# Patient Record
Sex: FEMALE | HISPANIC OR LATINO | Employment: OTHER | URBAN - METROPOLITAN AREA
[De-identification: names, ages, dates, MRNs, and addresses within clinical notes are randomized per-mention and may not be internally consistent; named-entity substitution may affect disease eponyms.]

---

## 2017-01-05 ENCOUNTER — HOSPITAL ENCOUNTER (OUTPATIENT)
Dept: LAB | Facility: MEDICAL CENTER | Age: 72
DRG: 025 | End: 2017-01-05
Attending: INTERNAL MEDICINE
Payer: MEDICARE

## 2017-01-05 LAB
BASOPHILS # BLD AUTO: 0.03 K/UL (ref 0–0.12)
BASOPHILS NFR BLD AUTO: 0.5 % (ref 0–1.8)
EOSINOPHIL # BLD: 0.06 K/UL (ref 0–0.51)
EOSINOPHIL NFR BLD AUTO: 0.9 % (ref 0–6.9)
ERYTHROCYTE [DISTWIDTH] IN BLOOD BY AUTOMATED COUNT: 50.1 FL (ref 35.9–50)
HCT VFR BLD AUTO: 41.3 % (ref 37–47)
HGB BLD-MCNC: 13.2 G/DL (ref 12–16)
IMM GRANULOCYTES # BLD AUTO: 0.03 K/UL (ref 0–0.11)
IMM GRANULOCYTES NFR BLD AUTO: 0.5 % (ref 0–0.9)
LYMPHOCYTES # BLD: 1.37 K/UL (ref 1–4.8)
LYMPHOCYTES NFR BLD AUTO: 21 % (ref 22–41)
MCH RBC QN AUTO: 30.3 PG (ref 27–33)
MCHC RBC AUTO-ENTMCNC: 32 G/DL (ref 33.6–35)
MCV RBC AUTO: 94.9 FL (ref 81.4–97.8)
MONOCYTES # BLD: 0.39 K/UL (ref 0–0.85)
MONOCYTES NFR BLD AUTO: 6 % (ref 0–13.4)
NEUTROPHILS # BLD: 4.63 K/UL (ref 2–7.15)
NEUTROPHILS NFR BLD AUTO: 71.1 % (ref 44–72)
NRBC # BLD AUTO: 0 K/UL
NRBC BLD-RTO: 0 /100 WBC
PLATELET # BLD AUTO: 235 K/UL (ref 164–446)
PMV BLD AUTO: 10.7 FL (ref 9–12.9)
RBC # BLD AUTO: 4.35 M/UL (ref 4.2–5.4)
WBC # BLD AUTO: 6.5 K/UL (ref 4.8–10.8)

## 2017-01-06 ENCOUNTER — APPOINTMENT (OUTPATIENT)
Dept: RADIOLOGY | Facility: MEDICAL CENTER | Age: 72
End: 2017-01-06
Attending: EMERGENCY MEDICINE
Payer: MEDICARE

## 2017-01-06 ENCOUNTER — HOSPITAL ENCOUNTER (EMERGENCY)
Facility: MEDICAL CENTER | Age: 72
End: 2017-01-07
Attending: EMERGENCY MEDICINE
Payer: MEDICARE

## 2017-01-06 LAB
ALBUMIN SERPL BCP-MCNC: 3.6 G/DL (ref 3.2–4.9)
ALBUMIN/GLOB SERPL: 1.6 G/DL
ALP SERPL-CCNC: 56 U/L (ref 30–99)
ALT SERPL-CCNC: 13 U/L (ref 2–50)
ANION GAP SERPL CALC-SCNC: 14 MMOL/L (ref 0–11.9)
APTT PPP: 26.6 SEC (ref 24.7–36)
AST SERPL-CCNC: 10 U/L (ref 12–45)
BASOPHILS # BLD AUTO: 0.2 % (ref 0–1.8)
BASOPHILS # BLD: 0.02 K/UL (ref 0–0.12)
BILIRUB SERPL-MCNC: 1 MG/DL (ref 0.1–1.5)
BNP SERPL-MCNC: 446 PG/ML (ref 0–100)
BUN SERPL-MCNC: 42 MG/DL (ref 8–22)
CALCIUM SERPL-MCNC: 8.8 MG/DL (ref 8.4–10.2)
CHLORIDE SERPL-SCNC: 113 MMOL/L (ref 96–112)
CO2 SERPL-SCNC: 15 MMOL/L (ref 20–33)
CREAT SERPL-MCNC: 4.16 MG/DL (ref 0.5–1.4)
EOSINOPHIL # BLD AUTO: 0 K/UL (ref 0–0.51)
EOSINOPHIL NFR BLD: 0 % (ref 0–6.9)
ERYTHROCYTE [DISTWIDTH] IN BLOOD BY AUTOMATED COUNT: 50.3 FL (ref 35.9–50)
GFR SERPL CREATININE-BSD FRML MDRD: 10 ML/MIN/1.73 M 2
GLOBULIN SER CALC-MCNC: 2.2 G/DL (ref 1.9–3.5)
GLUCOSE SERPL-MCNC: 177 MG/DL (ref 65–99)
HCT VFR BLD AUTO: 38.5 % (ref 37–47)
HGB BLD-MCNC: 12.7 G/DL (ref 12–16)
IMM GRANULOCYTES # BLD AUTO: 0.04 K/UL (ref 0–0.11)
IMM GRANULOCYTES NFR BLD AUTO: 0.4 % (ref 0–0.9)
INR PPP: 1.15 (ref 0.87–1.13)
LIPASE SERPL-CCNC: 23 U/L (ref 7–58)
LYMPHOCYTES # BLD AUTO: 0.96 K/UL (ref 1–4.8)
LYMPHOCYTES NFR BLD: 10.4 % (ref 22–41)
MCH RBC QN AUTO: 31.3 PG (ref 27–33)
MCHC RBC AUTO-ENTMCNC: 33 G/DL (ref 33.6–35)
MCV RBC AUTO: 94.8 FL (ref 81.4–97.8)
MONOCYTES # BLD AUTO: 0.49 K/UL (ref 0–0.85)
MONOCYTES NFR BLD AUTO: 5.3 % (ref 0–13.4)
NEUTROPHILS # BLD AUTO: 7.74 K/UL (ref 2–7.15)
NEUTROPHILS NFR BLD: 83.7 % (ref 44–72)
NRBC # BLD AUTO: 0 K/UL
NRBC BLD AUTO-RTO: 0 /100 WBC
PLATELET # BLD AUTO: 214 K/UL (ref 164–446)
PMV BLD AUTO: 10.4 FL (ref 9–12.9)
POTASSIUM SERPL-SCNC: 4.1 MMOL/L (ref 3.6–5.5)
PROT SERPL-MCNC: 5.8 G/DL (ref 6–8.2)
PROTHROMBIN TIME: 14.5 SEC (ref 12–14.6)
RBC # BLD AUTO: 4.06 M/UL (ref 4.2–5.4)
SODIUM SERPL-SCNC: 142 MMOL/L (ref 135–145)
TROPONIN I SERPL-MCNC: 0.05 NG/ML (ref 0–0.04)
WBC # BLD AUTO: 9.3 K/UL (ref 4.8–10.8)

## 2017-01-06 PROCEDURE — 93005 ELECTROCARDIOGRAM TRACING: CPT | Performed by: EMERGENCY MEDICINE

## 2017-01-06 PROCEDURE — 36415 COLL VENOUS BLD VENIPUNCTURE: CPT

## 2017-01-06 PROCEDURE — 83880 ASSAY OF NATRIURETIC PEPTIDE: CPT

## 2017-01-06 PROCEDURE — 71010 DX-CHEST-PORTABLE (1 VIEW): CPT

## 2017-01-06 PROCEDURE — 80053 COMPREHEN METABOLIC PANEL: CPT

## 2017-01-06 PROCEDURE — 96374 THER/PROPH/DIAG INJ IV PUSH: CPT

## 2017-01-06 PROCEDURE — 96375 TX/PRO/DX INJ NEW DRUG ADDON: CPT

## 2017-01-06 PROCEDURE — 85610 PROTHROMBIN TIME: CPT

## 2017-01-06 PROCEDURE — 700111 HCHG RX REV CODE 636 W/ 250 OVERRIDE (IP): Performed by: EMERGENCY MEDICINE

## 2017-01-06 PROCEDURE — 85025 COMPLETE CBC W/AUTO DIFF WBC: CPT

## 2017-01-06 PROCEDURE — 99285 EMERGENCY DEPT VISIT HI MDM: CPT

## 2017-01-06 PROCEDURE — 84484 ASSAY OF TROPONIN QUANT: CPT

## 2017-01-06 PROCEDURE — 70450 CT HEAD/BRAIN W/O DYE: CPT

## 2017-01-06 PROCEDURE — 85730 THROMBOPLASTIN TIME PARTIAL: CPT

## 2017-01-06 PROCEDURE — 83690 ASSAY OF LIPASE: CPT

## 2017-01-06 RX ORDER — ONDANSETRON 2 MG/ML
4 INJECTION INTRAMUSCULAR; INTRAVENOUS ONCE
Status: COMPLETED | OUTPATIENT
Start: 2017-01-06 | End: 2017-01-06

## 2017-01-06 RX ORDER — DEXAMETHASONE SODIUM PHOSPHATE 4 MG/ML
4 INJECTION, SOLUTION INTRA-ARTICULAR; INTRALESIONAL; INTRAMUSCULAR; INTRAVENOUS; SOFT TISSUE ONCE
Status: COMPLETED | OUTPATIENT
Start: 2017-01-06 | End: 2017-01-06

## 2017-01-06 RX ADMIN — ONDANSETRON HYDROCHLORIDE 4 MG: 2 INJECTION, SOLUTION INTRAMUSCULAR; INTRAVENOUS at 22:07

## 2017-01-06 RX ADMIN — DEXAMETHASONE SODIUM PHOSPHATE 4 MG: 4 INJECTION, SOLUTION INTRAMUSCULAR; INTRAVENOUS at 23:21

## 2017-01-07 ENCOUNTER — APPOINTMENT (OUTPATIENT)
Dept: RADIOLOGY | Facility: MEDICAL CENTER | Age: 72
DRG: 025 | End: 2017-01-07
Attending: NEUROLOGICAL SURGERY
Payer: MEDICARE

## 2017-01-07 ENCOUNTER — RESOLUTE PROFESSIONAL BILLING HOSPITAL PROF FEE (OUTPATIENT)
Dept: HOSPITALIST | Facility: MEDICAL CENTER | Age: 72
End: 2017-01-07
Payer: MEDICARE

## 2017-01-07 ENCOUNTER — HOSPITAL ENCOUNTER (INPATIENT)
Facility: MEDICAL CENTER | Age: 72
LOS: 20 days | DRG: 025 | End: 2017-01-27
Attending: EMERGENCY MEDICINE | Admitting: HOSPITALIST
Payer: MEDICARE

## 2017-01-07 VITALS
BODY MASS INDEX: 29.44 KG/M2 | HEIGHT: 62 IN | WEIGHT: 160 LBS | OXYGEN SATURATION: 94 % | DIASTOLIC BLOOD PRESSURE: 80 MMHG | HEART RATE: 62 BPM | TEMPERATURE: 98.1 F | RESPIRATION RATE: 18 BRPM | SYSTOLIC BLOOD PRESSURE: 198 MMHG

## 2017-01-07 DIAGNOSIS — M81.0 OSTEOPOROSIS, UNSPECIFIED: ICD-10-CM

## 2017-01-07 DIAGNOSIS — G93.89 BRAIN MASS: ICD-10-CM

## 2017-01-07 PROBLEM — R90.0 INTRACRANIAL MASS: Status: ACTIVE | Noted: 2017-01-07

## 2017-01-07 LAB
ANION GAP SERPL CALC-SCNC: 12 MMOL/L (ref 0–11.9)
BASOPHILS # BLD AUTO: 0.1 % (ref 0–1.8)
BASOPHILS # BLD: 0.01 K/UL (ref 0–0.12)
BUN SERPL-MCNC: 49 MG/DL (ref 8–22)
CALCIUM SERPL-MCNC: 8.9 MG/DL (ref 8.5–10.5)
CHLORIDE SERPL-SCNC: 108 MMOL/L (ref 96–112)
CO2 SERPL-SCNC: 18 MMOL/L (ref 20–33)
CREAT SERPL-MCNC: 4.19 MG/DL (ref 0.5–1.4)
EKG IMPRESSION: NORMAL
EOSINOPHIL # BLD AUTO: 0 K/UL (ref 0–0.51)
EOSINOPHIL NFR BLD: 0 % (ref 0–6.9)
ERYTHROCYTE [DISTWIDTH] IN BLOOD BY AUTOMATED COUNT: 50.2 FL (ref 35.9–50)
GFR SERPL CREATININE-BSD FRML MDRD: 10 ML/MIN/1.73 M 2
GLUCOSE BLD-MCNC: 178 MG/DL (ref 65–99)
GLUCOSE BLD-MCNC: 190 MG/DL (ref 65–99)
GLUCOSE BLD-MCNC: 191 MG/DL (ref 65–99)
GLUCOSE BLD-MCNC: 196 MG/DL (ref 65–99)
GLUCOSE SERPL-MCNC: 225 MG/DL (ref 65–99)
HCT VFR BLD AUTO: 38.4 % (ref 37–47)
HGB BLD-MCNC: 12.4 G/DL (ref 12–16)
IMM GRANULOCYTES # BLD AUTO: 0.09 K/UL (ref 0–0.11)
IMM GRANULOCYTES NFR BLD AUTO: 1.2 % (ref 0–0.9)
LYMPHOCYTES # BLD AUTO: 0.63 K/UL (ref 1–4.8)
LYMPHOCYTES NFR BLD: 8.1 % (ref 22–41)
MCH RBC QN AUTO: 30.5 PG (ref 27–33)
MCHC RBC AUTO-ENTMCNC: 32.3 G/DL (ref 33.6–35)
MCV RBC AUTO: 94.3 FL (ref 81.4–97.8)
MONOCYTES # BLD AUTO: 0.12 K/UL (ref 0–0.85)
MONOCYTES NFR BLD AUTO: 1.5 % (ref 0–13.4)
NEUTROPHILS # BLD AUTO: 6.95 K/UL (ref 2–7.15)
NEUTROPHILS NFR BLD: 89.1 % (ref 44–72)
NRBC # BLD AUTO: 0 K/UL
NRBC BLD AUTO-RTO: 0 /100 WBC
PLATELET # BLD AUTO: 198 K/UL (ref 164–446)
PMV BLD AUTO: 10 FL (ref 9–12.9)
POTASSIUM SERPL-SCNC: 4.2 MMOL/L (ref 3.6–5.5)
RBC # BLD AUTO: 4.07 M/UL (ref 4.2–5.4)
SODIUM SERPL-SCNC: 138 MMOL/L (ref 135–145)
TROPONIN I SERPL-MCNC: 0.07 NG/ML (ref 0–0.04)
WBC # BLD AUTO: 7.8 K/UL (ref 4.8–10.8)

## 2017-01-07 PROCEDURE — 700102 HCHG RX REV CODE 250 W/ 637 OVERRIDE(OP): Performed by: NEUROLOGICAL SURGERY

## 2017-01-07 PROCEDURE — A9270 NON-COVERED ITEM OR SERVICE: HCPCS | Performed by: NEUROLOGICAL SURGERY

## 2017-01-07 PROCEDURE — 82962 GLUCOSE BLOOD TEST: CPT | Mod: 91

## 2017-01-07 PROCEDURE — 700111 HCHG RX REV CODE 636 W/ 250 OVERRIDE (IP): Performed by: INTERNAL MEDICINE

## 2017-01-07 PROCEDURE — A9270 NON-COVERED ITEM OR SERVICE: HCPCS | Performed by: HOSPITALIST

## 2017-01-07 PROCEDURE — 700105 HCHG RX REV CODE 258: Performed by: HOSPITALIST

## 2017-01-07 PROCEDURE — A9270 NON-COVERED ITEM OR SERVICE: HCPCS

## 2017-01-07 PROCEDURE — 700102 HCHG RX REV CODE 250 W/ 637 OVERRIDE(OP)

## 2017-01-07 PROCEDURE — 80048 BASIC METABOLIC PNL TOTAL CA: CPT

## 2017-01-07 PROCEDURE — 99223 1ST HOSP IP/OBS HIGH 75: CPT | Performed by: HOSPITALIST

## 2017-01-07 PROCEDURE — 700112 HCHG RX REV CODE 229: Performed by: HOSPITALIST

## 2017-01-07 PROCEDURE — 84484 ASSAY OF TROPONIN QUANT: CPT

## 2017-01-07 PROCEDURE — 99285 EMERGENCY DEPT VISIT HI MDM: CPT

## 2017-01-07 PROCEDURE — 700102 HCHG RX REV CODE 250 W/ 637 OVERRIDE(OP): Performed by: HOSPITALIST

## 2017-01-07 PROCEDURE — 770001 HCHG ROOM/CARE - MED/SURG/GYN PRIV*

## 2017-01-07 PROCEDURE — 85025 COMPLETE CBC W/AUTO DIFF WBC: CPT

## 2017-01-07 PROCEDURE — 700111 HCHG RX REV CODE 636 W/ 250 OVERRIDE (IP): Performed by: HOSPITALIST

## 2017-01-07 PROCEDURE — 36415 COLL VENOUS BLD VENIPUNCTURE: CPT

## 2017-01-07 PROCEDURE — 70551 MRI BRAIN STEM W/O DYE: CPT

## 2017-01-07 PROCEDURE — 82962 GLUCOSE BLOOD TEST: CPT

## 2017-01-07 RX ORDER — MORPHINE SULFATE 4 MG/ML
2 INJECTION, SOLUTION INTRAMUSCULAR; INTRAVENOUS EVERY 4 HOURS PRN
Status: DISCONTINUED | OUTPATIENT
Start: 2017-01-07 | End: 2017-01-07

## 2017-01-07 RX ORDER — SODIUM CHLORIDE 9 MG/ML
2000 INJECTION, SOLUTION INTRAVENOUS ONCE
Status: COMPLETED | OUTPATIENT
Start: 2017-01-07 | End: 2017-01-07

## 2017-01-07 RX ORDER — DOCUSATE SODIUM 100 MG/1
100 CAPSULE, LIQUID FILLED ORAL EVERY MORNING
Status: DISCONTINUED | OUTPATIENT
Start: 2017-01-07 | End: 2017-01-27 | Stop reason: HOSPADM

## 2017-01-07 RX ORDER — ENEMA 19; 7 G/133ML; G/133ML
1 ENEMA RECTAL
Status: DISCONTINUED | OUTPATIENT
Start: 2017-01-07 | End: 2017-01-07

## 2017-01-07 RX ORDER — LACTULOSE 20 G/30ML
30 SOLUTION ORAL
Status: DISCONTINUED | OUTPATIENT
Start: 2017-01-07 | End: 2017-01-27 | Stop reason: HOSPADM

## 2017-01-07 RX ORDER — AMOXICILLIN 250 MG
1 CAPSULE ORAL
Status: DISCONTINUED | OUTPATIENT
Start: 2017-01-07 | End: 2017-01-27 | Stop reason: HOSPADM

## 2017-01-07 RX ORDER — LEVETIRACETAM 500 MG/1
500 TABLET ORAL 2 TIMES DAILY
Status: DISCONTINUED | OUTPATIENT
Start: 2017-01-07 | End: 2017-01-15

## 2017-01-07 RX ORDER — DILTIAZEM HYDROCHLORIDE 240 MG/1
240 CAPSULE, COATED, EXTENDED RELEASE ORAL DAILY
Status: DISCONTINUED | OUTPATIENT
Start: 2017-01-07 | End: 2017-01-14

## 2017-01-07 RX ORDER — SIMVASTATIN 20 MG
10 TABLET ORAL EVERY EVENING
Status: DISCONTINUED | OUTPATIENT
Start: 2017-01-07 | End: 2017-01-27 | Stop reason: HOSPADM

## 2017-01-07 RX ORDER — ACETAMINOPHEN 325 MG/1
650 TABLET ORAL EVERY 6 HOURS PRN
Status: DISCONTINUED | OUTPATIENT
Start: 2017-01-07 | End: 2017-01-20

## 2017-01-07 RX ORDER — ONDANSETRON 4 MG/1
4 TABLET, ORALLY DISINTEGRATING ORAL EVERY 4 HOURS PRN
Status: DISCONTINUED | OUTPATIENT
Start: 2017-01-07 | End: 2017-01-27 | Stop reason: HOSPADM

## 2017-01-07 RX ORDER — LABETALOL HYDROCHLORIDE 5 MG/ML
10 INJECTION, SOLUTION INTRAVENOUS EVERY 4 HOURS PRN
Status: DISCONTINUED | OUTPATIENT
Start: 2017-01-07 | End: 2017-01-15

## 2017-01-07 RX ORDER — LORAZEPAM 2 MG/ML
0.5 INJECTION INTRAMUSCULAR EVERY 6 HOURS PRN
Status: DISCONTINUED | OUTPATIENT
Start: 2017-01-07 | End: 2017-01-12

## 2017-01-07 RX ORDER — ONDANSETRON 2 MG/ML
4 INJECTION INTRAMUSCULAR; INTRAVENOUS EVERY 4 HOURS PRN
Status: DISCONTINUED | OUTPATIENT
Start: 2017-01-07 | End: 2017-01-27 | Stop reason: HOSPADM

## 2017-01-07 RX ORDER — ASPIRIN 81 MG/1
81 TABLET, CHEWABLE ORAL DAILY
Status: ON HOLD | COMMUNITY
End: 2017-01-26

## 2017-01-07 RX ORDER — PROMETHAZINE HYDROCHLORIDE 25 MG/1
25 TABLET ORAL EVERY 6 HOURS PRN
Status: DISCONTINUED | OUTPATIENT
Start: 2017-01-07 | End: 2017-01-27 | Stop reason: HOSPADM

## 2017-01-07 RX ORDER — AMOXICILLIN 250 MG
1 CAPSULE ORAL NIGHTLY
Status: DISCONTINUED | OUTPATIENT
Start: 2017-01-07 | End: 2017-01-27 | Stop reason: HOSPADM

## 2017-01-07 RX ORDER — METOPROLOL TARTRATE 50 MG/1
50 TABLET, FILM COATED ORAL 2 TIMES DAILY
Status: ON HOLD | COMMUNITY
End: 2017-01-26

## 2017-01-07 RX ORDER — HYDRALAZINE HYDROCHLORIDE 20 MG/ML
20 INJECTION INTRAMUSCULAR; INTRAVENOUS EVERY 6 HOURS PRN
Status: DISCONTINUED | OUTPATIENT
Start: 2017-01-07 | End: 2017-01-15

## 2017-01-07 RX ORDER — METOPROLOL SUCCINATE 50 MG/1
50 TABLET, EXTENDED RELEASE ORAL 3 TIMES DAILY
Status: DISCONTINUED | OUTPATIENT
Start: 2017-01-07 | End: 2017-01-09

## 2017-01-07 RX ORDER — BISACODYL 10 MG
10 SUPPOSITORY, RECTAL RECTAL
Status: DISCONTINUED | OUTPATIENT
Start: 2017-01-07 | End: 2017-01-27 | Stop reason: HOSPADM

## 2017-01-07 RX ORDER — HYDROMORPHONE HYDROCHLORIDE 2 MG/1
2 TABLET ORAL
Status: DISCONTINUED | OUTPATIENT
Start: 2017-01-07 | End: 2017-01-07

## 2017-01-07 RX ORDER — SIMVASTATIN 20 MG
20 TABLET ORAL EVERY EVENING
Status: DISCONTINUED | OUTPATIENT
Start: 2017-01-07 | End: 2017-01-07

## 2017-01-07 RX ORDER — MORPHINE SULFATE 4 MG/ML
2-4 INJECTION, SOLUTION INTRAMUSCULAR; INTRAVENOUS EVERY 4 HOURS PRN
Status: DISCONTINUED | OUTPATIENT
Start: 2017-01-07 | End: 2017-01-27 | Stop reason: HOSPADM

## 2017-01-07 RX ORDER — LABETALOL HYDROCHLORIDE 5 MG/ML
10 INJECTION, SOLUTION INTRAVENOUS EVERY 4 HOURS PRN
Status: DISCONTINUED | OUTPATIENT
Start: 2017-01-07 | End: 2017-01-07

## 2017-01-07 RX ORDER — PIOGLITAZONEHYDROCHLORIDE 30 MG/1
30 TABLET ORAL DAILY
Status: DISCONTINUED | OUTPATIENT
Start: 2017-01-07 | End: 2017-01-09

## 2017-01-07 RX ORDER — LORAZEPAM 1 MG/1
0.5 TABLET ORAL EVERY 6 HOURS PRN
Status: DISCONTINUED | OUTPATIENT
Start: 2017-01-07 | End: 2017-01-12

## 2017-01-07 RX ORDER — DEXTROSE MONOHYDRATE 25 G/50ML
25 INJECTION, SOLUTION INTRAVENOUS
Status: DISCONTINUED | OUTPATIENT
Start: 2017-01-07 | End: 2017-01-27 | Stop reason: HOSPADM

## 2017-01-07 RX ORDER — GLYBURIDE 5 MG/1
10 TABLET ORAL 2 TIMES DAILY WITH MEALS
Status: DISCONTINUED | OUTPATIENT
Start: 2017-01-07 | End: 2017-01-07

## 2017-01-07 RX ORDER — DEXAMETHASONE SODIUM PHOSPHATE 4 MG/ML
4 INJECTION, SOLUTION INTRA-ARTICULAR; INTRALESIONAL; INTRAMUSCULAR; INTRAVENOUS; SOFT TISSUE EVERY 6 HOURS
Status: DISCONTINUED | OUTPATIENT
Start: 2017-01-07 | End: 2017-01-20

## 2017-01-07 RX ADMIN — LEVETIRACETAM 500 MG: 500 TABLET, FILM COATED ORAL at 21:14

## 2017-01-07 RX ADMIN — HYDRALAZINE HYDROCHLORIDE 20 MG: 20 INJECTION INTRAMUSCULAR; INTRAVENOUS at 02:41

## 2017-01-07 RX ADMIN — ONDANSETRON 4 MG: 2 INJECTION, SOLUTION INTRAMUSCULAR; INTRAVENOUS at 02:41

## 2017-01-07 RX ADMIN — SODIUM CHLORIDE 2000 ML: 9 INJECTION, SOLUTION INTRAVENOUS at 02:40

## 2017-01-07 RX ADMIN — ACETAMINOPHEN 650 MG: 325 TABLET, FILM COATED ORAL at 21:13

## 2017-01-07 RX ADMIN — INSULIN LISPRO 2 UNITS: 100 INJECTION, SOLUTION INTRAVENOUS; SUBCUTANEOUS at 06:06

## 2017-01-07 RX ADMIN — DEXAMETHASONE SODIUM PHOSPHATE 4 MG: 4 INJECTION, SOLUTION INTRAMUSCULAR; INTRAVENOUS at 23:57

## 2017-01-07 RX ADMIN — LEVETIRACETAM 500 MG: 500 TABLET, FILM COATED ORAL at 13:02

## 2017-01-07 RX ADMIN — ONDANSETRON 4 MG: 2 INJECTION, SOLUTION INTRAMUSCULAR; INTRAVENOUS at 15:24

## 2017-01-07 RX ADMIN — Medication 1 TABLET: at 21:13

## 2017-01-07 RX ADMIN — PIOGLITAZONE 30 MG: 30 TABLET ORAL at 09:20

## 2017-01-07 RX ADMIN — DEXAMETHASONE SODIUM PHOSPHATE 4 MG: 4 INJECTION, SOLUTION INTRAMUSCULAR; INTRAVENOUS at 13:02

## 2017-01-07 RX ADMIN — ACETAMINOPHEN 650 MG: 325 TABLET, FILM COATED ORAL at 02:40

## 2017-01-07 RX ADMIN — MORPHINE SULFATE 2 MG: 4 INJECTION INTRAVENOUS at 15:42

## 2017-01-07 RX ADMIN — DEXAMETHASONE SODIUM PHOSPHATE 4 MG: 4 INJECTION, SOLUTION INTRAMUSCULAR; INTRAVENOUS at 17:43

## 2017-01-07 RX ADMIN — ACETAMINOPHEN 650 MG: 325 TABLET, FILM COATED ORAL at 15:12

## 2017-01-07 RX ADMIN — INSULIN LISPRO 2 UNITS: 100 INJECTION, SOLUTION INTRAVENOUS; SUBCUTANEOUS at 17:57

## 2017-01-07 RX ADMIN — METOPROLOL SUCCINATE 50 MG: 50 TABLET, EXTENDED RELEASE ORAL at 21:14

## 2017-01-07 RX ADMIN — METOPROLOL SUCCINATE 50 MG: 50 TABLET, EXTENDED RELEASE ORAL at 09:20

## 2017-01-07 RX ADMIN — ASPIRIN 81 MG: 81 TABLET ORAL at 09:20

## 2017-01-07 RX ADMIN — DEXAMETHASONE SODIUM PHOSPHATE 4 MG: 4 INJECTION, SOLUTION INTRAMUSCULAR; INTRAVENOUS at 06:04

## 2017-01-07 RX ADMIN — DEXAMETHASONE SODIUM PHOSPHATE 4 MG: 4 INJECTION, SOLUTION INTRAMUSCULAR; INTRAVENOUS at 02:41

## 2017-01-07 RX ADMIN — INSULIN LISPRO 2 UNITS: 100 INJECTION, SOLUTION INTRAVENOUS; SUBCUTANEOUS at 21:21

## 2017-01-07 RX ADMIN — SIMVASTATIN 10 MG: 20 TABLET, FILM COATED ORAL at 21:14

## 2017-01-07 RX ADMIN — DOCUSATE SODIUM 100 MG: 100 CAPSULE ORAL at 09:20

## 2017-01-07 RX ADMIN — METOPROLOL SUCCINATE 50 MG: 50 TABLET, EXTENDED RELEASE ORAL at 17:43

## 2017-01-07 RX ADMIN — DILTIAZEM HYDROCHLORIDE 240 MG: 240 CAPSULE, COATED, EXTENDED RELEASE ORAL at 09:20

## 2017-01-07 ASSESSMENT — PAIN SCALES - GENERAL
PAINLEVEL_OUTOF10: 0
PAINLEVEL_OUTOF10: 10
PAINLEVEL_OUTOF10: 2

## 2017-01-07 ASSESSMENT — LIFESTYLE VARIABLES
DO YOU DRINK ALCOHOL: NO
ALCOHOL_USE: NO
EVER_SMOKED: NEVER
EVER_SMOKED: NEVER

## 2017-01-07 ASSESSMENT — PATIENT HEALTH QUESTIONNAIRE - PHQ9
2. FEELING DOWN, DEPRESSED, IRRITABLE, OR HOPELESS: NOT AT ALL
SUM OF ALL RESPONSES TO PHQ9 QUESTIONS 1 AND 2: 0
1. LITTLE INTEREST OR PLEASURE IN DOING THINGS: NOT AT ALL
SUM OF ALL RESPONSES TO PHQ QUESTIONS 1-9: 0

## 2017-01-07 ASSESSMENT — COPD QUESTIONNAIRES
DO YOU EVER COUGH UP ANY MUCUS OR PHLEGM?: YES, A FEW DAYS A WEEK OR MONTH
COPD SCREENING SCORE: 3
HAVE YOU SMOKED AT LEAST 100 CIGARETTES IN YOUR ENTIRE LIFE: NO/DON'T KNOW
COPD SCREENING SCORE: 3
DO YOU EVER COUGH UP ANY MUCUS OR PHLEGM?: YES, A FEW DAYS A WEEK OR MONTH
HAVE YOU SMOKED AT LEAST 100 CIGARETTES IN YOUR ENTIRE LIFE: NO/DON'T KNOW
DURING THE PAST 4 WEEKS HOW MUCH DID YOU FEEL SHORT OF BREATH: NONE/LITTLE OF THE TIME
DURING THE PAST 4 WEEKS HOW MUCH DID YOU FEEL SHORT OF BREATH: NONE/LITTLE OF THE TIME

## 2017-01-07 NOTE — IP AVS SNAPSHOT
1/27/2017          Yina Stephens  [address] [facility] [place] [address]    Dear Yina:    [facility] wants to ensure your discharge home is safe and you or your loved ones have had all your questions answered regarding your care after you leave the hospital.    You may receive a telephone call within two days of your discharge.  This call is to make certain you understand your discharge instructions as well as ensure we provided you with the best care possible during your stay with us.     The call will only last approximately 3-5 minutes and will be done by a nurse.    Once again, we want to ensure your discharge home is safe and that you have a clear understanding of any next steps in your care.  If you have any questions or concerns, please do not hesitate to contact us, we are here for you.  Thank you for choosing [facility] for your healthcare needs.    Sincerely,    Sanju Mix    [facility]

## 2017-01-07 NOTE — PROGRESS NOTES
Pt arrived to unit, pivoted from [facility] to [facility] bed. A&Ox4. PERRLA. Pt passed dysphasia screening, no complaints of pain, mild h/a, slightly nauseas. RN gave tylenol and Zofran. Respirations are even and unlabored with clear/dimished breath sounds on 2 L. /115, RN gave Hydralazine. Abdomen is soft, nontender with hypoactive bowel sounds. Skin is warm and dry with no breakdown and no edema noted. . SCDs on, bed alarm on, call light within reach, will continue to monitor.

## 2017-01-07 NOTE — ED NOTES
Iv placed , labs drawn and taken to lab.Pt medicated as directed by JUAN hernandez, poc update given to pt. Further orders and dispo pending at this time. No further questions from pt at this time

## 2017-01-07 NOTE — ED PROVIDER NOTES
ED Provider Note    CHIEF COMPLAINT  Chief Complaint   Patient presents with   • Weakness     Pt BIB [facility] with c/o general weakness for past week   • Nausea     With dry heaves, denies vomiting       HPI  Yina Stephens is a 71 y.o. female who presents for evaluation of generalized weakness slightly greater on the left upper and lower extremity and the right nausea and occasional episodes of confusion. The patient has extensive past medical history including hypertension dyslipidemia chronic renal insufficiency. She only has one kidney. She reports that about 2 months ago she has some subtle weakness on the left side thought it was a TIA but never sought medical care. She has no known history of cancer. Denies high fevers chills or chest pain. She is noted to be profoundly hypertensive on arrival denies headache    REVIEW OF SYSTEMS  See HPI for further details positive for generalized weakness and clumsiness in the left upper extremity. All other systems are negative.     PAST MEDICAL HISTORY  Past Medical History   Diagnosis Date   • Hypertension    • Dyslipidemia    • Hiatus hernia syndrome    • Pain    • Diabetes      oral meds   • Renal disorder      one kidney at 35%   • CATARACT      zuly iols       FAMILY HISTORY  No history of bleeding disorder     SOCIAL HISTORY  Social History     Social History   • Marital Status: Single     Spouse Name: N/A   • Number of Children: N/A   • Years of Education: N/A     Social History Main Topics   • Smoking status: Never Smoker    • Smokeless tobacco: None   • Alcohol Use: No   • Drug Use: No   • Sexual Activity: Not Asked      Comment: single, no kids, retired     Other Topics Concern   • None     Social History Narrative    nonsmoker no drugs or alcohol single    SURGICAL HISTORY  Past Surgical History   Procedure Laterality Date   • Nephrectomy laparoscopic  1980   • Pr appendectomy     • Simran by laparoscopy     • Hemorrhoidectomy  7/31/2013     Performed by Fransisco YU  "MAKI Ibarra at SURGERY [facility] ORS       CURRENT MEDICATIONS  Home Medications     Reviewed by Dee Gallo R.N. (Registered Nurse) on 01/06/17 at 2139  Med List Status: Complete    Medication Last Dose Status    alendronate (FOSAMAX) 35 MG tablet 12/25/2016 Active    BABY ASPIRIN PO 1/5/2017 Active    calcitRIOL (ROCALTROL) 0.25 MCG CAPS 1/5/2017 Active    diltiazem CD (CARDIZEM CD) 240 MG CP24 1/5/2017 Active    furosemide (LASIX) 20 MG TABS prn Active    glyBURIDE (DIABETA) 5 MG TABS 1/4/2017 Active    metoprolol SR (TOPROL XL) 50 MG TB24 1/4/2017 Active    pioglitazone (ACTOS) 30 MG TABS 1/4/2017 Active    simvastatin (ZOCOR) 20 MG TABS 1/4/2017 Active    terazosin (HYTRIN) 2 MG CAPS 1/4/2017 Active    vitamin D, Ergocalciferol, (DRISDOL) 11238 UNITS CAPS capsule once a month Active                ALLERGIES  Allergies   Allergen Reactions   • Codeine Vomiting   • Dilaudid [Hydromorphone Hcl] Vomiting   • Percocet [Oxycodone-Acetaminophen] Vomiting   • Percodan [Oxycodone-Aspirin] Vomiting   • Ultram [Tramadol] Vomiting   • Vicodin [Hydrocodone-Acetaminophen] Vomiting       PHYSICAL EXAM  VITAL SIGNS: /79 mmHg  Pulse 71  Temp(Src) 36.7 °C (98.1 °F)  Resp 16  Ht 1.575 m (5' 2.01\")  Wt 72.576 kg (160 lb)  BMI 29.26 kg/m2  SpO2 93% Room air O2: 93    Constitutional: Patient appears generally ill  HENT: Normocephalic, Atraumatic, Bilateral external ears normal, Oropharynx moist, No oral exudates, Nose normal.   Eyes: PERRLA, EOMI, Conjunctiva normal, No discharge.   Neck: Normal range of motion, No tenderness, Supple, No stridor.   Cardiovascular: Normal heart rate, Normal rhythm, No murmurs, No rubs, No gallops.   Thorax & Lungs: Normal breath sounds, No respiratory distress, No wheezing, No chest tenderness.   Abdomen: Bowel sounds normal, Soft, No tenderness, No masses, No pulsatile masses.   Skin: Warm, Dry, No erythema, No rash.   Back: No tenderness, No CVA tenderness.   Extremities: " Intact distal pulses, No edema, No tenderness, No cyanosis, No clubbing.   Musculoskeletal: Good range of motion in all major joints. No tenderness to palpation or major deformities noted.   Neurologic: Alert & oriented x 3, subtle clumsiness noted in the left upper extremity no pronator drift no facial droop   Psychiatric: Affect normal, Judgment normal, Mood normal.     EKG  Sinus rhythm interpretation by me no acute ST segment elevation or depression or pathological T-wave inversions no ectopy intervals normal    RADIOLOGY/PROCEDURES  DX-CHEST-PORTABLE (1 VIEW)   Final Result      No acute cardiopulmonary abnormality.      CT-HEAD W/O    (Results Pending)         COURSE & MEDICAL DECISION MAKING  Pertinent Labs & Imaging studies reviewed. (See chart for details)  Results for orders placed or performed during the hospital encounter of 01/06/17   CBC WITH DIFFERENTIAL   Result Value Ref Range    WBC 9.3 4.8 - 10.8 K/uL    RBC 4.06 (L) 4.20 - 5.40 M/uL    Hemoglobin 12.7 12.0 - 16.0 g/dL    Hematocrit 38.5 37.0 - 47.0 %    MCV 94.8 81.4 - 97.8 fL    MCH 31.3 27.0 - 33.0 pg    MCHC 33.0 (L) 33.6 - 35.0 g/dL    RDW 50.3 (H) 35.9 - 50.0 fL    Platelet Count 214 164 - 446 K/uL    MPV 10.4 9.0 - 12.9 fL    Neutrophils-Polys 83.70 (H) 44.00 - 72.00 %    Lymphocytes 10.40 (L) 22.00 - 41.00 %    Monocytes 5.30 0.00 - 13.40 %    Eosinophils 0.00 0.00 - 6.90 %    Basophils 0.20 0.00 - 1.80 %    Immature Granulocytes 0.40 0.00 - 0.90 %    Nucleated RBC 0.00 /100 WBC    Neutrophils (Absolute) 7.74 (H) 2.00 - 7.15 K/uL    Lymphs (Absolute) 0.96 (L) 1.00 - 4.80 K/uL    Monos (Absolute) 0.49 0.00 - 0.85 K/uL    Eos (Absolute) 0.00 0.00 - 0.51 K/uL    Baso (Absolute) 0.02 0.00 - 0.12 K/uL    Immature Granulocytes (abs) 0.04 0.00 - 0.11 K/uL    NRBC (Absolute) 0.00 K/uL   COMP METABOLIC PANEL   Result Value Ref Range    Sodium 142 135 - 145 mmol/L    Potassium 4.1 3.6 - 5.5 mmol/L    Chloride 113 (H) 96 - 112 mmol/L    Co2 15 (L) 20  - 33 mmol/L    Anion Gap 14.0 (H) 0.0 - 11.9    Glucose 177 (H) 65 - 99 mg/dL    Bun 42 (H) 8 - 22 mg/dL    Creatinine 4.16 (H) 0.50 - 1.40 mg/dL    Calcium 8.8 8.4 - 10.2 mg/dL    AST(SGOT) 10 (L) 12 - 45 U/L    ALT(SGPT) 13 2 - 50 U/L    Alkaline Phosphatase 56 30 - 99 U/L    Total Bilirubin 1.0 0.1 - 1.5 mg/dL    Albumin 3.6 3.2 - 4.9 g/dL    Total Protein 5.8 (L) 6.0 - 8.2 g/dL    Globulin 2.2 1.9 - 3.5 g/dL    A-G Ratio 1.6 g/dL   LIPASE   Result Value Ref Range    Lipase 23 7 - 58 U/L   TROPONIN   Result Value Ref Range    Troponin I 0.05 (H) 0.00 - 0.04 ng/mL   BTYPE NATRIURETIC PEPTIDE   Result Value Ref Range    B Natriuretic Peptide 446 (H) 0 - 100 pg/mL   PROTHROMBIN TIME   Result Value Ref Range    PT 14.5 12.0 - 14.6 sec    INR 1.15 (H) 0.87 - 1.13   APTT   Result Value Ref Range    APTT 26.6 24.7 - 36.0 sec   ESTIMATED GFR   Result Value Ref Range    GFR If  13 (A) >60 mL/min/1.73 m 2    GFR If Non African American 10 (A) >60 mL/min/1.73 m 2      The patient unfortunately here as evidence of likely a new diagnosis of brain mass either primary malignancy or metastases. This would likely account for all of her symptoms. Given the fact that she has some subtle midline shift and vasogenic edema she was given IV Decadron here 4 mg. Due to the fact we do not have neurosurgical and/or oncological capability we have transferred her to [facility] for definitive evaluation. She did not have evidence of eminent herniation or immediate neurological decline but with the relatively large brain mass and swelling with shift this could progress quite rapidly and therefore she was emergently transported     FINAL IMPRESSION  1. New diagnosis of right brain mass with vasogenic edema  2. Chronic renal insufficiency worsening  3. Indeterminate troponin    Transfer    CRITICAL CARE TIME:    The patient required approximately 30 minutes worth of critical care time. This excludes any  procedures. This includes time spent directly at caring for the patient, making critical medical decisions, involving consultants and speaking with the family.      Electronically signed by: Miguelangel Yarbrough, 1/6/2017 9:37 PM

## 2017-01-07 NOTE — CARE PLAN
Problem: Venous Thromboembolism (VTW)/Deep Vein Thrombosis (DVT) Prevention:  Goal: Patient will participate in Venous Thrombosis (VTE)/Deep Vein Thrombosis (DVT)Prevention Measures  Intervention: Ensure patient wears graduated elastic stockings (ADALGISA hose) and/or SCDs, if ordered, when in bed or chair (Remove at least once per shift for skin check)  No s/s of DVT, has sequentials on.      Problem: Knowledge Deficit  Goal: Knowledge of disease process/condition, treatment plan, diagnostic tests, and medications will improve  Intervention: Assess knowledge level of disease process/condition, treatment plan, diagnostic tests, and medications  Reviewing plan of care, activities, and medication with patient.  Encouraging patient to ask questions and participate in plan of care.  Providing answers to all questions.  Continuing with current plan of care.  Hourly rounding in practice.

## 2017-01-07 NOTE — IP AVS SNAPSHOT
[employer] Access Code: [email or web address]  Expires: 2/26/2017 11:05 AM    Your email address is not on file at [email or web address].  Email Addresses are required for you to sign up for [employer], please contact [phone] to verify your personal information and to provide your email address prior to attempting to register for [employer].    Yinanathen Stephens  [address] [place] [address]    [employer]  A secure, online tool to manage your health information     [email or web address]’s [employer]® is a secure, online tool that connects you to your personalized health information from the privacy of your home -- day or night - making it very easy for you to manage your healthcare. Once the activation process is completed, you can even access your medical information using the [employer] bello, which is available for free in the Apple Bello store or Google Play store.     To learn more about [employer], visit [email or web address]    There are two levels of access available (as shown below):   My Chart Features  [facility] Primary Care Doctor [facility]  Specialists [facility]  Urgent  Care Non-[facility] Primary Care Doctor   Email your healthcare team securely and privately 24/7 X X X    Manage appointments: schedule your next appointment; view details of past/upcoming appointments X      Request prescription refills. X      View recent personal medical records, including lab and immunizations X X X X   View health record, including health history, allergies, medications X X X X   Read reports about your outpatient visits, procedures, consult and ER notes X X X X   See your discharge summary, which is a recap of your hospital and/or ER visit that includes your diagnosis, lab results, and care plan X X  X     How to register for [employer]:  Once your e-mail address has been verified, follow the following steps to sign up for [employer].     1. Go to  [email or web address]  2. Click on the Sign Up Now box, which takes you to the New Member Sign Up page. You will  need to provide the following information:  a. Enter your [employer] Access Code exactly as it appears at the top of this page. (You will not need to use this code after you’ve completed the sign-up process. If you do not sign up before the expiration date, you must request a new code.)   b. Enter your date of birth.   c. Enter your home email address.   d. Click Submit, and follow the next screen’s instructions.  3. Create a [employer]OO[employer] ID. This will be your [employer] login ID and cannot be changed, so think of one that is secure and easy to remember.  4. Create a [employer] password. You can change your password at any time.  5. Enter your Password Reset Question and Answer. This can be used at a later time if you forget your password.   6. Enter your e-mail address. This allows you to receive e-mail notifications when new information is available in [employer].  7. Click Sign Up. You can now view your health information.    For assistance activating your [employer] account, call [phone]

## 2017-01-07 NOTE — H&P
CHIEF COMPLAINT:  Headache, nausea, and vomiting.    PRIMARY MEDICAL PHYSICIAN:  Omer Park.    HEMATOLOGIST:  Dr. Salomon.    HISTORY OF PRESENT ILLNESS:  The patient is a 71-year-old female with a   history of hypertension, hyperlipidemia, diabetes mellitus, and chronic kidney   disease who comes in with complaints of headache, nausea, and vomiting.  Her   symptoms were acute in onset and had been ongoing for the last 2-3 days.  She   initially thought that her symptoms were related to a cold.  Her headache is   described as constant, frontal, and somewhat alleviated with Tylenol.  She has   had nausea and multiple episodes of dry heaving.  Her gastrointestinal   symptoms come on suddenly and are intermittent and just suddenly go away.  She   reports a 10-pound weight loss over the last month, which was unintentional.    She also reports night sweats for the last 3 weeks.  She reports global aches   for the last 4 weeks.  She also reports rhinorrhea, which she attributed to   allergies and positive sick contacts.  Of note, the  who is at bedside   reports an episode of slurred speech which was transient that occurred around   Thanksgiving.  She denies any chest pains or shortness of breath.  The patient   was initially seen at the [facility] Room and scan of the   head noted a new intracranial mass.  She received a dose of IV Decadron and   was then transferred to the [facility] hospital for further evaluation and treatment   as well as specialist consultation.    REVIEW OF SYSTEMS:  A full review of systems was completed and all pertinent   positives and negatives are included in the HPI above.    PAST MEDICAL HISTORY:  1.  Hypertension.  2.  Hyperlipidemia.  3.  Diabetes mellitus.  4.  Hiatal hernia.  5.  Chronic kidney disease stage III.    PAST SURGICAL HISTORY:  1.  Nephrectomy secondary to nicked vessel during appendectomy.  2.  Appendectomy.  3.  Cholecystectomy.  4.   Hemorrhoidectomy.    SOCIAL HISTORY:  No tobacco, alcohol, or illicit drugs.  The patient is a   retired LPN.    FAMILY HISTORY:  Brother with multiple myeloma.  Father with hypertension.    ALLERGIES:  CODEINE, DILAUDID, PERCOCET, PERCODAN, ULTRAM, AND VICODIN.    HOME MEDICATIONS:  1.  Baby aspirin.  2.  Calcium.  3.  Vitamin D.  4.  Fosamax 35 mg p.o. q. 7 days.  5.  Zocor 20 mg p.o. daily.  6.  Diabeta 5 mg p.o. b.i.d.  7.  Actos 30 mg p.o. daily.  8.  Diltiazem 240 mg p.o. b.i.d.  9.  Metoprolol 50 mg p.o. t.i.d.  10.  Hytrin 4 mg p.o. daily.    PHYSICAL EXAMINATION:  VITAL SIGNS:  Temperature 97.4, heart rate 57, respirations 16, blood pressure   200/94.  The patient is saturating at 94% on room air.  GENERAL:  This is an older  female who is in no acute distress.  HEENT:  Mild proptosis, otherwise normocephalic, atraumatic, EOMI.  Moist   mucous membranes.  NECK:  Supple, there is no JVD.  There is no supraclavicular adenopathy.  CARDIOVASCULAR:  Positive S1, S2, regular rate and rhythm.  No murmurs, rubs,   or gallops appreciated.  PULMONARY:  Clear to auscultation bilaterally.  No wheezes, rubs, or rhonchi   heard.  GASTROINTESTINAL:  Soft, nontender, nondistended.  Positive bowel sounds.  No   hepatosplenomegaly.  EXTREMITIES:  Warm, well perfused.  No clubbing, cyanosis, or edema.    Capillary refill less than 3 seconds.  Distal pulses are intact.  NEUROLOGIC:  Cranial nerves II-XII grossly intact.    LABORATORY DATA:  WBC 9.3, hemoglobin 12.7, hematocrit 38.5, and platelet 214.    Sodium 142, potassium 4.1, chloride 113, CO2 15, anion gap 14.0, glucose   177, BUN 42, creatinine 4.16, GFR is 10, AST 10, ALT 13.  Troponin 0.05.  BNP   446.  INR 1.15.    IMAGING:  CT of the head without contrast:  Large right temporal lobe mass   with resultant vasogenic edema and mass effect.  Primary brain malignancy will   be likely versus a large solitary metastases.  MRI would be helpful for more   complete  characterization.    ASSESSMENT AND PLAN:  This is a 71-year-old female who presented to the [facility] with headache and nausea and vomiting which was acute   in onset.  A CT head was completed and showed a new intracranial mass.  1.  Intracranial mass:  The patient has vasogenic edema with mass effect.    Concerns of course at this point will be primary brain malignancy versus   metastatic disease.  I will order MRI of the brain for better evaluation.  She   would benefit from a CT of the chest, abdomen, and pelvis for staging;   however, her renal function precludes me from ordering contrast at this time.    Neurosurgery has been consulted by the ER physician and I look forward to   their recommendations.  In the meantime, I am admitting her to the   neurosurgery floor for q. 4 neuro checks.  I have started her on Decadron.    The patient has indicated that if oncology is to be consulted, she would   prefer to remain with Dr. Salomon who is currently following her for vitamin   deficiency.  2.  Hypertensive urgency with elevated troponins:  I will resume the patient's   metoprolol, diltiazem, adjusted for renal function.  She will continue on her   home aspirin.  I am also adding p.r.n. hydralazine and clonidine.  We will   monitor her closely and I will trend her troponin levels to ensure that they   do not continue to elevate.  She is asymptomatic from this standpoint and   denies any chest pain at this time.  3.  Acute on chronic renal failure:  The patient carries a history of chronic   kidney disease stage III at baseline.  She is followed by Dr. Nikolay Pierce.    Her renal function is currently worse than her previous baseline.  I am giving   her gentle IV fluids and holding nephrotoxic medications when possible.  We   will consult Dr. Pierce in the morning.  She will need a contrast study for   staging of possible malignancy.  4.  History of hyperlipidemia.  Continue on Zocor.  5.  History  of diabetes mellitus.  Continue home Actos and cover with insulin   sliding scale and monitor with Accu-Cheks.    DISPOSITION:  Neurosurgery.    PROPHYLAXIS:  Sequential compression devices for DVT prophylaxis, no PPI   indicated at this time, stool softeners ordered.    CODE:  Full.       ____________________________________     MD DARREN TABARES / NTS    DD:  01/07/2017 02:40:56  DT:  01/07/2017 03:12:57    D#:  [id number]  Job#:  [id number]

## 2017-01-07 NOTE — ED NOTES
.Yina Stephens  .  Chief Complaint   Patient presents with   • Headache     Patient CARI KC from [facility] with above complaint and further eval of brain mass with shift. Patient aao x 4. PIV placed pta. ERP to see.     Report given to TIMOTHY Ngo.

## 2017-01-07 NOTE — ED NOTES
All results back, chart up for MD for re evaluation. poc update given to pt. No further questions at this time. Further orders and dispo pending

## 2017-01-07 NOTE — ED NOTES
remsa present for transfer to [facility], all documents with staff.  No change in pt condition at time of trasfer

## 2017-01-07 NOTE — PROGRESS NOTES
Med rec complete per pt with family at bedside  Allergies reviewed  Pt denies ABX in last month  Pt has not taken home medications in past few days

## 2017-01-07 NOTE — CONSULTS
DATE OF SERVICE:  01/07/2017    REQUESTING PHYSICIAN:  Dr. Heredia    SERVICE:  Neurosurgery.    REASON FOR CONSULTATION:  Right temporal brain mass.    HISTORY OF PRESENT ILLNESS:  This is a 71-year-old female who has followed by   hematologist, Dr. Salomon.  She comes in with subacute complaints of headache,   nausea, vomiting.  This has been going on for the last several weeks; however,   it has gotten significantly worse in the last 2 to 3 days.  She has had some   weight loss over the last month, which she states was unintentional and _____   last 3 weeks.  At this time, her headaches are a bit improved after receiving   Decadron as well as her nausea and vomiting.    REVIEW OF SYSTEMS:  As noted above, complete and full review of system is   negative otherwise.    PAST MEDICAL HISTORY:  1.  Hypertension.  2.  Hyperlipidemia.  3.  Diabetes.  4.  Hiatal hernia.  5.  Stage 3 chronic kidney disease.    PAST SURGICAL HISTORY:  1.  Nephrectomy.  2.  Appendectomy.  3.  Cholecystectomy.  4.  Hemorrhoidectomy.    SOCIAL HISTORY:  Denies alcohol, tobacco, or illicit drug use.    FAMILY HISTORY:  Family history is significant for multiple myeloma and   hypertension.    ALLERGIES:  1.  CODEINE.  2.  DILAUDID.  3.  PERCOCET.  4.  PERCODAN.  5.  ULTRAM.  6.  VICODIN.    HOME MEDICATIONS:  1.  Aspirin 81 mg.  2.  Multivitamin.  3.  Fosamax.  4.  Zocor.  5.  Diabeta.  6.  Actos.  7.  Diltiazem.  8.  Metoprolol.  9.  Hytrin.    PHYSICAL EXAMINATION:  VITAL SIGNS:  Afebrile.  Vital signs are stable.  GENERAL:  No acute distress, lying in bed.  HEENT:  Normocephalic and atraumatic.  Pupils are equal and reactive to light   bilaterally.  Extraocular movements are intact.  Mucous membranes moist.  NECK:  Supple and soft.  No tenderness.  Good range of motion.  CARDIOVASCULAR:  Regular rate and rhythm.  PULMONARY:  Clear to auscultation bilaterally.  ABDOMEN:  Soft and nontender.  EXTREMITIES:  2+ peripheral pulses.  No clubbing,  cyanosis, or edema.  NEUROLOGICAL:  Cranial nerves are intact.  She has GCS of 15.  She has left   upper extremity drift, which is moderate.  She has mild left upper extremity   sensory deficit to light touch.  Motor strength is 5/5 throughout.  SKIN:  Skin is warm and dry.  PSYCHIATRIC:  Appropriate mood, affect, and judgement.    LABORATORY DATA:  Full review of labs is conducted in [facility].  It is notable for   a creatinine of 4.16, BUN of 42.  Coagulation profile is normal.    IMAGING:  Noncontrast CT of the head shows an intra axial mass in the right   posterior temporal lobe with surrounding vasogenic edema.  There is some mild   chronic herniation.    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  She has cerebral edema and   vasogenic edema.    PLAN:  1.  She will need an MRI of her brain as ordered by the hospitalist.    Unfortunately, she will not be able to get contrast due to her significant   kidney disease.  I will review the brain MRI and see how this looks before   formulating final plan.  2.  She does need a metastatic workup as noted by primary team.  We will wait   result of this before finalizing a neurosurgical plan.  3.  She takes aspirin 81 mg a day.  This needs to be held for 5 days prior to   surgery.  4.  We will continue to follow her and follow up on her studies.  She needs to   be continued on Decadron 4 mg IV or p.o. q. 6 hours.  This will give her   significant symptomatic relief.  I will start her on Keppra 500 mg p.o. b.i.d.   as temporal lobe is a significant epileptogenic center.       ____________________________________     Bry Ventura MD SA / ISA    DD:  01/07/2017 11:09:44  DT:  01/07/2017 14:22:44    D#:  [id number]  Job#:  [id number]

## 2017-01-07 NOTE — ED NOTES
Chief Complaint   Patient presents with   • Weakness     Pt BIB [facility] with c/o general weakness for past week   • Nausea     With dry heaves, denies vomiting

## 2017-01-07 NOTE — ED NOTES
Per Nimishasa pt has left side weakness, pt states she had a TIA right before Thanksgiving but did not seek medical for the issue. Per pt left side weakness is not any worse now, A&Ox4 at this time. ERP notified immediately.

## 2017-01-07 NOTE — PROGRESS NOTES
Patient seen and examined. 71 yr old female with CKD in with new onset severe headache. CT shows inctracranial mass. Neurosurgery following, MRI brain ordered. Added morphine for pain control. Zofran for nausea. Patient has Williams on CKD, unable to order contrast enhanced CT for work up, may have to consider CT PET whole body. Will get input from nephrology. D/w  Oncology.

## 2017-01-07 NOTE — ED NOTES
md at bedside , poc explained.Pt medicated as directed by ER md, poc update given to pt.  Transfer to [facility] pending

## 2017-01-07 NOTE — IP AVS SNAPSHOT
" <p align=\"LEFT\"><IMG SRC=\"//[email or web address]\" alt=\"Image\" WIDTH=\"50%\" HEIGHT=\"200\" BORDER=\"\"></p>                   Name:Yina Stephens  Medical Record Number:[id number]  CSN: [id number]    YOB: 1945   Age: 71 y.o.  Sex: female  HT:1.575 m (5' 2\") WT: 71.1 kg (156 lb 12 oz)          Admit Date: 1/7/2017     Discharge Date:   Today's Date: 1/27/2017  Attending Doctor:  Kathleen Young M.D.                  Allergies:  Codeine; Dilaudid; Percocet; Percodan; Ultram; and Vicodin          Your appointments     Jan 28, 2017  8:10 AM   Adult Draw/Collection with LAB SKILLED NURSING   LAB - SKILLED NURSING (--)    [address]  [place] [address] [phone]            Feb 06, 2017  1:00 PM   Radiation New Patient with Devin Martinez M.D.   [facility] Radiation Therapy (--)    [address] [address] [phone]              Follow-up Information     1. Follow up with Omer Park M.D.. Schedule an appointment as soon as possible for a visit in 2 weeks.    Specialty:  Family Medicine    Why:  Hospital follow-up appointment with PCP    Contact information    [address] [facility]  [place] [address] [phone]          2. Follow up with [facility] SKILL[facility]URSING .    Specialties:  Skilled Nursing Facility, Hospice, Rehabilitation    Contact information    [address] [facility]  [place] [address]           Medication List      Take these Medications        Instructions    acetaminophen 325 MG Tabs   Commonly known as:  TYLENOL    2 Tabs by Per NG Tube route every four hours as needed (Mild Pain; (Pain scale 1-3); Temp greater than 100.5 F).   Dose:  650 mg       alendronate 35 MG tablet   What changed:  how to take this   Commonly known as:  FOSAMAX    1 Tab by Per NG Tube route every 7 days.   Dose:  35 mg       alprazolam 0.25 MG Tabs   Commonly known as:  XANAX    1 Tab by Per NG Tube route at bedtime as needed for Anxiety.   Dose:  0.25 mg       ascorbic acid 500 MG tablet   Commonly " known as:  VITAMIN C    Take 1 Tab by mouth every day.   Dose:  500 mg       dexamethasone 1 MG Tabs   Commonly known as:  DECADRON    1 Tab by Nasogastric route 2 Times a Day.   Dose:  1 mg       doxazosin 1 MG Tabs   Commonly known as:  CARDURA    1 Tab by Per NG Tube route every bedtime.   Dose:  1 mg       ferrous sulfate 325 (65 FE) MG tablet    1 Tab by Per NG Tube route every morning with breakfast.   Dose:  325 mg       heparin 1000 units/mL 1000 units/mL Soln    3.7 mL by Intracatheter route as needed (CVC LOCK POST HD 1.8ML-RED PORT; 1.9ML-BLUE PORT).   Dose:  3700 Units       insulin glargine 100 UNIT/ML Soln   Commonly known as:  LANTUS    Inject 20 Units as instructed every evening.   Dose:  20 Units       insulin lispro 100 UNIT/ML Soln   Commonly known as:  HUMALOG    Inject 3-14 Units as instructed every 6 hours.   Dose:  3-14 Units       levetiracetam 100 MG/ML Soln   Commonly known as:  KEPPRA    5 mL by Nasogastric route every 12 hours.   Dose:  500 mg       metoprolol 25 MG Tabs   Commonly known as:  LOPRESSOR    1 Tab by Per NG Tube route 2 Times a Day.   Dose:  25 mg       miconazole 2%-zinc oxide 2 % Crea topical cream    Apply 1 Application to affected area(s) 2 Times a Day.   Dose:  1 Application       morphine 15 MG tablet   Commonly known as:  MS IR    1 Tab by Per NG Tube route every 6 hours as needed.   Dose:  15 mg       ondansetron 4 MG Tbdp   Commonly known as:  ZOFRAN ODT    1 Tab by Per NG Tube route every four hours as needed for Nausea/Vomiting (give PO if IV route is unavailable. May give per feeding tube.).   Dose:  4 mg       promethazine 25 MG Tabs   Commonly known as:  PHENERGAN    1 Tab by Per NG Tube route every 6 hours as needed for Nausea/Vomiting.   Dose:  25 mg       scopolamine 1.5 MG/3DAYS Pt72   Commonly known as:  TRANSDERM-SCOP    Apply 1 Patch to skin as directed every 72 hours.   Dose:  1 Patch       simvastatin 10 MG Tabs   What changed:    - medication  strength  - how much to take  - how to take this   Commonly known as:  ZOCOR    1 Tab by Per NG Tube route every evening.   Dose:  10 mg

## 2017-01-07 NOTE — ED PROVIDER NOTES
ED Provider Note    CHIEF COMPLAINT  Chief Complaint   Patient presents with   • Headache       HPI  Yina Stephens is a 71 y.o. female who presents to the emergency department with a headache. The patient has had this for some time. She states back around Thanksgiving she felt like she had a transient ischemic attack but did not seek medical help. She has some left-sided weakness at that time. She states she does have some generalized weakness at this time. She does not have any asymmetry in her weakness. She's also had nausea and vomiting with dry heaves. She's also had a profound headache. The patient went to [facility] emergency department for evaluation and she was found to have a large mass intracranially and therefore she was transferred here for neurosurgical evaluation.    REVIEW OF SYSTEMS  See HPI for further details. All other systems are negative.     PAST MEDICAL HISTORY  Past Medical History   Diagnosis Date   • Hypertension    • Dyslipidemia    • Hiatus hernia syndrome    • Pain    • Diabetes      oral meds   • Renal disorder      one kidney at 35%   • CATARACT      zuly iols       SOCIAL HISTORY  Social History     Social History   • Marital Status: Single     Spouse Name: N/A   • Number of Children: N/A   • Years of Education: N/A     Social History Main Topics   • Smoking status: Never Smoker    • Smokeless tobacco: None   • Alcohol Use: No   • Drug Use: No   • Sexual Activity: Not Asked      Comment: single, no kids, retired     Other Topics Concern   • None     Social History Narrative           PHYSICAL EXAM  VITAL SIGNS: Pulse 57  Temp(Src) 36.3 °C (97.4 °F)  Resp 16  Wt 72.576 kg (160 lb)  SpO2 94%  Constitutional: Mild acute distress, Non-toxic appearance.   HENT: Normocephalic, Atraumatic, tympanic membranes are intact and nonerythematous bilaterally, Oropharynx moist without exudates or erythema, Nose normal.   Eyes: PERRLA, EOMI, Conjunctiva normal.  Neck: Supple without  meningismus  Lymphatic: No lymphadenopathy noted.   Cardiovascular: Normal heart rate, Normal rhythm, No murmurs, No rubs, No gallops.   Thorax & Lungs: Normal breath sounds, No respiratory distress, No wheezing, No chest tenderness.   Abdomen: Bowel sounds normal, Soft, No tenderness, no rebound, no guarding, no distention, No masses, No pulsatile masses.   Skin: Warm, Dry, No erythema, No rash.   Back: No tenderness, No CVA tenderness.   Extremities: Atraumatic with symmetric distal pulses, No edema, No tenderness, No cyanosis, No clubbing.   Neurologic: Alert & oriented x 3, cranial nerves II through XII are intact, Normal motor function, Normal sensory function, No focal deficits noted.   Psychiatric: Affect normal, Judgment normal, Mood normal.       COURSE & MEDICAL DECISION MAKING  Pertinent Labs & Imaging studies reviewed. (See chart for details)  This is a 71-year-old female who presents to the emergency department with a large right temporal lobe mass with vasogenic edema and mass effect. Therefore no surgical be consulted. She'll receive Decadron. She is neurologically intact at this time. She'll be admitted to the hospitalist in guarded condition. The patient has a known history of renal insufficiency.    FINAL IMPRESSION  1. Intracranial mass  2. Renal insufficiency     Disposition  The patient will be admitted in guarded condition.    Electronically signed by: Mihir Rios, 1/7/2017 12:47 AM

## 2017-01-08 ENCOUNTER — APPOINTMENT (OUTPATIENT)
Dept: RADIOLOGY | Facility: MEDICAL CENTER | Age: 72
DRG: 025 | End: 2017-01-08
Attending: INTERNAL MEDICINE
Payer: MEDICARE

## 2017-01-08 PROBLEM — E11.22 TYPE 2 DIABETES MELLITUS WITH STAGE 3 CHRONIC KIDNEY DISEASE (HCC): Status: ACTIVE | Noted: 2017-01-08

## 2017-01-08 PROBLEM — N18.30 TYPE 2 DIABETES MELLITUS WITH STAGE 3 CHRONIC KIDNEY DISEASE (HCC): Status: ACTIVE | Noted: 2017-01-08

## 2017-01-08 LAB
ANION GAP SERPL CALC-SCNC: 12 MMOL/L (ref 0–11.9)
BUN SERPL-MCNC: 54 MG/DL (ref 8–22)
CALCIUM SERPL-MCNC: 8.5 MG/DL (ref 8.5–10.5)
CHLORIDE SERPL-SCNC: 108 MMOL/L (ref 96–112)
CO2 SERPL-SCNC: 17 MMOL/L (ref 20–33)
CREAT SERPL-MCNC: 4.47 MG/DL (ref 0.5–1.4)
ERYTHROCYTE [DISTWIDTH] IN BLOOD BY AUTOMATED COUNT: 51.6 FL (ref 35.9–50)
GFR SERPL CREATININE-BSD FRML MDRD: 10 ML/MIN/1.73 M 2
GLUCOSE BLD-MCNC: 183 MG/DL (ref 65–99)
GLUCOSE BLD-MCNC: 224 MG/DL (ref 65–99)
GLUCOSE BLD-MCNC: 286 MG/DL (ref 65–99)
GLUCOSE BLD-MCNC: 304 MG/DL (ref 65–99)
GLUCOSE SERPL-MCNC: 213 MG/DL (ref 65–99)
HCT VFR BLD AUTO: 37.1 % (ref 37–47)
HGB BLD-MCNC: 11.7 G/DL (ref 12–16)
MCH RBC QN AUTO: 30.1 PG (ref 27–33)
MCHC RBC AUTO-ENTMCNC: 31.5 G/DL (ref 33.6–35)
MCV RBC AUTO: 95.4 FL (ref 81.4–97.8)
PLATELET # BLD AUTO: 200 K/UL (ref 164–446)
PMV BLD AUTO: 10.9 FL (ref 9–12.9)
POTASSIUM SERPL-SCNC: 4.4 MMOL/L (ref 3.6–5.5)
RBC # BLD AUTO: 3.89 M/UL (ref 4.2–5.4)
SODIUM SERPL-SCNC: 137 MMOL/L (ref 135–145)
WBC # BLD AUTO: 7.6 K/UL (ref 4.8–10.8)

## 2017-01-08 PROCEDURE — A9270 NON-COVERED ITEM OR SERVICE: HCPCS

## 2017-01-08 PROCEDURE — 99233 SBSQ HOSP IP/OBS HIGH 50: CPT | Performed by: INTERNAL MEDICINE

## 2017-01-08 PROCEDURE — 770020 HCHG ROOM/CARE - TELE (206)

## 2017-01-08 PROCEDURE — 700102 HCHG RX REV CODE 250 W/ 637 OVERRIDE(OP): Performed by: HOSPITALIST

## 2017-01-08 PROCEDURE — 700111 HCHG RX REV CODE 636 W/ 250 OVERRIDE (IP): Performed by: INTERNAL MEDICINE

## 2017-01-08 PROCEDURE — 700102 HCHG RX REV CODE 250 W/ 637 OVERRIDE(OP): Performed by: NEUROLOGICAL SURGERY

## 2017-01-08 PROCEDURE — 80048 BASIC METABOLIC PNL TOTAL CA: CPT

## 2017-01-08 PROCEDURE — 700111 HCHG RX REV CODE 636 W/ 250 OVERRIDE (IP): Performed by: HOSPITALIST

## 2017-01-08 PROCEDURE — 36415 COLL VENOUS BLD VENIPUNCTURE: CPT

## 2017-01-08 PROCEDURE — 82962 GLUCOSE BLOOD TEST: CPT

## 2017-01-08 PROCEDURE — 85027 COMPLETE CBC AUTOMATED: CPT

## 2017-01-08 PROCEDURE — 700112 HCHG RX REV CODE 229: Performed by: HOSPITALIST

## 2017-01-08 PROCEDURE — A9270 NON-COVERED ITEM OR SERVICE: HCPCS | Performed by: HOSPITALIST

## 2017-01-08 PROCEDURE — 700102 HCHG RX REV CODE 250 W/ 637 OVERRIDE(OP)

## 2017-01-08 PROCEDURE — A9270 NON-COVERED ITEM OR SERVICE: HCPCS | Performed by: INTERNAL MEDICINE

## 2017-01-08 PROCEDURE — 700102 HCHG RX REV CODE 250 W/ 637 OVERRIDE(OP): Performed by: INTERNAL MEDICINE

## 2017-01-08 PROCEDURE — A9270 NON-COVERED ITEM OR SERVICE: HCPCS | Performed by: NEUROLOGICAL SURGERY

## 2017-01-08 PROCEDURE — 76775 US EXAM ABDO BACK WALL LIM: CPT

## 2017-01-08 RX ORDER — SODIUM BICARBONATE 650 MG/1
1300 TABLET ORAL DAILY
Status: DISCONTINUED | OUTPATIENT
Start: 2017-01-08 | End: 2017-01-24

## 2017-01-08 RX ORDER — HEPARIN SODIUM 5000 [USP'U]/ML
5000 INJECTION, SOLUTION INTRAVENOUS; SUBCUTANEOUS EVERY 8 HOURS
Status: DISPENSED | OUTPATIENT
Start: 2017-01-08 | End: 2017-01-14

## 2017-01-08 RX ORDER — AMLODIPINE BESYLATE 5 MG/1
5 TABLET ORAL
Status: DISCONTINUED | OUTPATIENT
Start: 2017-01-08 | End: 2017-01-18

## 2017-01-08 RX ADMIN — DEXAMETHASONE SODIUM PHOSPHATE 4 MG: 4 INJECTION, SOLUTION INTRAMUSCULAR; INTRAVENOUS at 11:51

## 2017-01-08 RX ADMIN — PIOGLITAZONE 30 MG: 30 TABLET ORAL at 08:57

## 2017-01-08 RX ADMIN — LEVETIRACETAM 500 MG: 500 TABLET, FILM COATED ORAL at 08:56

## 2017-01-08 RX ADMIN — INSULIN LISPRO 5 UNITS: 100 INJECTION, SOLUTION INTRAVENOUS; SUBCUTANEOUS at 17:55

## 2017-01-08 RX ADMIN — DILTIAZEM HYDROCHLORIDE 240 MG: 240 CAPSULE, COATED, EXTENDED RELEASE ORAL at 08:56

## 2017-01-08 RX ADMIN — HEPARIN SODIUM 5000 UNITS: 5000 INJECTION INTRAVENOUS; SUBCUTANEOUS at 20:51

## 2017-01-08 RX ADMIN — SIMVASTATIN 10 MG: 20 TABLET, FILM COATED ORAL at 20:51

## 2017-01-08 RX ADMIN — INSULIN LISPRO 2 UNITS: 100 INJECTION, SOLUTION INTRAVENOUS; SUBCUTANEOUS at 05:37

## 2017-01-08 RX ADMIN — DEXAMETHASONE SODIUM PHOSPHATE 4 MG: 4 INJECTION, SOLUTION INTRAMUSCULAR; INTRAVENOUS at 18:09

## 2017-01-08 RX ADMIN — METOPROLOL SUCCINATE 50 MG: 50 TABLET, EXTENDED RELEASE ORAL at 20:51

## 2017-01-08 RX ADMIN — LEVETIRACETAM 500 MG: 500 TABLET, FILM COATED ORAL at 20:51

## 2017-01-08 RX ADMIN — SODIUM BICARBONATE TAB 650 MG 1300 MG: 650 TAB at 14:35

## 2017-01-08 RX ADMIN — DOCUSATE SODIUM 100 MG: 100 CAPSULE ORAL at 08:56

## 2017-01-08 RX ADMIN — Medication 1 TABLET: at 20:51

## 2017-01-08 RX ADMIN — METOPROLOL SUCCINATE 50 MG: 50 TABLET, EXTENDED RELEASE ORAL at 08:56

## 2017-01-08 RX ADMIN — INSULIN LISPRO 6 UNITS: 100 INJECTION, SOLUTION INTRAVENOUS; SUBCUTANEOUS at 20:53

## 2017-01-08 RX ADMIN — INSULIN LISPRO 3 UNITS: 100 INJECTION, SOLUTION INTRAVENOUS; SUBCUTANEOUS at 11:01

## 2017-01-08 RX ADMIN — DEXAMETHASONE SODIUM PHOSPHATE 4 MG: 4 INJECTION, SOLUTION INTRAMUSCULAR; INTRAVENOUS at 05:37

## 2017-01-08 RX ADMIN — HEPARIN SODIUM 5000 UNITS: 5000 INJECTION INTRAVENOUS; SUBCUTANEOUS at 18:08

## 2017-01-08 RX ADMIN — AMLODIPINE BESYLATE 5 MG: 5 TABLET ORAL at 11:51

## 2017-01-08 RX ADMIN — METOPROLOL SUCCINATE 50 MG: 50 TABLET, EXTENDED RELEASE ORAL at 14:37

## 2017-01-08 ASSESSMENT — COPD QUESTIONNAIRES
COPD SCREENING SCORE: 3
HAVE YOU SMOKED AT LEAST 100 CIGARETTES IN YOUR ENTIRE LIFE: NO/DON'T KNOW
DURING THE PAST 4 WEEKS HOW MUCH DID YOU FEEL SHORT OF BREATH: NONE/LITTLE OF THE TIME
DO YOU EVER COUGH UP ANY MUCUS OR PHLEGM?: YES, A FEW DAYS A WEEK OR MONTH

## 2017-01-08 ASSESSMENT — ENCOUNTER SYMPTOMS
FEVER: 0
BLURRED VISION: 0
PND: 0
HEARTBURN: 0
HEADACHES: 0
CHILLS: 0
ABDOMINAL PAIN: 0
VOMITING: 0
DIZZINESS: 0
COUGH: 0
NAUSEA: 1
HEADACHES: 1
DOUBLE VISION: 0
SHORTNESS OF BREATH: 0

## 2017-01-08 ASSESSMENT — PATIENT HEALTH QUESTIONNAIRE - PHQ9
SUM OF ALL RESPONSES TO PHQ QUESTIONS 1-9: 0
SUM OF ALL RESPONSES TO PHQ9 QUESTIONS 1 AND 2: 0
1. LITTLE INTEREST OR PLEASURE IN DOING THINGS: NOT AT ALL
2. FEELING DOWN, DEPRESSED, IRRITABLE, OR HOPELESS: NOT AT ALL

## 2017-01-08 ASSESSMENT — LIFESTYLE VARIABLES: DO YOU DRINK ALCOHOL: NO

## 2017-01-08 NOTE — CONSULTS
DATE OF SERVICE:  01/08/2017    REASON FOR CONSULTATION:  Brain mass.    HISTORY OF PRESENT ILLNESS:  This is a very pleasant 71-year-old female who I   see in the outpatient setting for history of some B12 deficiency, which has   been corrected.  She more significantly has a history of chronic kidney   disease stage III, followed by [facility] nephrology.  She also has hypertension,   type 2 diabetes.  She presented to [facility] ER complaining of headaches and   some nausea and some vomiting episode.  In talking with her and her family in   the room it seems maybe since Thanksgiving, she has had some symptoms.  She   had an episode even prior to Thanksgiving when they were going to [place] _____   she maybe was little bit more confused and was speaking foul language, which   was atypical for her.  Then, things kind of resolved.  I guessed she followed   up with the kidney doctors after that and then discontinued.  She did have a   CT scan done in the emergency room without contrast.  There was concern for   mass on the scan from 01/06/2017.  It was a large right temporal mass with   some edema.  She was then brought up to the [facility] hospital for further   evaluation.    She has since seen neurosurgery.  She has had an MRI without contrast because   of her renal failure.  Her MRI showed a large mass, partially hemorrhagic in   the right temporal lobe, extensive vasogenic edema, they felt most likely   consistent with glioblastoma multiforme.  There is some mild mass effect as   well.  She is now just waiting and the plan is for surgery, but because she   had been on aspirin, they have to wait.    The neurosurgeon also wanted a metastatic workup done.    PAST MEDICAL HISTORY:  1.  Chronic kidney disease stage III.  2.  Hypertension.  3.  Type 2 diabetes.  4.  Hyperlipidemia.  5.  B12 deficiency, on once a month B12.    PAST SURGICAL HISTORY:  1.  Nephrectomy secondary to trauma in the past during appendectomy.  2.   Appendectomy.  3.  Cholecystectomy.  4.  Hemorrhoidectomy.    ALLERGIES:  TO CODEINE, DILAUDID, PERCOCET, PERCODAN, ULTRAM, VICODIN in the   chart.    SOCIAL HISTORY:  She is a retired LPN, no alcohol.  No tobacco.  She has her   children here in the room with her today.    FAMILY HISTORY:  She has a brother who had multiple myeloma.    MEDICATIONS:  Currently Decadron 4 mg IV, diltiazem, Colace, Senokot, insulin   sliding scale, Keppra 500 twice a day, which is new, Toprol 3 times a day,   Actos, Zocor.    PHYSICAL EXAMINATION:  GENERAL:  She is alert and oriented x3.  She definitely feels better than she   did the other day.  She is on some oxygen.  GENERAL:  She is alert and oriented x3, no other focal findings.  LUNGS:  Clear bilaterally.  CARDIOVASCULAR:  Regular rhythm.  ABDOMEN:  Soft, nontender.  EXTREMITIES:  _____.  NEUROLOGIC:  Intact.  No other focal findings.  She is alert and oriented x3.    She is appropriate.    LABORATORIES:  From today, hemoglobin is 8.7, white count 7.6, platelets   200,000.  This is good for her.  Creatinine is 4.47, normal calcium.    IMPRESSION AND PLAN:  1.  Oncology, I had a long discussion with her and her family today.  We   discussed that more likely than not this looks more like a primary brain   tumor.  Obviously, if we had contrast it will may be a little bit more   information, but with her renal failure it is not possible.  Nephrology has   seen the patient and recommends avoiding any other medications that could   cause injury to her kidney.    We discussed that if it is absolute a PET CT would be relatively safe for   other staging, but to me this seems like more of a primary brain tumor.    We discussed just the generalities if this is truly a glioblastoma and what   that meant.  She understands that surgery is a big component and then   radiation therapy and then oral chemotherapy such as Temodar.  I told her that   the guidelines with her renal dysfunction is  much more unclear with Temodar.    I told her the first step is really pathology.  I told her I would try just   see her after she gets out of the hospital and has had surgery.  I told my   partner, Dr. Lemon will be rounding in the hospital, starting Monday for a   week.  I will inform her of the patient, but she will not formally round every   day.  Please call if there are questions.    The family and the patient know this is a significant crossroad.    2.  Type 2 diabetes.  Primary care service will be managing this.  3.  Chronic kidney disease.  Appreciate nephrology seeing the patient and   following along.  4.  B12 deficiency.  Her hemoglobin is good.  This is the best that has been.    When I met her, she was in the 9 to 10 range and she is averaging anywhere   from 11 to 12 now.  No other recommendations are in perspective.       ____________________________________     JV VRobert FARRIS MD    SVR / NTS    DD:  01/08/2017 14:42:34  DT:  01/08/2017 15:21:58    D#:  [id number]  Job#:  [id number]    cc: Rafael Kaufman MD, VINICIUS MISTRY MD

## 2017-01-08 NOTE — CONSULTS
Oncology    Pt seen and examined.  Consult to be dictated.  Will have to wait on path but seems most consistent with primary.  I feel with her underlying CRF will be difficult to do standard imaging.  CT/PET would be the way to go for staging if needed.  This seems like a primary brain.  However, even if it is not would still need to be addressed.    She can see me 1 week or so post surgery as outpatient.  I will let my partner Dr Lemon aware of her but will not be actively rounding on her unless needed.    Thanks  Tio Salomon MD

## 2017-01-08 NOTE — CONSULTS
DATE OF SERVICE:  01/08/2017    REQUESTING PHYSICIAN:  Hospitalist service.    REASON FOR CONSULTATION:  Evaluation and management of acute renal failure.    HISTORY OF PRESENT ILLNESS:  This is a 71-year-old  female with past   medical history significant for chronic kidney disease stage III, followed by   [facility], hypertension, diabetes mellitus type 2, who   initially presented to [facility] with complaints of   headaches, nausea and vomiting and imaging while there revealed a new   intracranial mass and so patient was transferred to [facility] for further care   and noted to have acute renal failure with a creatinine of 4.1.  Patient   reports that at baseline, she has CKD stage III, followed by Dr. Nikolay Pierce   of [facility].  She was last seen in the office about 1 week ago   and was told that her kidney function was worse than usual.  Review of the   chart here at [facility] revealed that her creatinine in December 2016 was 3.52.    The patient and family reports that her p.o. intake has been very poor for the   past several months, but even worse over the past 3-4 weeks.  Patient reports   that she has absolutely no appetite and has not been eating or drinking her   much at all.  She has also been having nausea and vomiting associated with   this.  In the emergency room, she was started on Decadron.  Neurosurgery was   consulted and an MRI without IV gadolinium revealed a large right temporal   lobe mass with midline shift and surrounding vasogenic edema.  Patient reports   that her headaches and nausea and vomiting are improved today, her creatinine   is a little bit higher today at 4.47.  She reports good urine output.  She   has increased her p.o. fluid intake this morning and so far is tolerating p.o.    She reports that she has discussed her renal disease with Dr. Pierce as well   as the nurse practitioner at [facility] and she reports that  she   would never want hemodialysis in the future, even if it is indicated.    REVIEW OF SYSTEMS:  Significant for decreased p.o. intake for the past couple   of months, but worse over the past 3-4 weeks.  She reports nausea and   vomiting.  She also reports malaise and generalized weakness and fatigue.  She   has no fevers or chills.  She reports headaches on a daily basis, but has   improved today with pain control.  She denies any lower extremity edema.  No   chest pain or shortness of breath.  She has occasional lightheadedness, no   dizziness, and the rest of the 12-point review of systems is negative.    PAST MEDICAL HISTORY:  1.  Chronic kidney disease stage III, unclear baseline.  Patient follows with   Dr. Nikolay Pierce of [facility].  2.  Hypertension.  3.  Diabetes mellitus type 2.  4.  Hyperlipidemia.    PAST SURGICAL HISTORY:  1.  Nephrectomy secondary to trauma during appendectomy.  2.  Appendectomy.  3.  Cholecystectomy.  4.  Hemorrhoidectomy.    ALLERGIES:  PATIENT REPORTS THAT SHE IS ALLERGIC TO CODEINE, DILAUDID,   PERCOCET, PERCODAN, ULTRAM AND VICODIN.    SOCIAL HISTORY:  The patient is a retired LPN.  She denies any tobacco,   alcohol or illicit drug use.  She has several supportive family members.    FAMILY HISTORY:  There is a family history of a brother with multiple myeloma.    CURRENT MEDICATIONS:  1.  Decadron 4 mg IV every 6 hours.  2.  Diltiazem  mg daily.  3.  Colace 100 mg daily.  4.  Senokot 1 tablet nightly.  5.  Insulin sliding scale.  6.  Keppra 500 mg twice daily.  7.  Toprol-XL 50 mg 3 times daily.  8.  Actos 30 mg daily.  9.  Zocor 10 mg nightly.    PHYSICAL EXAMINATION:  VITAL SIGNS:  Temperature is 36.4 degrees Celsius, pulse 66, respirations 16,   blood pressure 165/61, satting 91% on 2 liters nasal cannula.  GENERAL:  The patient is alert and oriented x3 in no acute distress.  She   appears well developed and well nourished.  HEENT:  Pupils are equal, round  and reactive to light.  Extraocular muscles   are intact.  Mucous membranes appear dry.  NECK:  Exam reveals no JVD.  Trachea is midline.  There is no thyromegaly.    CARDIOVASCULAR:  Heart is regular rate and rhythm.  No murmurs, rubs or   gallops.  RESPIRATORY:  Lungs are generally clear to auscultation bilaterally.  No   wheezes, rales or rhonchi.  There is no tachypnea and there is no increased   work of breathing.  GASTROINTESTINAL:  Soft, nontender, nondistended.  Bowel sounds are present.  EXTREMITIES:  Reveals no clubbing, cyanosis or edema.  SKIN:  Reveals no rashes or ulcerations.  There is some decreased skin turgor.  NEUROLOGIC:  Reveals no focal motor deficits.  Sensation is grossly intact to   light touch.  LYMPHATIC:  Exam reveals no cervical lymphadenopathy, no axillary   lymphadenopathy.  PSYCHIATRIC:  The patient is alert and oriented x3.  She has an appropriate   mood and affect.    LABORATORY DATA:  Labs reveal a white blood cell count of 7.6, hemoglobin   11.7, platelet count is 200.  Chemistries reveal a sodium of 137, potassium   4.4, chloride is 108, bicarbonate is 17, BUN 54, creatinine is 4.47, calcium   is 8.5.  INR is 1.15.    IMAGING:  Chest x-ray on 01/06/2017 reveals no acute cardiopulmonary   abnormality.  CT scan of the head without IV contrast on 01/06/2017 reveals   large right temporal lobe mass with resultant vasogenic edema and mass effect   primary brain malignancy would be likely versus a large solitary metastasis.    MRI of the brain without gadolinium on 01/07/2017 revealed a large   heterogeneous partly hemorrhagic right temporal lobe mass with extensive   vasogenic edema, most consistent with glioblastoma multiforme.  There is a   flair hyperintensity in the periventricular/subependymal white matter around   the occipital horns and trigones, which may represent transependymal edema or   subependymal tumor spread.  There was mass effect with right to left shift of   the  midline structures and flattening of the right middle cerebral peduncle in   the mid brain.  There was mild supratentorial white matter disease elsewhere   in the cerebral hemispheres, most consistent with microvascular ischemic   change.  There is an incidental partially empty sella.    ASSESSMENT AND PLAN:  1.  Acute kidney injury on chronic kidney disease stage III.  The patient   reports that her last office visit, 1 week ago, she was told that her renal   function was worse than usual.  Unclear what her baseline creatinine is.  Her   creatinine is 4.2 on admission now and she reportedly has disease stage III at   baseline.  Her elevated creatinine is likely related to prerenal etiology   given her poor p.o. intake.  At this time, there is no acute need for   hemodialysis, and patient reports that she would never want dialysis in the   future, even if it is indicated and that she has discussed this with her   primary nephrologist in the past.  She is of the same mindset now, the patient   is currently tolerating p.o. and she has increased her p.o. intake of fluid   today, recommend checking urine electrolytes and renal ultrasound.  Recommend   avoiding any IV contrast, nephrotoxins or NSAIDs and 60.  The patient is also   at risk for nephrogenic systemic fibrosis with gadolinium exposure given her   decreased glomerular filtration rate under 30, recommend avoiding gadolinium   if possible.  However, if gadolinium is necessary for further imaging, then   this would necessitate initiating dialysis within 1 hour of gadolinium   exposure.  The patient would need 3 consecutive days of dialysis in an effort   to clear the gadolinium with her decreased glomerular filtration rate and   decrease the risk of nephrogenic systemic fibrosis.  Recommend dose adjusting   all of her medications for her decreased GFR.  2.  Intracranial mass.  MRI shows a large heterogeneous probably hemorrhagic   right temporal lobe mass with  extensive vasogenic edema and midline shift   consistent with glioblastoma Decadron has been started.  Neurosurgery is   following and oncology, Dr. Salomon is also following.  3.  Hypertension, suboptimal control.  Continue home blood pressure   medications, metoprolol was increased today, recommend starting Norvasc 5 mg   daily if her blood pressure remains elevated, recommend avoiding any ACE   inhibitors or ARBs for now.  4.  Diabetes mellitus type 2.  Management per the primary service.  5.  This is secondary to chronic kidney disease.  Goal hemoglobin is between   10-11.  There is no need for erythrocyte stimulating agent at this time.  6.  Metabolic acidosis.  Recommend starting p.o. bicarbonate supplements.    Thank you for this very interesting consult and thank you for involving me in   the care of this very pleasant patient.  If you have any questions or need any   further information, please do not hesitate to contact me.       ____________________________________     MD ARTURO MELO / ISA    DD:  01/08/2017 11:22:32  DT:  01/08/2017 13:17:33    D#:  [id number]  Job#:  [id number]

## 2017-01-08 NOTE — CONSULTS
[facility] Consult Note    Patient seen and examined, please see my dictated consult note for full details  71yoF with PMH significant for CKD Stage III, HTN, DM II, admitted with complaints of headaches/n/v and imaging at [facility] revealed new intracranial mass and pt transferred to [facility] for further care and noted to have ANNABEL.   Assessment/Plan:  1. ANNABEL on CKD Stage III--pt reports that at last office visit 1 week ago she was told her renal function was worse than usual, Cr 4.2 on admission now, unclear baseline but reportedly Stage III CKD at baseline. Elevated Cr likely related to pre-renal etiology given her poor PO intake  --Pt reports that she would never want dialysis in the future even if it is indicated and that she had discussed this with Dr. Pierce in the past.  --Pt tolerating PO and has increased her PO fluid intake today  --check urine electrolytes and renal ultrasound  --Avoid IV contrast/nephrotoxins/NSAIDs  --Pt also at risk for Nephrogenic Systemic Fibrosis with gadolinium, and if gadolinium is necessary for further imaging, pt will need to be on dialysis within 1hr of gadolinium exposure and will need 3 consecutive days of dialysis.   --Dose adjust meds for decreased GFR  2. Intracranial Mass--MRI shows large heterogeneous partly hemorrhagic R temporal lobe mass with extensive vasogenic edema and midline shift consistent with glioblastoma multiforme  --On decadron  --Neurosurgery following  --Oncology Dr. Salomon also following  3. HTN--sub-optimal control  --Continue home BP meds  --Start norvasc 5mg daily  --Avoid ACE-I/ARB for now  4. DM II--management per primary svc  5. Anemia--secondary to CKD, no need for TANISHA  6. Metabolic Acidosis--start PO bicarb supplements    Report Confirmation #[id number]

## 2017-01-08 NOTE — PROGRESS NOTES
Hospital Medicine Progress Note, Adult, Complex               Author: Rafael Kaufman Date & Time created: 1/8/2017  3:53 PM     Interval History:  71 year old female with PMHx DM type 2, CKD who presented with headache and N/V and was found to have large right temporal intracranial mass on CT head    Headache has improved. Notes some nausea but no vomiting.  MRI brain reveals large right temporal lobe mass with extensive vasogenic edema most consistent with glioblastoma multiforme. There is R>L midline shift.  Cr continues to increase, today at 4.47.     Had discussion with patient and family regarding goals of care. Patient would like to proceed with Surgery and further evaluation of mass. Regarding her worsening kidney function, patient stated that she would not like to undergo Dialysis. Due to this reason we will hold off on any IV contrast or nephrotoxins given high risk of progression to end stage renal disease. Plan would be to undergo surgery and then follow up with Oncology and if needed, a CT PET can be done for further screening. Case was discussed with Nephrology and Oncology as well.    Review of Systems:  Review of Systems   Constitutional: Negative for fever and chills.   Eyes: Negative for blurred vision and double vision.   Respiratory: Negative for cough and shortness of breath.    Cardiovascular: Negative for chest pain and PND.   Gastrointestinal: Positive for nausea. Negative for heartburn, vomiting and abdominal pain.   Genitourinary: Negative for dysuria.   Neurological: Negative for dizziness and headaches.   All other systems reviewed and are negative.      Physical Exam:  Physical Exam   Constitutional: She is oriented to person, place, and time. She appears well-developed and well-nourished.   HENT:   Head: Normocephalic and atraumatic.   Eyes: Conjunctivae are normal.   Cardiovascular: Normal rate and regular rhythm.    Pulmonary/Chest: Effort normal and breath sounds normal. No respiratory  distress. She has no wheezes. She has no rales.   Abdominal: Soft. Bowel sounds are normal. She exhibits no distension. There is no tenderness.   Musculoskeletal: Normal range of motion.   Neurological: She is alert and oriented to person, place, and time. No cranial nerve deficit. Coordination normal.   Psychiatric: She has a normal mood and affect. Her behavior is normal.   Nursing note and vitals reviewed.      Labs:        Invalid input(s): [id number]  Recent Labs      17   0355   TROPONINI  0.05*  0.07*   BNPBTYPENAT  446*   --      Recent Labs      17   0308   SODIUM  142  138  137   POTASSIUM  4.1  4.2  4.4   CHLORIDE  113*  108  108   CO2  15*  18*  17*   BUN  42*  49*  54*   CREATININE  4.16*  4.19*  4.47*   CALCIUM  8.8  8.9  8.5     Recent Labs      17   0308   ALTSGPT  13   --    --    ASTSGOT  10*   --    --    ALKPHOSPHAT  56   --    --    TBILIRUBIN  1.0   --    --    LIPASE  23   --    --    GLUCOSE  177*  225*  213*     Recent Labs      17   0308   RBC  4.06*  4.07*  3.89*   HEMOGLOBIN  12.7  12.4  11.7*   HEMATOCRIT  38.5  38.4  37.1   PLATELETCT  214  198  200   PROTHROMBTM  14.5   --    --    APTT  26.6   --    --    INR  1.15*   --    --      Recent Labs      17   0308   WBC  9.3  7.8  7.6   NEUTSPOLYS  83.70*  89.10*   --    LYMPHOCYTES  10.40*  8.10*   --    MONOCYTES  5.30  1.50   --    EOSINOPHILS  0.00  0.00   --    BASOPHILS  0.20  0.10   --    ASTSGOT  10*   --    --    ALTSGPT  13   --    --    ALKPHOSPHAT  56   --    --    TBILIRUBIN  1.0   --    --            Hemodynamics:  Temp (24hrs), Av.7 °C (98 °F), Min:36.1 °C (97 °F), Max:37.2 °C (98.9 °F)  Temperature: 36.1 °C (97 °F)  Pulse  Av  Min: 57  Max: 120   Blood Pressure : 157/72 mmHg     Respiratory:    Respiration: 16, Pulse Oximetry: 94  %     Work Of Breathing / Effort: Mild  RUL Breath Sounds: Clear, RML Breath Sounds: Clear, RLL Breath Sounds: Clear, SOURAV Breath Sounds: Clear, LLL Breath Sounds: Clear  Fluids:  No intake or output data in the 24 hours ending 01/08/17 1553     GI/Nutrition:  Orders Placed This Encounter   Procedures   • Diet Order     Standing Status: Standing      Number of Occurrences: 1      Standing Expiration Date:      Order Specific Question:  Diet:     Answer:  Diabetic [3]     Medical Decision Making, by Problem:  Active Hospital Problems    Diagnosis   • • Intracranial mass [R90.0]   -Likely Glioblastoma per MRI  -will need surgical resection, NEurosurgery on board  -hold aspirin  continue Steroids  -Continue Keppra  -Ok for DVT prophylaxis, will hold dose prior to day of surgery    • Acute on chronic renal insufficiency (HCC) [N28.9, N18.9]   -worsening   -increase PO intake  -Avoid IV contrast/nephrotoxins/NSAIDs  -Dose adjust meds for decreased GFR  -continue to monitor BMP    Type 2 diabetes mellitus with stage 3 chronic kidney disease (HCC) [E11.22, N18.3]  -uncontrolled likely due to steroids  -check HbA1c  -resume home regimen of Actos  -continue ISS with serial accu-checks  -monitor BMP           • HTN (hypertension) [I10]  -well controlled  -continue toprol xl, cardizem and amlodipine  -Labetalol PRN for SBP > 160     I spent a total of 40 minutes during this clinical encounter of which > 50% was devoted to counseling and coordinating care including review of records, pertinent lab data and studies, as well as discussing diagnostic evaluation and work up, planned therapeutic interventions and future disposition of care. Where indicated, the assessment and plan reflect discussion of patient with consultants, other healthcare providers, family members, and additional research needed to obtain further information in formulating the plan of care of this patient. This time includes no procedures or overlap with other  providers.    Labs reviewed, Medications reviewed and Radiology images reviewed        DVT Prophylaxis: Heparin

## 2017-01-08 NOTE — PROGRESS NOTES
Received patient at change of shift resting comfortable in bed awake, alert, oriented.  Family at bedside.  IV to site to right hand in place, clean, dry, intact.  Oxygen 2 liter via nasal cannula in use.  SCD's to bilateral lower extremities noted.  Patient ambulates to bathroom using front wheel walker and 1 person assist.  Bed alarm activated.  Safety and comfort measures maintained.  Call light within reach.    22:30 Tele monitor applied, SR reported by tele monitoring room, HR 62.

## 2017-01-08 NOTE — CARE PLAN
Problem: Safety  Goal: Will remain free from falls  Intervention: Implement fall precautions  Safety and fall precautions in place.  Bed in low position, upper side rails X 2, non slip socks applied. Call light within reach.      Problem: Venous Thromboembolism (VTW)/Deep Vein Thrombosis (DVT) Prevention:  Goal: Patient will participate in Venous Thrombosis (VTE)/Deep Vein Thrombosis (DVT)Prevention Measures  Outcome: PROGRESSING AS EXPECTED  Patient ambulating as tolerated. SCD's applied to bilateral lower extremities.    Problem: Mobility  Goal: Risk for activity intolerance will decrease  Outcome: PROGRESSING AS EXPECTED

## 2017-01-08 NOTE — PROGRESS NOTES
Progress Note               Author: Ramesh Prince Date & Time created: 1/8/2017  8:35 AM     Interval History:  Pt stable  Headache and nausea improved  She is a bit confused on questioning   MRI is reviewed and appears to be a glio based tumor     Review of Systems:  Review of Systems   Neurological: Positive for headaches.       Physical Exam:  Physical Exam   Constitutional: She is oriented to person, place, and time.   Neurological: She is alert and oriented to person, place, and time.   Alert to person and place only   Slight left generalized leg weakness  CN 2-12 grossly intact   Is a bit confused on questioning, but overall appropriate  Slight decreased bilateral hand coordination        Labs:        Invalid input(s): [id number]  Recent Labs      01/06/17 2158 01/07/17   0355   TROPONINI  0.05*  0.07*   BNPBTYPENAT  446*   --      Recent Labs      01/06/17 2158 01/07/17 1738 01/08/17   0308   SODIUM  142  138  137   POTASSIUM  4.1  4.2  4.4   CHLORIDE  113*  108  108   CO2  15*  18*  17*   BUN  42*  49*  54*   CREATININE  4.16*  4.19*  4.47*   CALCIUM  8.8  8.9  8.5     Recent Labs      01/06/17 2158 01/07/17 1738 01/08/17   0308   ALTSGPT  13   --    --    ASTSGOT  10*   --    --    ALKPHOSPHAT  56   --    --    TBILIRUBIN  1.0   --    --    LIPASE  23   --    --    GLUCOSE  177*  225*  213*     Recent Labs      01/06/17 2158 01/07/17 1738 01/08/17   0308   RBC  4.06*  4.07*  3.89*   HEMOGLOBIN  12.7  12.4  11.7*   HEMATOCRIT  38.5  38.4  37.1   PLATELETCT  214  198  200   PROTHROMBTM  14.5   --    --    APTT  26.6   --    --    INR  1.15*   --    --      Recent Labs      01/05/17   1343  01/06/17 2158 01/07/17 1738 01/08/17   0308   WBC  6.5  9.3  7.8  7.6   NEUTSPOLYS  71.10  83.70*  89.10*   --    LYMPHOCYTES  21.00*  10.40*  8.10*   --    MONOCYTES  6.00  5.30  1.50   --    EOSINOPHILS  0.90  0.00  0.00   --    BASOPHILS  0.50  0.20  0.10   --    ASTSGOT   --   10*   --     --    ALTSGPT   --   13   --    --    ALKPHOSPHAT   --   56   --    --    TBILIRUBIN   --   1.0   --    --      Hemodynamics:  Temp (24hrs), Av.9 °C (98.4 °F), Min:36.4 °C (97.6 °F), Max:37.2 °C (98.9 °F)  Temperature: 36.4 °C (97.6 °F)  Pulse  Av.6  Min: 57  Max: 120   Blood Pressure : 140/80 mmHg     Respiratory:    Respiration: 18, Pulse Oximetry: 92 %     Work Of Breathing / Effort: Mild  RUL Breath Sounds: Clear, RML Breath Sounds: Clear, RLL Breath Sounds: Clear, SOURAV Breath Sounds: Clear, LLL Breath Sounds: Clear  Fluids:  No intake or output data in the 24 hours ending 17 0835     GI/Nutrition:  Orders Placed This Encounter   Procedures   • Diet Order     Standing Status: Standing      Number of Occurrences: 1      Standing Expiration Date:      Order Specific Question:  Diet:     Answer:  Diabetic [3]     Medical Decision Making, by Problem:  Active Hospital Problems    Diagnosis   • Intracranial mass [R90.0]       Plan:  Continue IM/Onc care  Dr. Ventura to review MRI and make surgical plan  Continue steroids and Keppra   Hold ASA   Will continue to follow     Labs reviewed, Radiology images reviewed and Medications reviewed

## 2017-01-09 LAB
ANION GAP SERPL CALC-SCNC: 9 MMOL/L (ref 0–11.9)
BUN SERPL-MCNC: 71 MG/DL (ref 8–22)
CALCIUM SERPL-MCNC: 7.7 MG/DL (ref 8.5–10.5)
CHLORIDE SERPL-SCNC: 105 MMOL/L (ref 96–112)
CO2 SERPL-SCNC: 18 MMOL/L (ref 20–33)
CREAT SERPL-MCNC: 5.17 MG/DL (ref 0.5–1.4)
ERYTHROCYTE [DISTWIDTH] IN BLOOD BY AUTOMATED COUNT: 48.6 FL (ref 35.9–50)
EST. AVERAGE GLUCOSE BLD GHB EST-MCNC: 123 MG/DL
GFR SERPL CREATININE-BSD FRML MDRD: 8 ML/MIN/1.73 M 2
GLUCOSE BLD-MCNC: 270 MG/DL (ref 65–99)
GLUCOSE BLD-MCNC: 304 MG/DL (ref 65–99)
GLUCOSE BLD-MCNC: 323 MG/DL (ref 65–99)
GLUCOSE BLD-MCNC: 363 MG/DL (ref 65–99)
GLUCOSE SERPL-MCNC: 294 MG/DL (ref 65–99)
HBA1C MFR BLD: 5.9 % (ref 0–5.6)
HCT VFR BLD AUTO: 36.2 % (ref 37–47)
HGB BLD-MCNC: 11.7 G/DL (ref 12–16)
MCH RBC QN AUTO: 30.1 PG (ref 27–33)
MCHC RBC AUTO-ENTMCNC: 32.3 G/DL (ref 33.6–35)
MCV RBC AUTO: 93.1 FL (ref 81.4–97.8)
PLATELET # BLD AUTO: 184 K/UL (ref 164–446)
PMV BLD AUTO: 10.5 FL (ref 9–12.9)
POTASSIUM SERPL-SCNC: 4.6 MMOL/L (ref 3.6–5.5)
RBC # BLD AUTO: 3.89 M/UL (ref 4.2–5.4)
SODIUM SERPL-SCNC: 132 MMOL/L (ref 135–145)
TSH SERPL DL<=0.005 MIU/L-ACNC: 0.47 UIU/ML (ref 0.3–3.7)
WBC # BLD AUTO: 8.9 K/UL (ref 4.8–10.8)

## 2017-01-09 PROCEDURE — 700102 HCHG RX REV CODE 250 W/ 637 OVERRIDE(OP): Performed by: INTERNAL MEDICINE

## 2017-01-09 PROCEDURE — A9270 NON-COVERED ITEM OR SERVICE: HCPCS | Performed by: HOSPITALIST

## 2017-01-09 PROCEDURE — 82962 GLUCOSE BLOOD TEST: CPT | Mod: 91

## 2017-01-09 PROCEDURE — 700112 HCHG RX REV CODE 229: Performed by: HOSPITALIST

## 2017-01-09 PROCEDURE — 83036 HEMOGLOBIN GLYCOSYLATED A1C: CPT

## 2017-01-09 PROCEDURE — 700102 HCHG RX REV CODE 250 W/ 637 OVERRIDE(OP): Performed by: NEUROLOGICAL SURGERY

## 2017-01-09 PROCEDURE — A9270 NON-COVERED ITEM OR SERVICE: HCPCS | Performed by: INTERNAL MEDICINE

## 2017-01-09 PROCEDURE — 700111 HCHG RX REV CODE 636 W/ 250 OVERRIDE (IP): Performed by: INTERNAL MEDICINE

## 2017-01-09 PROCEDURE — A9270 NON-COVERED ITEM OR SERVICE: HCPCS | Performed by: NEUROLOGICAL SURGERY

## 2017-01-09 PROCEDURE — 770001 HCHG ROOM/CARE - MED/SURG/GYN PRIV*

## 2017-01-09 PROCEDURE — 36415 COLL VENOUS BLD VENIPUNCTURE: CPT

## 2017-01-09 PROCEDURE — 99232 SBSQ HOSP IP/OBS MODERATE 35: CPT | Performed by: INTERNAL MEDICINE

## 2017-01-09 PROCEDURE — 700102 HCHG RX REV CODE 250 W/ 637 OVERRIDE(OP)

## 2017-01-09 PROCEDURE — 84443 ASSAY THYROID STIM HORMONE: CPT

## 2017-01-09 PROCEDURE — 700105 HCHG RX REV CODE 258: Performed by: INTERNAL MEDICINE

## 2017-01-09 PROCEDURE — 700111 HCHG RX REV CODE 636 W/ 250 OVERRIDE (IP): Performed by: HOSPITALIST

## 2017-01-09 PROCEDURE — 80048 BASIC METABOLIC PNL TOTAL CA: CPT

## 2017-01-09 PROCEDURE — 85027 COMPLETE CBC AUTOMATED: CPT

## 2017-01-09 PROCEDURE — 700102 HCHG RX REV CODE 250 W/ 637 OVERRIDE(OP): Performed by: HOSPITALIST

## 2017-01-09 PROCEDURE — A9270 NON-COVERED ITEM OR SERVICE: HCPCS

## 2017-01-09 RX ORDER — INSULIN GLARGINE 100 [IU]/ML
15 INJECTION, SOLUTION SUBCUTANEOUS EVERY EVENING
Status: DISCONTINUED | OUTPATIENT
Start: 2017-01-09 | End: 2017-01-10

## 2017-01-09 RX ORDER — PIOGLITAZONEHYDROCHLORIDE 30 MG/1
30 TABLET ORAL DAILY
Status: DISCONTINUED | OUTPATIENT
Start: 2017-01-10 | End: 2017-01-09

## 2017-01-09 RX ORDER — SODIUM CHLORIDE 9 MG/ML
INJECTION, SOLUTION INTRAVENOUS CONTINUOUS
Status: DISPENSED | OUTPATIENT
Start: 2017-01-09 | End: 2017-01-09

## 2017-01-09 RX ADMIN — Medication 1 TABLET: at 21:54

## 2017-01-09 RX ADMIN — DEXAMETHASONE SODIUM PHOSPHATE 4 MG: 4 INJECTION, SOLUTION INTRAMUSCULAR; INTRAVENOUS at 05:29

## 2017-01-09 RX ADMIN — METOPROLOL TARTRATE 50 MG: 50 TABLET, FILM COATED ORAL at 21:53

## 2017-01-09 RX ADMIN — ONDANSETRON 4 MG: 4 TABLET, ORALLY DISINTEGRATING ORAL at 10:47

## 2017-01-09 RX ADMIN — HEPARIN SODIUM 5000 UNITS: 5000 INJECTION INTRAVENOUS; SUBCUTANEOUS at 21:55

## 2017-01-09 RX ADMIN — ACETAMINOPHEN 650 MG: 325 TABLET, FILM COATED ORAL at 22:12

## 2017-01-09 RX ADMIN — MORPHINE SULFATE 4 MG: 4 INJECTION INTRAVENOUS at 00:37

## 2017-01-09 RX ADMIN — HEPARIN SODIUM 5000 UNITS: 5000 INJECTION INTRAVENOUS; SUBCUTANEOUS at 05:29

## 2017-01-09 RX ADMIN — DILTIAZEM HYDROCHLORIDE 240 MG: 240 CAPSULE, COATED, EXTENDED RELEASE ORAL at 08:36

## 2017-01-09 RX ADMIN — PIOGLITAZONE 30 MG: 30 TABLET ORAL at 08:36

## 2017-01-09 RX ADMIN — LEVETIRACETAM 500 MG: 500 TABLET, FILM COATED ORAL at 08:36

## 2017-01-09 RX ADMIN — INSULIN LISPRO 10 UNITS: 100 INJECTION, SOLUTION INTRAVENOUS; SUBCUTANEOUS at 10:41

## 2017-01-09 RX ADMIN — INSULIN LISPRO 12 UNITS: 100 INJECTION, SOLUTION INTRAVENOUS; SUBCUTANEOUS at 18:05

## 2017-01-09 RX ADMIN — LEVETIRACETAM 500 MG: 500 TABLET, FILM COATED ORAL at 22:01

## 2017-01-09 RX ADMIN — METOPROLOL SUCCINATE 50 MG: 50 TABLET, EXTENDED RELEASE ORAL at 08:36

## 2017-01-09 RX ADMIN — SODIUM BICARBONATE TAB 650 MG 1300 MG: 650 TAB at 08:36

## 2017-01-09 RX ADMIN — DEXAMETHASONE SODIUM PHOSPHATE 4 MG: 4 INJECTION, SOLUTION INTRAMUSCULAR; INTRAVENOUS at 00:37

## 2017-01-09 RX ADMIN — INSULIN LISPRO 5 UNITS: 100 INJECTION, SOLUTION INTRAVENOUS; SUBCUTANEOUS at 05:31

## 2017-01-09 RX ADMIN — SODIUM CHLORIDE: 9 INJECTION, SOLUTION INTRAVENOUS at 15:12

## 2017-01-09 RX ADMIN — AMLODIPINE BESYLATE 5 MG: 5 TABLET ORAL at 08:36

## 2017-01-09 RX ADMIN — INSULIN GLARGINE 15 UNITS: 100 INJECTION, SOLUTION SUBCUTANEOUS at 21:57

## 2017-01-09 RX ADMIN — DEXAMETHASONE SODIUM PHOSPHATE 4 MG: 4 INJECTION, SOLUTION INTRAMUSCULAR; INTRAVENOUS at 18:11

## 2017-01-09 RX ADMIN — HEPARIN SODIUM 5000 UNITS: 5000 INJECTION INTRAVENOUS; SUBCUTANEOUS at 15:09

## 2017-01-09 RX ADMIN — ONDANSETRON 4 MG: 4 TABLET, ORALLY DISINTEGRATING ORAL at 22:12

## 2017-01-09 RX ADMIN — DEXAMETHASONE SODIUM PHOSPHATE 4 MG: 4 INJECTION, SOLUTION INTRAMUSCULAR; INTRAVENOUS at 23:49

## 2017-01-09 RX ADMIN — SIMVASTATIN 10 MG: 20 TABLET, FILM COATED ORAL at 21:52

## 2017-01-09 RX ADMIN — DEXAMETHASONE SODIUM PHOSPHATE 4 MG: 4 INJECTION, SOLUTION INTRAMUSCULAR; INTRAVENOUS at 12:00

## 2017-01-09 RX ADMIN — INSULIN LISPRO 10 UNITS: 100 INJECTION, SOLUTION INTRAVENOUS; SUBCUTANEOUS at 21:57

## 2017-01-09 RX ADMIN — DOCUSATE SODIUM 100 MG: 100 CAPSULE ORAL at 08:35

## 2017-01-09 ASSESSMENT — ENCOUNTER SYMPTOMS
PND: 0
VOMITING: 0
HEADACHES: 0
COUGH: 0
DOUBLE VISION: 0
BLURRED VISION: 0
DIZZINESS: 0
HEADACHES: 1
SHORTNESS OF BREATH: 0
CHILLS: 0
ABDOMINAL PAIN: 0
HEARTBURN: 0
FEVER: 0
NAUSEA: 1

## 2017-01-09 NOTE — PROGRESS NOTES
Monitor Summary: SR 60-67, SD .14, QRS .08, QT .40 with rare couplet per strip from monitor room.

## 2017-01-09 NOTE — PROGRESS NOTES
Progress Note               Author: Mango Schuler Date & Time created: 1/9/2017  9:00 AM     Interval History:  Pt stable  Headache and nausea improved      Review of Systems:  Review of Systems   Neurological: Positive for headaches.       Physical Exam:  Physical Exam   Constitutional: She is oriented to person, place, and time.   Neurological: She is alert and oriented to person, place, and time.   Alert to person and place only   Slight left generalized leg weakness  CN 2-12 grossly intact   No drift.        Labs:        Invalid input(s): [id number]  Recent Labs      01/06/17 2158 01/07/17   0355   TROPONINI  0.05*  0.07*   BNPBTYPENAT  446*   --      Recent Labs      01/07/17 1738 01/08/17 0308 01/09/17   0341   SODIUM  138  137  132*   POTASSIUM  4.2  4.4  4.6   CHLORIDE  108  108  105   CO2  18*  17*  18*   BUN  49*  54*  71*   CREATININE  4.19*  4.47*  5.17*   CALCIUM  8.9  8.5  7.7*     Recent Labs      01/06/17 2158 01/07/17 1738 01/08/17 0308 01/09/17   0341   ALTSGPT  13   --    --    --    ASTSGOT  10*   --    --    --    ALKPHOSPHAT  56   --    --    --    TBILIRUBIN  1.0   --    --    --    LIPASE  23   --    --    --    GLUCOSE  177*  225*  213*  294*     Recent Labs      01/06/17 2158 01/07/17 1738 01/08/17 0308 01/09/17   0341   RBC  4.06*  4.07*  3.89*  3.89*   HEMOGLOBIN  12.7  12.4  11.7*  11.7*   HEMATOCRIT  38.5  38.4  37.1  36.2*   PLATELETCT  214  198  200  184   PROTHROMBTM  14.5   --    --    --    APTT  26.6   --    --    --    INR  1.15*   --    --    --      Recent Labs      01/06/17 2158 01/07/17 1738 01/08/17 0308 01/09/17 0341   WBC  9.3  7.8  7.6  8.9   NEUTSPOLYS  83.70*  89.10*   --    --    LYMPHOCYTES  10.40*  8.10*   --    --    MONOCYTES  5.30  1.50   --    --    EOSINOPHILS  0.00  0.00   --    --    BASOPHILS  0.20  0.10   --    --    ASTSGOT  10*   --    --    --    ALTSGPT  13   --    --    --    ALKPHOSPHAT  56   --    --    --     TBILIRUBIN  1.0   --    --    --      Hemodynamics:  Temp (24hrs), Av.2 °C (97.2 °F), Min:36.1 °C (97 °F), Max:36.3 °C (97.4 °F)  Temperature: 36.2 °C (97.1 °F)  Pulse  Av.5  Min: 57  Max: 120   Blood Pressure : 139/65 mmHg     Respiratory:    Respiration: 16, Pulse Oximetry: 94 %     Work Of Breathing / Effort: Mild  RUL Breath Sounds: Clear, RML Breath Sounds: Clear, RLL Breath Sounds: Clear, SOURAV Breath Sounds: Clear, LLL Breath Sounds: Clear  Fluids:  No intake or output data in the 24 hours ending 17 0835     GI/Nutrition:  Orders Placed This Encounter   Procedures   • Diet Order     Standing Status: Standing      Number of Occurrences: 1      Standing Expiration Date:      Order Specific Question:  Diet:     Answer:  Diabetic [3]     Medical Decision Making, by Problem:  Active Hospital Problems    Diagnosis   • Intracranial mass [R90.0]       Plan:  Continue IM/Onc care  Dr. Ventura to review MRI and make surgical plan  Continue steroids and Keppra   Hold ASA   Will continue to follow     Labs reviewed, Radiology images reviewed and Medications reviewed

## 2017-01-09 NOTE — PROGRESS NOTES
[facility] Nephrology Progress Note               Author: Nikolay Pierce MD Date & Time created: 1/9/2017  11:09 AM     Interval History:  71 year old female with PMHx DM type 2, CKD who presented with headache and N/V and was found to have large right temporal intracranial mass on CT head ---> MRI brain reveals large right temporal lobe mass with extensive vasogenic edema most consistent with glioblastoma multiforme. There is R>L midline shift.    Consult to manage ANNABEL on CKD - patient has repeatedly said as outpatient - under no circumstances would she accept dialysis either temporarily or permanently.  HOWEVER - she has recanted that statement to me at this point.    DAILY NEPHROLOGY SUMMARY  1/08/17 - seen in consultation by Dr Chua.  ANNABEL felt likely due to pre-renal azotemia from poor intake w Nausea/headaches  1/09/17 - renal function not improved - no fluids given overnight - initiating x 1L        Review of Systems   Constitutional: Negative for fever and chills.   Eyes: Negative for blurred vision and double vision.   Respiratory: Negative for cough and shortness of breath.    Cardiovascular: Negative for chest pain and PND.   Gastrointestinal: Positive for nausea. Negative for heartburn, vomiting and abdominal pain.   Genitourinary: Negative for dysuria.   Neurological: Negative for dizziness and headaches.   All other systems reviewed and are negative.      Physical Exam   Constitutional: She is oriented to person, place, and time. She appears well-developed and well-nourished.   HENT:   Head: Normocephalic and atraumatic.   Eyes: Conjunctivae are normal.   Cardiovascular: Normal rate and regular rhythm.    Pulmonary/Chest: Effort normal and breath sounds normal. No respiratory distress. She has no wheezes. She has no rales.   Abdominal: Soft. Bowel sounds are normal. She exhibits no distension. There is no tenderness.   Musculoskeletal: Normal range of motion.   Neurological: She is alert and  oriented to person, place, and time. No cranial nerve deficit. Coordination normal.   Psychiatric: She has a normal mood and affect. Her behavior is normal.   Nursing note and vitals reviewed.      Labs:        Invalid input(s): [id number]  Recent Labs      17   0355   TROPONINI  0.05*  0.07*   BNPBTYPENAT  446*   --      Recent Labs      17   SODIUM  138  137  132*   POTASSIUM  4.2  4.4  4.6   CHLORIDE  108  108  105   CO2  18*  17*  18*   BUN  49*  54*  71*   CREATININE  4.19*  4.47*  5.17*   CALCIUM  8.9  8.5  7.7*     Recent Labs      17   ALTSGPT  13   --    --    --    ASTSGOT  10*   --    --    --    ALKPHOSPHAT  56   --    --    --    TBILIRUBIN  1.0   --    --    --    LIPASE  23   --    --    --    GLUCOSE  177*  225*  213*  294*     Recent Labs      17   RBC  4.06*  4.07*  3.89*  3.89*   HEMOGLOBIN  12.7  12.4  11.7*  11.7*   HEMATOCRIT  38.5  38.4  37.1  36.2*   PLATELETCT  214  198  200  184   PROTHROMBTM  14.5   --    --    --    APTT  26.6   --    --    --    INR  1.15*   --    --    --      Recent Labs      17   WBC  9.3  7.8  7.6  8.9   NEUTSPOLYS  83.70*  89.10*   --    --    LYMPHOCYTES  10.40*  8.10*   --    --    MONOCYTES  5.30  1.50   --    --    EOSINOPHILS  0.00  0.00   --    --    BASOPHILS  0.20  0.10   --    --    ASTSGOT  10*   --    --    --    ALTSGPT  13   --    --    --    ALKPHOSPHAT  56   --    --    --    TBILIRUBIN  1.0   --    --    --            Hemodynamics:  Temp (24hrs), Av.2 °C (97.2 °F), Min:36.1 °C (97 °F), Max:36.3 °C (97.4 °F)  Temperature: 36.2 °C (97.1 °F)  Pulse  Av.5  Min: 57  Max: 120   Blood Pressure : 139/65 mmHg     Respiratory:    Respiration: 16, Pulse Oximetry: 94 %     Work Of Breathing /  Effort: Mild  RUL Breath Sounds: Clear, RML Breath Sounds: Clear, RLL Breath Sounds: Clear, SOURAV Breath Sounds: Clear, LLL Breath Sounds: Clear  Fluids:  No intake or output data in the 24 hours ending 01/09/17 1109     GI/Nutrition:  Orders Placed This Encounter   Procedures   • Diet Order     Standing Status: Standing      Number of Occurrences: 1      Standing Expiration Date:      Order Specific Question:  Diet:     Answer:  Diabetic [3]     Medical Decision Making, by Problem:  Active Hospital Problems    Diagnosis       Assessment/Plan:  1. ANNABEL on CKD Stage III  --Cr 4.2 on admission now- SCreat now ~5.  eGFR decline from 12-->10  --baseline Stg III CKD at baseline. Elevated Cr likely related to pre-renal etiology given her poor PO intake  --patient now says that she would accept dialysis temporarily or permanently if needed  --Pt tolerating PO and has increased her PO fluid intake today  -- will add one L NS over next 12 hours and see how renal function does  --check urine electrolytes and renal ultrasound  --Avoid IV contrast/nephrotoxins/NSAIDs  --Pt also at risk for Nephrogenic Systemic Fibrosis with gadolinium, and if gadolinium is necessary for further imaging, pt will need to be on dialysis within 1hr of gadolinium exposure and will need 3 consecutive days of dialysis.    --Dose adjust meds for decreased GFR    2. Intracranial Mass  --MRI shows large heterogeneous partly hemorrhagic R temporal lobe mass with extensive vasogenic edema and midline shift consistent with glioblastoma multiforme  --On decadron  --Neurosurgery following  --Oncology Dr. Salomon also following    3. HTN  --sub-optimal control  --Continue home BP meds  --Start norvasc 5mg daily  --Avoid ACE-I/ARB for now    4. DM II--management per primary svc  5. Anemia--secondary to CKD, no need for TANISHA  6. Metabolic Acidosis--start PO bicarb supplements        Labs reviewed, Medications reviewed and Radiology images reviewed        DVT  Prophylaxis: Heparin

## 2017-01-09 NOTE — PROGRESS NOTES
Pt refusing bed alarm states she will call for assistance, explained to her what it was for , she stated she understood, will continue to follow

## 2017-01-09 NOTE — CARE PLAN
Problem: Pain Management  Goal: Pain level will decrease to patient’s comfort goal  Outcome: PROGRESSING AS EXPECTED  Patient medicated with morphine 4 mg IV as requested ( see MAR).  Observed in bed resting comfortable.    Problem: Safety  Goal: Will remain free from injury  Outcome: PROGRESSING AS EXPECTED  Safety and fall precautions in place.  Call light within reach.

## 2017-01-10 ENCOUNTER — APPOINTMENT (OUTPATIENT)
Dept: RADIOLOGY | Facility: MEDICAL CENTER | Age: 72
DRG: 025 | End: 2017-01-10
Attending: NEUROLOGICAL SURGERY
Payer: MEDICARE

## 2017-01-10 LAB
ALBUMIN SERPL BCP-MCNC: 3 G/DL (ref 3.2–4.9)
ALBUMIN/GLOB SERPL: 1.3 G/DL
ALP SERPL-CCNC: 78 U/L (ref 30–99)
ALT SERPL-CCNC: <5 U/L (ref 2–50)
ANION GAP SERPL CALC-SCNC: 9 MMOL/L (ref 0–11.9)
AST SERPL-CCNC: 5 U/L (ref 12–45)
BASOPHILS # BLD AUTO: 0.2 % (ref 0–1.8)
BASOPHILS # BLD: 0.02 K/UL (ref 0–0.12)
BILIRUB SERPL-MCNC: 0.3 MG/DL (ref 0.1–1.5)
BUN SERPL-MCNC: 83 MG/DL (ref 8–22)
CALCIUM SERPL-MCNC: 7.7 MG/DL (ref 8.5–10.5)
CHLORIDE SERPL-SCNC: 103 MMOL/L (ref 96–112)
CO2 SERPL-SCNC: 18 MMOL/L (ref 20–33)
CREAT SERPL-MCNC: 5.09 MG/DL (ref 0.5–1.4)
EOSINOPHIL # BLD AUTO: 0 K/UL (ref 0–0.51)
EOSINOPHIL NFR BLD: 0 % (ref 0–6.9)
ERYTHROCYTE [DISTWIDTH] IN BLOOD BY AUTOMATED COUNT: 47.9 FL (ref 35.9–50)
GFR SERPL CREATININE-BSD FRML MDRD: 8 ML/MIN/1.73 M 2
GLOBULIN SER CALC-MCNC: 2.4 G/DL (ref 1.9–3.5)
GLUCOSE BLD-MCNC: 199 MG/DL (ref 65–99)
GLUCOSE BLD-MCNC: 220 MG/DL (ref 65–99)
GLUCOSE BLD-MCNC: 224 MG/DL (ref 65–99)
GLUCOSE BLD-MCNC: 267 MG/DL (ref 65–99)
GLUCOSE SERPL-MCNC: 241 MG/DL (ref 65–99)
HCT VFR BLD AUTO: 40.2 % (ref 37–47)
HGB BLD-MCNC: 13.1 G/DL (ref 12–16)
IMM GRANULOCYTES # BLD AUTO: 0.15 K/UL (ref 0–0.11)
IMM GRANULOCYTES NFR BLD AUTO: 1.4 % (ref 0–0.9)
LYMPHOCYTES # BLD AUTO: 0.58 K/UL (ref 1–4.8)
LYMPHOCYTES NFR BLD: 5.6 % (ref 22–41)
MCH RBC QN AUTO: 30.3 PG (ref 27–33)
MCHC RBC AUTO-ENTMCNC: 32.6 G/DL (ref 33.6–35)
MCV RBC AUTO: 92.8 FL (ref 81.4–97.8)
MONOCYTES # BLD AUTO: 0.25 K/UL (ref 0–0.85)
MONOCYTES NFR BLD AUTO: 2.4 % (ref 0–13.4)
NEUTROPHILS # BLD AUTO: 9.43 K/UL (ref 2–7.15)
NEUTROPHILS NFR BLD: 90.4 % (ref 44–72)
NRBC # BLD AUTO: 0 K/UL
NRBC BLD AUTO-RTO: 0 /100 WBC
PLATELET # BLD AUTO: 200 K/UL (ref 164–446)
PMV BLD AUTO: 10.6 FL (ref 9–12.9)
POTASSIUM SERPL-SCNC: 5.2 MMOL/L (ref 3.6–5.5)
PROT SERPL-MCNC: 5.4 G/DL (ref 6–8.2)
RBC # BLD AUTO: 4.33 M/UL (ref 4.2–5.4)
SODIUM SERPL-SCNC: 130 MMOL/L (ref 135–145)
WBC # BLD AUTO: 10.4 K/UL (ref 4.8–10.8)

## 2017-01-10 PROCEDURE — 700102 HCHG RX REV CODE 250 W/ 637 OVERRIDE(OP): Performed by: NEUROLOGICAL SURGERY

## 2017-01-10 PROCEDURE — A9270 NON-COVERED ITEM OR SERVICE: HCPCS | Performed by: INTERNAL MEDICINE

## 2017-01-10 PROCEDURE — 700102 HCHG RX REV CODE 250 W/ 637 OVERRIDE(OP): Performed by: INTERNAL MEDICINE

## 2017-01-10 PROCEDURE — A9270 NON-COVERED ITEM OR SERVICE: HCPCS

## 2017-01-10 PROCEDURE — 700112 HCHG RX REV CODE 229: Performed by: HOSPITALIST

## 2017-01-10 PROCEDURE — A9270 NON-COVERED ITEM OR SERVICE: HCPCS | Performed by: NEUROLOGICAL SURGERY

## 2017-01-10 PROCEDURE — 36415 COLL VENOUS BLD VENIPUNCTURE: CPT

## 2017-01-10 PROCEDURE — 700102 HCHG RX REV CODE 250 W/ 637 OVERRIDE(OP): Performed by: HOSPITALIST

## 2017-01-10 PROCEDURE — 80053 COMPREHEN METABOLIC PANEL: CPT

## 2017-01-10 PROCEDURE — 85025 COMPLETE CBC W/AUTO DIFF WBC: CPT

## 2017-01-10 PROCEDURE — 700102 HCHG RX REV CODE 250 W/ 637 OVERRIDE(OP)

## 2017-01-10 PROCEDURE — A9270 NON-COVERED ITEM OR SERVICE: HCPCS | Performed by: HOSPITALIST

## 2017-01-10 PROCEDURE — 700111 HCHG RX REV CODE 636 W/ 250 OVERRIDE (IP): Performed by: INTERNAL MEDICINE

## 2017-01-10 PROCEDURE — 99233 SBSQ HOSP IP/OBS HIGH 50: CPT | Performed by: HOSPITALIST

## 2017-01-10 PROCEDURE — 70551 MRI BRAIN STEM W/O DYE: CPT

## 2017-01-10 PROCEDURE — 770001 HCHG ROOM/CARE - MED/SURG/GYN PRIV*

## 2017-01-10 PROCEDURE — 700111 HCHG RX REV CODE 636 W/ 250 OVERRIDE (IP): Performed by: HOSPITALIST

## 2017-01-10 PROCEDURE — 82962 GLUCOSE BLOOD TEST: CPT

## 2017-01-10 RX ORDER — SODIUM CHLORIDE 1 G/1
1 TABLET ORAL
Status: DISCONTINUED | OUTPATIENT
Start: 2017-01-10 | End: 2017-01-12

## 2017-01-10 RX ORDER — PIOGLITAZONEHYDROCHLORIDE 30 MG/1
30 TABLET ORAL DAILY
Status: DISCONTINUED | OUTPATIENT
Start: 2017-01-10 | End: 2017-01-16

## 2017-01-10 RX ORDER — INSULIN GLARGINE 100 [IU]/ML
20 INJECTION, SOLUTION SUBCUTANEOUS EVERY EVENING
Status: DISCONTINUED | OUTPATIENT
Start: 2017-01-10 | End: 2017-01-27 | Stop reason: HOSPADM

## 2017-01-10 RX ADMIN — HEPARIN SODIUM 5000 UNITS: 5000 INJECTION INTRAVENOUS; SUBCUTANEOUS at 21:55

## 2017-01-10 RX ADMIN — METOPROLOL TARTRATE 50 MG: 50 TABLET, FILM COATED ORAL at 09:14

## 2017-01-10 RX ADMIN — DEXAMETHASONE SODIUM PHOSPHATE 4 MG: 4 INJECTION, SOLUTION INTRAMUSCULAR; INTRAVENOUS at 06:10

## 2017-01-10 RX ADMIN — INSULIN LISPRO 3 UNITS: 100 INJECTION, SOLUTION INTRAVENOUS; SUBCUTANEOUS at 21:59

## 2017-01-10 RX ADMIN — METOPROLOL TARTRATE 50 MG: 50 TABLET, FILM COATED ORAL at 21:55

## 2017-01-10 RX ADMIN — DILTIAZEM HYDROCHLORIDE 240 MG: 240 CAPSULE, COATED, EXTENDED RELEASE ORAL at 09:14

## 2017-01-10 RX ADMIN — PIOGLITAZONE 30 MG: 30 TABLET ORAL at 11:35

## 2017-01-10 RX ADMIN — ONDANSETRON 4 MG: 4 TABLET, ORALLY DISINTEGRATING ORAL at 21:55

## 2017-01-10 RX ADMIN — DEXAMETHASONE SODIUM PHOSPHATE 4 MG: 4 INJECTION, SOLUTION INTRAMUSCULAR; INTRAVENOUS at 11:35

## 2017-01-10 RX ADMIN — LEVETIRACETAM 500 MG: 500 TABLET, FILM COATED ORAL at 09:13

## 2017-01-10 RX ADMIN — Medication 1 TABLET: at 21:56

## 2017-01-10 RX ADMIN — SODIUM BICARBONATE TAB 650 MG 1300 MG: 650 TAB at 09:13

## 2017-01-10 RX ADMIN — LEVETIRACETAM 500 MG: 500 TABLET, FILM COATED ORAL at 21:55

## 2017-01-10 RX ADMIN — AMLODIPINE BESYLATE 5 MG: 5 TABLET ORAL at 09:14

## 2017-01-10 RX ADMIN — INSULIN GLARGINE 20 UNITS: 100 INJECTION, SOLUTION SUBCUTANEOUS at 21:58

## 2017-01-10 RX ADMIN — SODIUM CHLORIDE TAB 1 GM 1 G: 1 TAB at 17:54

## 2017-01-10 RX ADMIN — INSULIN LISPRO 4 UNITS: 100 INJECTION, SOLUTION INTRAVENOUS; SUBCUTANEOUS at 06:12

## 2017-01-10 RX ADMIN — ACETAMINOPHEN 650 MG: 325 TABLET, FILM COATED ORAL at 21:55

## 2017-01-10 RX ADMIN — SIMVASTATIN 10 MG: 20 TABLET, FILM COATED ORAL at 21:56

## 2017-01-10 RX ADMIN — INSULIN LISPRO 7 UNITS: 100 INJECTION, SOLUTION INTRAVENOUS; SUBCUTANEOUS at 11:51

## 2017-01-10 RX ADMIN — DOCUSATE SODIUM 100 MG: 100 CAPSULE ORAL at 09:13

## 2017-01-10 RX ADMIN — DEXAMETHASONE SODIUM PHOSPHATE 4 MG: 4 INJECTION, SOLUTION INTRAMUSCULAR; INTRAVENOUS at 17:53

## 2017-01-10 RX ADMIN — HEPARIN SODIUM 5000 UNITS: 5000 INJECTION INTRAVENOUS; SUBCUTANEOUS at 06:10

## 2017-01-10 RX ADMIN — INSULIN LISPRO 4 UNITS: 100 INJECTION, SOLUTION INTRAVENOUS; SUBCUTANEOUS at 17:54

## 2017-01-10 ASSESSMENT — ENCOUNTER SYMPTOMS
PALPITATIONS: 0
TINGLING: 0
HEADACHES: 1
BLURRED VISION: 0
CHILLS: 0
PND: 0
INSOMNIA: 0
BACK PAIN: 0
COUGH: 0
FEVER: 0
VOMITING: 0
DIZZINESS: 0
SHORTNESS OF BREATH: 0
DOUBLE VISION: 0
ABDOMINAL PAIN: 0
HEARTBURN: 0
NECK PAIN: 0
NAUSEA: 1
DEPRESSION: 0
SORE THROAT: 0
EYE PAIN: 0

## 2017-01-10 ASSESSMENT — PAIN SCALES - GENERAL: PAINLEVEL_OUTOF10: 4

## 2017-01-10 NOTE — PROGRESS NOTES
[facility]    Name Yina Stephens       1945   Age/Sex 71 y.o. female   MRN [id number]       ID: 71 year old female with PMHx DM type 2, CKD who presented with headache and N/V and was found to have large right temporal intracranial mass on CT head ---> MRI brain reveals large right temporal lobe mass with extensive vasogenic edema most consistent with glioblastoma multiforme. There is R>L midline shift.    Consult to manage ANNABEL on CKD - patient has repeatedly said as outpatient - under no circumstances would she accept dialysis either temporarily or permanently.  HOWEVER - she has recanted that statement to me at this point.    DAILY NEPHROLOGY SUMMARY  17 - seen in consultation by Dr Chua.  ANNABEL felt likely due to pre-renal azotemia from poor intake w Nausea/headaches  17 - renal function not improved - no fluids given overnight - initiating x 1L  1/10/17 - Cr dropped from 5.17 to 5.09. N/V improved as well as headache. K: 5.2. Nearly euvolemic. Better appetite. No indication to start HD now.     Interval Problem Daily Status Update    Active Hospital Problems    Diagnosis   • Type 2 diabetes mellitus with stage 3 chronic kidney disease (HCC) [E11.22, N18.3]   • Intracranial mass [R90.0]   • Acute on chronic renal insufficiency (HCC) [N28.9, N18.9]   • HTN (hypertension) [I10]       Past Medical History   Diagnosis Date   • Hiatus hernia syndrome    • Pain    • Renal disorder    • CATARACT        ROS   Constitutional: Denies fevers. Mild headache, improving  Eyes: Denies changes in vision, no eye pain  Ears/Nose/Throat/Mouth: Denies nasal congestion or sore throat   Cardiovascular: Denies chest pain or palpitations   Respiratory: Denies shortness of breath , Denies cough  Gastrointestinal/Hepatic: Denies abdominal pain. N/V improved  Genitourinary: Denies bladder dysfunction, dysuria or frequency  Musculoskeletal/Rheum: No complaints   Skin/Breast:  Denies rash   Psychiatric: Denies mood disorder   All other systems were reviewed and are negative (AMA/CMS criteria)    Quality Measures  Core Measures  Per Primary team.   On heparin Ppx.   No anti.     Physical Exam       Filed Vitals:    01/09/17 2100 01/10/17 0000 01/10/17 0400 01/10/17 0842   BP: 140/49 137/55 139/62 143/72   Pulse: 59 60 58 60   Temp: 36.3 °C (97.3 °F) 36.4 °C (97.6 °F) 36.2 °C (97.1 °F) 36.3 °C (97.4 °F)   Resp: 16 16 16 16   Height:       Weight:       SpO2: 91% 96% 95% 97%     Body mass index is 27.17 kg/(m^2).    Oxygen Therapy:  Pulse Oximetry: 97 %, O2 (LPM): 2, O2 Delivery: Nasal Cannula    Physical Exam  HEENT: grossly normal   Cardiovascular: Normal heart rate, Normal rhythm   Lungs: Respiratory effort is normal on low dose N/C O2. Normal breath sounds  Abdomen: Bowel sounds normal, Soft, No tenderness  Skin: No erythema, No rash  Lower limbs: normal, no pitting edema   Neurologic: Alert & oriented x 3, Normal motor function, Normal sensory function, No focal deficits noted, cranial nerves II through XII are normal  PSY: stable mood.         Lab Data Review:      1/10/2017  1:46 PM    Recent Labs      01/08/17   0308  01/09/17   0341  01/10/17   0343   SODIUM  137  132*  130*   POTASSIUM  4.4  4.6  5.2   CHLORIDE  108  105  103   CO2  17*  18*  18*   BUN  54*  71*  83*   CREATININE  4.47*  5.17*  5.09*   CALCIUM  8.5  7.7*  7.7*       Recent Labs      01/08/17   0308  01/09/17   0341  01/10/17   0343   ALTSGPT   --    --   <5   ASTSGOT   --    --   5*   ALKPHOSPHAT   --    --   78   TBILIRUBIN   --    --   0.3   GLUCOSE  213*  294*  241*       Recent Labs      01/08/17   0308  01/09/17   0341  01/10/17   0343   RBC  3.89*  3.89*  4.33   HEMOGLOBIN  11.7*  11.7*  13.1   HEMATOCRIT  37.1  36.2*  40.2   PLATELETCT  200  184  200       Recent Labs      01/07/17   1738  01/08/17   0308  01/09/17   0341  01/10/17   0343   WBC  7.8  7.6  8.9  10.4   NEUTSPOLYS  89.10*   --    --   90.40*    LYMPHOCYTES  8.10*   --    --   5.60*   MONOCYTES  1.50   --    --   2.40   EOSINOPHILS  0.00   --    --   0.00   BASOPHILS  0.10   --    --   0.20   ASTSGOT   --    --    --   5*   ALTSGPT   --    --    --   <5   ALKPHOSPHAT   --    --    --   78   TBILIRUBIN   --    --    --   0.3           Assessment/Plan   1. ANNABEL on CKD (her baseline Cr in last 6month was 3.7-4)  - Cr 5.1-5.2. No over change compared to yesterday after 1L NS. Willing to have HD if indicated. Urine lyte still pending. Bun/Cr 83/5.   - I/O + 300 yesterday. Stable BP around 140 systolically. HR around 60  - No recent cardiac echo, last A1C: 5.9 (Jan 2017)  - [place] ultrasound: only one kidney, right kid: 11.75cm, no hydro.   - Protein/Cr >6000, no blood in crystal in UA.   - iron Sat 38, ferrutin 235, not on ferrous. Hgb 13.   - K: 5.2, HCO3: 18. On sodium bicarb. Corrected Ca:8.5, PO4:3.7, . Not on PO4 binder now.   --> no indication for HD today, but might need HD during/after surgery (brain tumor), will arrange 3-lumen HD catheter on Thur or Fri (discussed this catheter issue with primary team face to face around 1pm).   --> Avoid nephro-toxic agents or contrast. If MR with contrast is needed before surgery, please let Nephro team know beforehand.   --> Keep current strategy of fluid status management.     2. Brain tumor/mass  - On Decadron 4mg Q6, symptoms including Headache and N/V improved.   -->follow Onc, and NeuroSurgry's recs.     3. HTN, chronic  - Around 130-140 systolically. On diltiazem, metoprolol now. No ACEI/ARB for now.     4. DM II, with A1C 5.9. Continue current management.   5. Mild Anemia, might be related to CKD, current Hgb at 13 with good Sat at 38. No intervention for now.

## 2017-01-10 NOTE — PROGRESS NOTES
Hospital Medicine Progress Note, Adult, Complex               Author: Tor pro Date & Time created: 1/10/2017  9:47 AM     71 year old female with CKD presented with headache and N/V and was found to have large right temporal intracranial mass, GBM. NSx, Onc and Nephrology following.       Interval History:  Feeling well. We discussed dnr in detail today. Discussed upcomming plan today.     Review of Systems:  Review of Systems   Constitutional: Negative for fever and chills.   HENT: Negative for sore throat.    Eyes: Negative for blurred vision, double vision and pain.   Respiratory: Negative for cough and shortness of breath.    Cardiovascular: Negative for chest pain, palpitations and PND.   Gastrointestinal: Positive for nausea. Negative for heartburn, vomiting and abdominal pain.   Genitourinary: Negative for dysuria and urgency.   Musculoskeletal: Negative for back pain and neck pain.   Skin: Negative for itching and rash.   Neurological: Positive for headaches. Negative for dizziness and tingling.   Psychiatric/Behavioral: Negative for depression. The patient does not have insomnia.    All other systems reviewed and are negative.      Physical Exam:  Physical Exam   Constitutional: She is oriented to person, place, and time. She appears well-developed and well-nourished. No distress.   HENT:   Head: Normocephalic and atraumatic.   Right Ear: External ear normal.   Left Ear: External ear normal.   Nose: Nose normal.   Eyes: Conjunctivae are normal. Right eye exhibits no discharge. Left eye exhibits no discharge.   Neck: No JVD present.   Cardiovascular: Normal rate, regular rhythm and normal heart sounds.    No murmur heard.  Pulmonary/Chest: Effort normal and breath sounds normal. No stridor. No respiratory distress. She has no wheezes. She has no rales.   Abdominal: Soft. Bowel sounds are normal. She exhibits no distension. There is no tenderness.   Musculoskeletal: Normal range of motion. She  exhibits no edema or tenderness.   Neurological: She is alert and oriented to person, place, and time. No cranial nerve deficit. Coordination normal.   Skin: Skin is warm and dry. She is not diaphoretic. No erythema.   Psychiatric: She has a normal mood and affect. Her behavior is normal.   Nursing note and vitals reviewed.      Labs:        Invalid input(s): [id number]      Recent Labs      01/08/17   0308  01/09/17   0341  01/10/17   0343   SODIUM  137  132*  130*   POTASSIUM  4.4  4.6  5.2   CHLORIDE  108  105  103   CO2  17*  18*  18*   BUN  54*  71*  83*   CREATININE  4.47*  5.17*  5.09*   CALCIUM  8.5  7.7*  7.7*     Recent Labs      01/08/17   0308  01/09/17   0341  01/10/17   0343   ALTSGPT   --    --   <5   ASTSGOT   --    --   5*   ALKPHOSPHAT   --    --   78   TBILIRUBIN   --    --   0.3   GLUCOSE  213*  294*  241*     Recent Labs      01/08/17   0308  01/09/17   0341  01/10/17   0343   RBC  3.89*  3.89*  4.33   HEMOGLOBIN  11.7*  11.7*  13.1   HEMATOCRIT  37.1  36.2*  40.2   PLATELETCT  200  184  200     Recent Labs      17   1738  01/08/17   0308  01/09/17   0341  01/10/17   0343   WBC  7.8  7.6  8.9  10.4   NEUTSPOLYS  89.10*   --    --   90.40*   LYMPHOCYTES  8.10*   --    --   5.60*   MONOCYTES  1.50   --    --   2.40   EOSINOPHILS  0.00   --    --   0.00   BASOPHILS  0.10   --    --   0.20   ASTSGOT   --    --    --   5*   ALTSGPT   --    --    --   <5   ALKPHOSPHAT   --    --    --   78   TBILIRUBIN   --    --    --   0.3           Hemodynamics:  Temp (24hrs), Av.3 °C (97.3 °F), Min:36.2 °C (97.1 °F), Max:36.4 °C (97.6 °F)  Temperature: 36.3 °C (97.4 °F)  Pulse  Av.4  Min: 57  Max: 120   Blood Pressure : 143/72 mmHg     Respiratory:    Respiration: 16, Pulse Oximetry: 97 %     Work Of Breathing / Effort: Mild  RUL Breath Sounds: Clear, RML Breath Sounds: Clear, RLL Breath Sounds: Clear, SOURAV Breath Sounds: Clear, LLL Breath Sounds: Clear  Fluids:    Intake/Output Summary (Last 24  hours) at 01/10/17 0947  Last data filed at 01/09/17 2100   Gross per 24 hour   Intake    300 ml   Output      0 ml   Net    300 ml        GI/Nutrition:  Orders Placed This Encounter   Procedures   • Diet Order     Standing Status: Standing      Number of Occurrences: 1      Standing Expiration Date:      Order Specific Question:  Diet:     Answer:  Diabetic [3]     Medical Decision Making, by Problem:  Active Hospital Problems    Diagnosis   • • Intracranial mass [R90.0]   -Likely Glioblastoma per MRI  -plan for surgical resection, Neurosurgery on board  -aspirin held  -continue Steroids  -Continue Keppra  -Ok for DVT prophylaxis, will hold dose prior to day of surgery    • Acute on chronic renal insufficiency (HCC) [N28.9, N18.9]   -worsening   -IVF bolus  -increase PO intake  -Avoid IV contrast/nephrotoxins/NSAIDs  -Dose adjust meds for decreased GFR  -continue to monitor BMP    Type 2 diabetes mellitus with stage 3 chronic kidney disease (HCC) [E11.22, N18.3]  -uncontrolled likely due to steroids  -Start Lantus 15 U  -check HbA1c  -DC Actos for worsening kidney function  -continue ISS with serial accu-checks  -monitor BMP           • HTN (hypertension) [I10]  -well controlled  -continue toprol xl, cardizem and amlodipine  -Labetalol PRN for SBP > 160     Increase lantus- on decadron  Actos is fine to restart.   Getting weaker and confused  Salt tabs  Hd cath per ir    Dicussed plan with RN  dispo- likely snf  Dnr?- she will discuss with family and we will discuss again.       Labs reviewed, Medications reviewed and Radiology images reviewed        DVT Prophylaxis: Heparin

## 2017-01-10 NOTE — CARE PLAN
Problem: Knowledge Deficit  Goal: Knowledge of disease process/condition, treatment plan, diagnostic tests, and medications will improve  Intervention: Assess knowledge level of disease process/condition, treatment plan, diagnostic tests, and medications  Reviewing plan of care, activities, and medication with patient.  Encouraging patient to ask questions and participate in plan of care.  Providing answers to all questions.  Continuing with current plan of care.  Hourly rounding in practice.      Problem: Fluid Volume:  Goal: Will maintain balanced intake and output  Intervention: Monitor, educate, and encourage compliance with therapeutic intake of liquids  Monitoring pt fluid intake. Encouraging pt to drink fluids on own, ice water at bedside. Pt Providing education about nutrition and hydration, will continue to reinforce education. Continuing with plan of care.

## 2017-01-10 NOTE — PROGRESS NOTES
Patient resting in bed, A&Ox4.  PERRLA. Complaining of mild h/a and nausea, RN gave tylenol and Zofran.  Respirations are even and unlabored with clear breath sounds on 2 L, abdomen is soft, nontender with positive bowel sounds. Skin is warm and dry with no breakdown and no edema noted. Bed alarm on, call light within reach, brother at bedside for support, will continue to monitor.

## 2017-01-10 NOTE — PROGRESS NOTES
Spoke with Shavon to be NPO 1/12/17 ( for 6 hours) and Heparin held for Temp Dialysis Cath Placement. [email or web address]

## 2017-01-10 NOTE — PROGRESS NOTES
Hospital Medicine Progress Note, Adult, Complex               Author: Rafael Kaufman Date & Time created: 1/9/2017  6:56 PM     Interval History:  71 year old female with PMHx DM type 2, CKD who presented with headache and N/V and was found to have large right temporal intracranial mass on CT head    MRI brain reveals large right temporal lobe mass with extensive vasogenic edema most consistent with glioblastoma multiforme.    from oncology feels this is likely a primary brain tumor, however if necessary, an outpatient PET CT can be done for metastatic workup. He would like to follow up with the patient as outpatient after her surgery.    Kidney function continues to decline. Will get NS bolus.     Headache controlled with morphine    Review of Systems:  Review of Systems   Constitutional: Negative for fever and chills.   Eyes: Negative for blurred vision and double vision.   Respiratory: Negative for cough and shortness of breath.    Cardiovascular: Negative for chest pain and PND.   Gastrointestinal: Positive for nausea. Negative for heartburn, vomiting and abdominal pain.   Genitourinary: Negative for dysuria.   Neurological: Positive for headaches. Negative for dizziness.   All other systems reviewed and are negative.      Physical Exam:  Physical Exam   Constitutional: She is oriented to person, place, and time. She appears well-developed and well-nourished.   HENT:   Head: Normocephalic and atraumatic.   Eyes: Conjunctivae are normal.   Cardiovascular: Normal rate and regular rhythm.    Pulmonary/Chest: Effort normal and breath sounds normal. No respiratory distress. She has no wheezes. She has no rales.   Abdominal: Soft. Bowel sounds are normal. She exhibits no distension. There is no tenderness.   Musculoskeletal: Normal range of motion.   Neurological: She is alert and oriented to person, place, and time. No cranial nerve deficit. Coordination normal.   Psychiatric: She has a normal mood and affect.  Her behavior is normal.   Nursing note and vitals reviewed.      Labs:        Invalid input(s): [id number]  Recent Labs      17   0355   TROPONINI  0.05*  0.07*   BNPBTYPENAT  446*   --      Recent Labs      17   0341   SODIUM  138  137  132*   POTASSIUM  4.2  4.4  4.6   CHLORIDE  108  108  105   CO2  18*  17*  18*   BUN  49*  54*  71*   CREATININE  4.19*  4.47*  5.17*   CALCIUM  8.9  8.5  7.7*     Recent Labs      17   ALTSGPT  13   --    --    --    ASTSGOT  10*   --    --    --    ALKPHOSPHAT  56   --    --    --    TBILIRUBIN  1.0   --    --    --    LIPASE  23   --    --    --    GLUCOSE  177*  225*  213*  294*     Recent Labs      17   034   RBC  4.06*  4.07*  3.89*  3.89*   HEMOGLOBIN  12.7  12.4  11.7*  11.7*   HEMATOCRIT  38.5  38.4  37.1  36.2*   PLATELETCT  214  198  200  184   PROTHROMBTM  14.5   --    --    --    APTT  26.6   --    --    --    INR  1.15*   --    --    --      Recent Labs      17   WBC  9.3  7.8  7.6  8.9   NEUTSPOLYS  83.70*  89.10*   --    --    LYMPHOCYTES  10.40*  8.10*   --    --    MONOCYTES  5.30  1.50   --    --    EOSINOPHILS  0.00  0.00   --    --    BASOPHILS  0.20  0.10   --    --    ASTSGOT  10*   --    --    --    ALTSGPT  13   --    --    --    ALKPHOSPHAT  56   --    --    --    TBILIRUBIN  1.0   --    --    --            Hemodynamics:  Temp (24hrs), Av.3 °C (97.3 °F), Min:36.2 °C (97.1 °F), Max:36.3 °C (97.4 °F)  Temperature: 36.2 °C (97.2 °F)  Pulse  Av.8  Min: 57  Max: 120   Blood Pressure : 134/46 mmHg     Respiratory:    Respiration: 16, Pulse Oximetry: 96 %     Work Of Breathing / Effort: Mild  RUL Breath Sounds: Clear, RML Breath Sounds: Clear, RLL Breath Sounds: Clear, SOURAV Breath Sounds: Clear, LLL Breath  Sounds: Clear  Fluids:  No intake or output data in the 24 hours ending 01/09/17 1856     GI/Nutrition:  Orders Placed This Encounter   Procedures   • Diet Order     Standing Status: Standing      Number of Occurrences: 1      Standing Expiration Date:      Order Specific Question:  Diet:     Answer:  Diabetic [3]     Medical Decision Making, by Problem:  Active Hospital Problems    Diagnosis   • • Intracranial mass [R90.0]   -Likely Glioblastoma per MRI  -plan for surgical resection, Neurosurgery on board  -aspirin held  -continue Steroids  -Continue Keppra  -Ok for DVT prophylaxis, will hold dose prior to day of surgery    • Acute on chronic renal insufficiency (HCC) [N28.9, N18.9]   -worsening   -IVF bolus  -increase PO intake  -Avoid IV contrast/nephrotoxins/NSAIDs  -Dose adjust meds for decreased GFR  -continue to monitor BMP    Type 2 diabetes mellitus with stage 3 chronic kidney disease (HCC) [E11.22, N18.3]  -uncontrolled likely due to steroids  -Start Lantus 15 U  -check HbA1c  -DC Actos for worsening kidney function  -continue ISS with serial accu-checks  -monitor BMP           • HTN (hypertension) [I10]  -well controlled  -continue toprol xl, cardizem and amlodipine  -Labetalol PRN for SBP > 160         Labs reviewed, Medications reviewed and Radiology images reviewed        DVT Prophylaxis: Heparin

## 2017-01-10 NOTE — PROGRESS NOTES
Progress Note               Author: Mango GIGI Schuler Date & Time created: 1/10/2017  7:40 AM     Interval History:  Pt stable  Headache and nausea improved      Review of Systems:  Review of Systems   Neurological: Positive for headaches.       Physical Exam:  Physical Exam   Constitutional: She is oriented to person, place, and time.   Neurological: She is alert and oriented to person, place, and time.   Alert to person and place only   Slight left generalized leg weakness  CN 2-12 grossly intact   No drift.        Labs:        Invalid input(s): [id number]      Recent Labs      01/08/17   0308  01/09/17   0341  01/10/17   0343   SODIUM  137  132*  130*   POTASSIUM  4.4  4.6  5.2   CHLORIDE  108  105  103   CO2  17*  18*  18*   BUN  54*  71*  83*   CREATININE  4.47*  5.17*  5.09*   CALCIUM  8.5  7.7*  7.7*     Recent Labs      01/08/17   0308  01/09/17   0341  01/10/17   0343   ALTSGPT   --    --   <5   ASTSGOT   --    --   5*   ALKPHOSPHAT   --    --   78   TBILIRUBIN   --    --   0.3   GLUCOSE  213*  294*  241*     Recent Labs      01/08/17   0308  01/09/17   0341  01/10/17   0343   RBC  3.89*  3.89*  4.33   HEMOGLOBIN  11.7*  11.7*  13.1   HEMATOCRIT  37.1  36.2*  40.2   PLATELETCT  200  184  200     Recent Labs      17   1738  01/08/17   0308  01/09/17   0341  01/10/17   0343   WBC  7.8  7.6  8.9  10.4   NEUTSPOLYS  89.10*   --    --   90.40*   LYMPHOCYTES  8.10*   --    --   5.60*   MONOCYTES  1.50   --    --   2.40   EOSINOPHILS  0.00   --    --   0.00   BASOPHILS  0.10   --    --   0.20   ASTSGOT   --    --    --   5*   ALTSGPT   --    --    --   <5   ALKPHOSPHAT   --    --    --   78   TBILIRUBIN   --    --    --   0.3     Hemodynamics:  Temp (24hrs), Av.3 °C (97.3 °F), Min:36.2 °C (97.1 °F), Max:36.4 °C (97.6 °F)  Temperature: 36.2 °C (97.1 °F)  Pulse  Av.9  Min: 57  Max: 120   Blood Pressure : 139/62 mmHg     Respiratory:    Respiration: 16, Pulse Oximetry: 95 %     Work Of Breathing /  Effort: Mild  RUL Breath Sounds: Clear, RML Breath Sounds: Clear, RLL Breath Sounds: Clear, SOURAV Breath Sounds: Clear, LLL Breath Sounds: Clear  Fluids:  No intake or output data in the 24 hours ending 01/08/17 0835     GI/Nutrition:  Orders Placed This Encounter   Procedures   • Diet Order     Standing Status: Standing      Number of Occurrences: 1      Standing Expiration Date:      Order Specific Question:  Diet:     Answer:  Diabetic [3]     Medical Decision Making, by Problem:  Active Hospital Problems    Diagnosis   • Intracranial mass [R90.0]       Plan:  Continue IM/Onc care  Continue steroids and Keppra   Hold ASA   Plan on surgery on Sunday with Dr. Ventura.     Labs reviewed, Radiology images reviewed and Medications reviewed

## 2017-01-11 ENCOUNTER — APPOINTMENT (OUTPATIENT)
Dept: RADIOLOGY | Facility: MEDICAL CENTER | Age: 72
DRG: 025 | End: 2017-01-11
Attending: INTERNAL MEDICINE
Payer: MEDICARE

## 2017-01-11 LAB
ALBUMIN SERPL BCP-MCNC: 2.5 G/DL (ref 3.2–4.9)
ALBUMIN/GLOB SERPL: 1.1 G/DL
ALP SERPL-CCNC: 58 U/L (ref 30–99)
ALT SERPL-CCNC: <5 U/L (ref 2–50)
ANION GAP SERPL CALC-SCNC: 11 MMOL/L (ref 0–11.9)
AST SERPL-CCNC: 5 U/L (ref 12–45)
BASOPHILS # BLD AUTO: 0 % (ref 0–1.8)
BASOPHILS # BLD: 0 K/UL (ref 0–0.12)
BILIRUB SERPL-MCNC: 0.3 MG/DL (ref 0.1–1.5)
BUN SERPL-MCNC: 94 MG/DL (ref 8–22)
CALCIUM SERPL-MCNC: 7.5 MG/DL (ref 8.5–10.5)
CHLORIDE SERPL-SCNC: 104 MMOL/L (ref 96–112)
CO2 SERPL-SCNC: 15 MMOL/L (ref 20–33)
CREAT SERPL-MCNC: 5.93 MG/DL (ref 0.5–1.4)
EOSINOPHIL # BLD AUTO: 0 K/UL (ref 0–0.51)
EOSINOPHIL NFR BLD: 0 % (ref 0–6.9)
ERYTHROCYTE [DISTWIDTH] IN BLOOD BY AUTOMATED COUNT: 50.6 FL (ref 35.9–50)
GFR SERPL CREATININE-BSD FRML MDRD: 7 ML/MIN/1.73 M 2
GLOBULIN SER CALC-MCNC: 2.3 G/DL (ref 1.9–3.5)
GLUCOSE BLD-MCNC: 198 MG/DL (ref 65–99)
GLUCOSE BLD-MCNC: 207 MG/DL (ref 65–99)
GLUCOSE BLD-MCNC: 227 MG/DL (ref 65–99)
GLUCOSE SERPL-MCNC: 230 MG/DL (ref 65–99)
HCT VFR BLD AUTO: 40.3 % (ref 37–47)
HGB BLD-MCNC: 12.9 G/DL (ref 12–16)
LYMPHOCYTES # BLD AUTO: 0.45 K/UL (ref 1–4.8)
LYMPHOCYTES NFR BLD: 4.3 % (ref 22–41)
MANUAL DIFF BLD: NORMAL
MCH RBC QN AUTO: 30.5 PG (ref 27–33)
MCHC RBC AUTO-ENTMCNC: 32 G/DL (ref 33.6–35)
MCV RBC AUTO: 95.3 FL (ref 81.4–97.8)
MONOCYTES # BLD AUTO: 0.09 K/UL (ref 0–0.85)
MONOCYTES NFR BLD AUTO: 0.9 % (ref 0–13.4)
MORPHOLOGY BLD-IMP: NORMAL
NEUTROPHILS # BLD AUTO: 9.95 K/UL (ref 2–7.15)
NEUTROPHILS NFR BLD: 94.8 % (ref 44–72)
NRBC # BLD AUTO: 0 K/UL
NRBC BLD AUTO-RTO: 0 /100 WBC
PLATELET # BLD AUTO: 190 K/UL (ref 164–446)
PMV BLD AUTO: 11.3 FL (ref 9–12.9)
POTASSIUM SERPL-SCNC: 5.4 MMOL/L (ref 3.6–5.5)
PROT SERPL-MCNC: 4.8 G/DL (ref 6–8.2)
RBC # BLD AUTO: 4.23 M/UL (ref 4.2–5.4)
SODIUM SERPL-SCNC: 130 MMOL/L (ref 135–145)
WBC # BLD AUTO: 10.5 K/UL (ref 4.8–10.8)

## 2017-01-11 PROCEDURE — 700102 HCHG RX REV CODE 250 W/ 637 OVERRIDE(OP)

## 2017-01-11 PROCEDURE — A9270 NON-COVERED ITEM OR SERVICE: HCPCS | Performed by: HOSPITALIST

## 2017-01-11 PROCEDURE — 76937 US GUIDE VASCULAR ACCESS: CPT

## 2017-01-11 PROCEDURE — 700111 HCHG RX REV CODE 636 W/ 250 OVERRIDE (IP): Performed by: HOSPITALIST

## 2017-01-11 PROCEDURE — 770001 HCHG ROOM/CARE - MED/SURG/GYN PRIV*

## 2017-01-11 PROCEDURE — A9270 NON-COVERED ITEM OR SERVICE: HCPCS | Performed by: INTERNAL MEDICINE

## 2017-01-11 PROCEDURE — 700111 HCHG RX REV CODE 636 W/ 250 OVERRIDE (IP): Performed by: INTERNAL MEDICINE

## 2017-01-11 PROCEDURE — 99233 SBSQ HOSP IP/OBS HIGH 50: CPT | Performed by: HOSPITALIST

## 2017-01-11 PROCEDURE — 700102 HCHG RX REV CODE 250 W/ 637 OVERRIDE(OP): Performed by: HOSPITALIST

## 2017-01-11 PROCEDURE — A9270 NON-COVERED ITEM OR SERVICE: HCPCS | Performed by: NEUROLOGICAL SURGERY

## 2017-01-11 PROCEDURE — 80053 COMPREHEN METABOLIC PANEL: CPT

## 2017-01-11 PROCEDURE — 700102 HCHG RX REV CODE 250 W/ 637 OVERRIDE(OP): Performed by: NEUROLOGICAL SURGERY

## 2017-01-11 PROCEDURE — 36569 INSJ PICC 5 YR+ W/O IMAGING: CPT

## 2017-01-11 PROCEDURE — 36415 COLL VENOUS BLD VENIPUNCTURE: CPT

## 2017-01-11 PROCEDURE — 700112 HCHG RX REV CODE 229: Performed by: HOSPITALIST

## 2017-01-11 PROCEDURE — A9270 NON-COVERED ITEM OR SERVICE: HCPCS

## 2017-01-11 PROCEDURE — 82962 GLUCOSE BLOOD TEST: CPT | Mod: 91

## 2017-01-11 PROCEDURE — 700102 HCHG RX REV CODE 250 W/ 637 OVERRIDE(OP): Performed by: INTERNAL MEDICINE

## 2017-01-11 PROCEDURE — 85007 BL SMEAR W/DIFF WBC COUNT: CPT

## 2017-01-11 PROCEDURE — 85027 COMPLETE CBC AUTOMATED: CPT

## 2017-01-11 RX ADMIN — PIOGLITAZONE 30 MG: 30 TABLET ORAL at 09:22

## 2017-01-11 RX ADMIN — INSULIN LISPRO 3 UNITS: 100 INJECTION, SOLUTION INTRAVENOUS; SUBCUTANEOUS at 21:38

## 2017-01-11 RX ADMIN — SIMVASTATIN 10 MG: 20 TABLET, FILM COATED ORAL at 21:31

## 2017-01-11 RX ADMIN — Medication 1 TABLET: at 21:31

## 2017-01-11 RX ADMIN — LEVETIRACETAM 500 MG: 500 TABLET, FILM COATED ORAL at 09:22

## 2017-01-11 RX ADMIN — DEXAMETHASONE SODIUM PHOSPHATE 4 MG: 4 INJECTION, SOLUTION INTRAMUSCULAR; INTRAVENOUS at 00:04

## 2017-01-11 RX ADMIN — HEPARIN SODIUM 5000 UNITS: 5000 INJECTION INTRAVENOUS; SUBCUTANEOUS at 21:31

## 2017-01-11 RX ADMIN — DOCUSATE SODIUM 100 MG: 100 CAPSULE ORAL at 09:22

## 2017-01-11 RX ADMIN — DEXAMETHASONE SODIUM PHOSPHATE 4 MG: 4 INJECTION, SOLUTION INTRAMUSCULAR; INTRAVENOUS at 06:05

## 2017-01-11 RX ADMIN — SODIUM BICARBONATE TAB 650 MG 1300 MG: 650 TAB at 09:42

## 2017-01-11 RX ADMIN — INSULIN LISPRO 7 UNITS: 100 INJECTION, SOLUTION INTRAVENOUS; SUBCUTANEOUS at 12:43

## 2017-01-11 RX ADMIN — HEPARIN SODIUM 5000 UNITS: 5000 INJECTION INTRAVENOUS; SUBCUTANEOUS at 06:05

## 2017-01-11 RX ADMIN — DILTIAZEM HYDROCHLORIDE 240 MG: 240 CAPSULE, COATED, EXTENDED RELEASE ORAL at 09:42

## 2017-01-11 RX ADMIN — METOPROLOL TARTRATE 50 MG: 50 TABLET, FILM COATED ORAL at 21:31

## 2017-01-11 RX ADMIN — HEPARIN SODIUM 5000 UNITS: 5000 INJECTION INTRAVENOUS; SUBCUTANEOUS at 14:20

## 2017-01-11 RX ADMIN — SODIUM CHLORIDE TAB 1 GM 1 G: 1 TAB at 09:23

## 2017-01-11 RX ADMIN — AMLODIPINE BESYLATE 5 MG: 5 TABLET ORAL at 09:22

## 2017-01-11 RX ADMIN — INSULIN LISPRO 4 UNITS: 100 INJECTION, SOLUTION INTRAVENOUS; SUBCUTANEOUS at 06:07

## 2017-01-11 RX ADMIN — LEVETIRACETAM 500 MG: 500 TABLET, FILM COATED ORAL at 21:31

## 2017-01-11 RX ADMIN — METOPROLOL TARTRATE 50 MG: 50 TABLET, FILM COATED ORAL at 09:22

## 2017-01-11 RX ADMIN — INSULIN GLARGINE 20 UNITS: 100 INJECTION, SOLUTION SUBCUTANEOUS at 21:38

## 2017-01-11 RX ADMIN — SODIUM CHLORIDE TAB 1 GM 1 G: 1 TAB at 13:07

## 2017-01-11 ASSESSMENT — ENCOUNTER SYMPTOMS
COUGH: 0
NAUSEA: 1
VOMITING: 0
DEPRESSION: 0
SORE THROAT: 0
PND: 0
ORTHOPNEA: 0
HEADACHES: 1
NECK PAIN: 0
TINGLING: 0
CHILLS: 0
BACK PAIN: 0
FEVER: 0
DIZZINESS: 0
BLURRED VISION: 0
HEMOPTYSIS: 0
INSOMNIA: 0
PALPITATIONS: 0
DOUBLE VISION: 0

## 2017-01-11 NOTE — PROGRESS NOTES
[facility] Internal Medicine Nephrology note    Name Yina Stephens 1945   Age/Sex 71 y.o. female   MRN [id number]       ID: 71 year old female with PMHx DM type 2, CKD who presented with headache and N/V and was found to have large right temporal intracranial mass on CT head ---> MRI brain reveals large right temporal lobe mass with extensive vasogenic edema most consistent with glioblastoma multiforme. There is R>L midline shift.    Consult to manage ANNABEL on CKD - patient has repeatedly said as outpatient - under no circumstances would she accept dialysis either temporarily or permanently.  HOWEVER - she has recanted that statement to me at this point.    DAILY NEPHROLOGY SUMMARY  17 - seen in consultation by Dr Chua.  ANNABEL felt likely due to pre-renal azotemia from poor intake w Nausea/headaches  17 - renal function not improved - no fluids given overnight - initiating x 1L  1/10/17 - Cr dropped from 5.17 to 5.09. N/V improved as well as headache. K: 5.2. Nearly euvolemic. Better appetite. No indication to start HD now.   17 - MR done again on Khari 10. Nephro team double checked with Radiology office at [id number], the MR was done WITHOUT contrast. No HD today even her Cr is going up. No subjective uremic complaints. Near euvolemic status.       Interval Problem Daily Status Update    Active Hospital Problems    Diagnosis   • Type 2 diabetes mellitus with stage 3 chronic kidney disease (HCC) [E11.22, N18.3]   • Intracranial mass [R90.0]   • Acute on chronic renal insufficiency (HCC) [N28.9, N18.9]   • HTN (hypertension) [I10]       Past Medical History   Diagnosis Date   • Hiatus hernia syndrome    • Pain    • Renal disorder    • CATARACT        ROS   Constitutional: Denies fevers.   Eyes: Denies changes in vision, no eye pain  Ears/Nose/Throat/Mouth: Denies nasal congestion or sore throat   Cardiovascular: Denies chest pain or palpitations   Respiratory: Denies shortness of  breath , Denies cough  Gastrointestinal/Hepatic: Denies abdominal pain. N/V improved  Genitourinary: Denies bladder dysfunction, dysuria or frequency  Musculoskeletal/Rheum: No complaints   Skin/Breast: Denies rash   Psychiatric: Denies mood disorder   All other systems were reviewed and are negative (AMA/CMS criteria)    Quality Measures  Core Measures  Per Primary team.   On heparin Ppx.   No anti.   Medication, lab, code status, vitals, images and orders were reviewed.       Physical Exam       Filed Vitals:    01/11/17 0400 01/11/17 0755 01/11/17 0922 01/11/17 0942   BP: 138/62 126/64 126/64 126/64   Pulse: 63 59 62 62   Temp: 36.1 °C (96.9 °F) 35.8 °C (96.5 °F)     Resp: 16 16     Height:       Weight:       SpO2: 92% 93%       Body mass index is 27.17 kg/(m^2).    Oxygen Therapy:  Pulse Oximetry: 93 %, O2 (LPM): 2, O2 Delivery: Nasal Cannula    Physical Exam  HEENT: grossly normal   Cardiovascular: Normal heart rate, Normal rhythm   Lungs: Respiratory effort is normal on low dose N/C O2. Normal breath sounds  Abdomen: Bowel sounds normal, Soft, No tenderness  Skin: No erythema, No rash  Lower limbs: normal, no pitting edema   Neurologic: Alert & oriented x 3, Normal motor function, Normal sensory function, No focal deficits noted, cranial nerves II through XII are normal  PSY: stable mood.         Lab Data Review:      1/10/2017  1:46 PM    Recent Labs      01/09/17   0341  01/10/17   0343  01/11/17   0203   SODIUM  132*  130*  130*   POTASSIUM  4.6  5.2  5.4   CHLORIDE  105  103  104   CO2  18*  18*  15*   BUN  71*  83*  94*   CREATININE  5.17*  5.09*  5.93*   CALCIUM  7.7*  7.7*  7.5*       Recent Labs      01/09/17   0341  01/10/17   0343  01/11/17   0203   ALTSGPT   --   <5  <5   ASTSGOT   --   5*  5*   ALKPHOSPHAT   --   78  58   TBILIRUBIN   --   0.3  0.3   GLUCOSE  294*  241*  230*       Recent Labs      01/09/17   0341  01/10/17   0343  01/11/17   0203   RBC  3.89*  4.33  4.23   HEMOGLOBIN  11.7*  13.1   12.9   HEMATOCRIT  36.2*  40.2  40.3   PLATELETCT  184  200  190       Recent Labs      01/09/17   0341  01/10/17   0343  01/11/17   0203   WBC  8.9  10.4  10.5   NEUTSPOLYS   --   90.40*  94.80*   LYMPHOCYTES   --   5.60*  4.30*   MONOCYTES   --   2.40  0.90   EOSINOPHILS   --   0.00  0.00   BASOPHILS   --   0.20  0.00   ASTSGOT   --   5*  5*   ALTSGPT   --   <5  <5   ALKPHOSPHAT   --   78  58   TBILIRUBIN   --   0.3  0.3           Assessment/Plan   1. ANNABEL on CKD (her baseline Cr in last 6month was 3.7-4)  - Cr 5.9. HD catheter on Jan 12. Urine lyte still pending. Bun/Cr 94/5.9.   - No I/O data from yesterday. Stable BP around 140 systolically. HR around 60  - No recent cardiac echo, last A1C: 5.9 (Jan 2017)  - [facility] [place] ultrasound: only one kidney, right kid: 11.75cm, no hydro.   - Protein/Cr >6000, no blood in crystal in UA.   - iron Sat 38, ferrutin 235, not on ferrous. Hgb 13.   - K: 5.4, HCO3: 15. On sodium bicarb. Corrected Ca:8.5, PO4:3.7, . Not on PO4 binder now.   - Called radiology office, there was NO contrast given yesterday.   Plan:  --> no indication for HD today, but might need HD during/after surgery (brain tumor), will arrange 3-lumen HD catheter on Thur or Fri.  --> Avoid nephro-toxic agents or contrast.   --> Keep current strategy of fluid status management.     2. Brain tumor/mass  - On Decadron 4mg Q6, symptoms including Headache and N/V improved.   -->follow Onc, and NeuroSurgry's recs.     3. HTN, chronic  - Around 130-140 systolically. On diltiazem, metoprolol now. No ACEI/ARB for now.     4. HypoNa, 130-132. Mostly likely from SIADH. On salt tab.   Plan:  Continue salt tab, consider water restriction if Na keeps going down. If poor response to water restriction and salt tab, Patricio/UOsmo might be helpful to confirm diagnosis.     5. DM II, with A1C 5.9. Continue current management.   6. Mild Anemia, might be related to CKD, current Hgb at 13 with good Sat at 38. No intervention for  now.

## 2017-01-11 NOTE — PROGRESS NOTES
Patient received, report taken, appears sleeping with family member at beside, respirations even and unlabored with no s/s of distress, call light placed within reach

## 2017-01-11 NOTE — PROGRESS NOTES
Hospital Medicine Progress Note, Adult, Complex               Author: Tor pro Date & Time created: 1/11/2017  7:38 AM     71 year old female with CKD presented with headache and N/V and was found to have large right temporal intracranial mass, GBM. NSx, Onc and Nephrology following.       Interval History:  Doing well. We discussed code status again and she wishes to be a dnr. No other events.     Review of Systems:  Review of Systems   Constitutional: Negative for fever and chills.   HENT: Negative for sore throat.    Eyes: Negative for blurred vision and double vision.   Respiratory: Negative for cough and hemoptysis.    Cardiovascular: Negative for palpitations, orthopnea and PND.   Gastrointestinal: Positive for nausea. Negative for vomiting.   Genitourinary: Negative for dysuria and urgency.   Musculoskeletal: Negative for back pain and neck pain.   Skin: Negative for itching and rash.   Neurological: Positive for headaches. Negative for dizziness and tingling.   Psychiatric/Behavioral: Negative for depression. The patient does not have insomnia.    All other systems reviewed and are negative.      Physical Exam:  Physical Exam   Constitutional: She appears well-developed and well-nourished. No distress.   HENT:   Head: Normocephalic and atraumatic.   Right Ear: External ear normal.   Left Ear: External ear normal.   Nose: Nose normal.   Eyes: Right eye exhibits no discharge. Left eye exhibits no discharge. No scleral icterus.   Neck: No JVD present.   Cardiovascular: Normal rate, regular rhythm and normal heart sounds.    No murmur heard.  Pulmonary/Chest: Effort normal and breath sounds normal. No stridor. No respiratory distress. She has no rales.   Abdominal: Soft. Bowel sounds are normal. She exhibits no distension. There is no tenderness.   Musculoskeletal: Normal range of motion. She exhibits no tenderness.   Neurological: She is alert. No cranial nerve deficit. Coordination normal.   Skin: Skin  is warm and dry. She is not diaphoretic. No erythema.   Psychiatric: She has a normal mood and affect. Her behavior is normal.   Nursing note and vitals reviewed.      Labs:        Invalid input(s): [id number]      Recent Labs      01/09/17   0341  01/10/17   0343  01/11/17   0203   SODIUM  132*  130*  130*   POTASSIUM  4.6  5.2  5.4   CHLORIDE  105  103  104   CO2  18*  18*  15*   BUN  71*  83*  94*   CREATININE  5.17*  5.09*  5.93*   CALCIUM  7.7*  7.7*  7.5*     Recent Labs      01/09/17   0341  01/10/17   0343  01/11/17   0203   ALTSGPT   --   <5  <5   ASTSGOT   --   5*  5*   ALKPHOSPHAT   --   78  58   TBILIRUBIN   --   0.3  0.3   GLUCOSE  294*  241*  230*     Recent Labs      01/09/17   0341  01/10/17   0343  01/11/17   0203   RBC  3.89*  4.33  4.23   HEMOGLOBIN  11.7*  13.1  12.9   HEMATOCRIT  36.2*  40.2  40.3   PLATELETCT  184  200  190     Recent Labs      01/09/17   0341  01/10/17   0343  01/11/17   0203   WBC  8.9  10.4  10.5   NEUTSPOLYS   --   90.40*  94.80*   LYMPHOCYTES   --   5.60*  4.30*   MONOCYTES   --   2.40  0.90   EOSINOPHILS   --   0.00  0.00   BASOPHILS   --   0.20  0.00   ASTSGOT   --   5*  5*   ALTSGPT   --   <5  <5   ALKPHOSPHAT   --   78  58   TBILIRUBIN   --   0.3  0.3           Hemodynamics:  Temp (24hrs), Av.2 °C (97.1 °F), Min:36.1 °C (96.9 °F), Max:36.3 °C (97.4 °F)  Temperature: 36.1 °C (96.9 °F)  Pulse  Av.7  Min: 57  Max: 120   Blood Pressure : 138/62 mmHg     Respiratory:    Respiration: 16, Pulse Oximetry: 92 %     Work Of Breathing / Effort: Mild  RUL Breath Sounds: Clear, RML Breath Sounds: Clear, RLL Breath Sounds: Clear, SOURAV Breath Sounds: Clear, LLL Breath Sounds: Clear  Fluids:  No intake or output data in the 24 hours ending 17 0738     GI/Nutrition:  Orders Placed This Encounter   Procedures   • Diet Order     Standing Status: Standing      Number of Occurrences: 1      Standing Expiration Date:      Order Specific Question:  Diet:     Answer:   Diabetic [3]     Medical Decision Making, by Problem:  Active Hospital Problems    Diagnosis   • • Intracranial mass [R90.0]   -Likely Glioblastoma per MRI  -planned for surgical resection per  Neurosurgery  -aspirin off   -continue Steroids, Keppra  -Ok for DVT prophylaxis, will hold dose prior to day of surgery    • Acute on chronic renal insufficiency (HCC) [N28.9, N18.9]   -worsening   -IVF bolus  -increase PO intake  -Avoid IV contrast/nephrotoxins/NSAIDs  -Dose adjust meds for decreased GFR  -continue to monitor BMP    Type 2 diabetes mellitus with stage 3 chronic kidney disease (HCC) [E11.22, N18.3]  -uncontrolled likely due to steroids, evidence for previous control. -only 5.9 for HbA1c  -now on Lantus 20 U  -continue ISS with serial accu-checks           • HTN (hypertension) [I10]  -benign, controlled  - toprol xl, cardizem and amlodipine  -Labetalol PRN    hyponatremia- Salt tabs    Dicussed plan with RN  dispo- likely snf but not clear yet.   Dnr      Labs reviewed, Medications reviewed, Radiology images reviewed and EKG reviewed  Hillman catheter: No Hillman      DVT Prophylaxis: Heparin    Ulcer prophylaxis: Not indicated    Assessed for rehab: Patient was assess for and/or received rehabilitation services during this hospitalization

## 2017-01-11 NOTE — PROGRESS NOTES
Pt alert, slightly more drowsy tonight, complaining of mild h/a and nausea, RN gave tylenol and Zofran per pt request. A&Ox4, PERRLA, seizure precautions in place, bed alarm on, call light within reach, brother at bedside for support, will continue to monitor.

## 2017-01-11 NOTE — CARE PLAN
Problem: Infection  Goal: Will remain free from infection  Intervention: Assess signs and symptoms of infection  No s/s of infection       Problem: Knowledge Deficit  Goal: Knowledge of disease process/condition, treatment plan, diagnostic tests, and medications will improve  Intervention: Assess knowledge level of disease process/condition, treatment plan, diagnostic tests, and medications  Reviewing plan of care, activities, and medication with patient.  Encouraging patient to ask questions and participate in plan of care.  Providing answers to all questions.  Continuing with current plan of care.  Hourly rounding in practice.

## 2017-01-12 ENCOUNTER — APPOINTMENT (OUTPATIENT)
Dept: RADIOLOGY | Facility: MEDICAL CENTER | Age: 72
DRG: 025 | End: 2017-01-12
Attending: EMERGENCY MEDICINE
Payer: MEDICARE

## 2017-01-12 LAB
GLUCOSE BLD-MCNC: 112 MG/DL (ref 65–99)
GLUCOSE BLD-MCNC: 121 MG/DL (ref 65–99)
GLUCOSE BLD-MCNC: 127 MG/DL (ref 65–99)
GLUCOSE BLD-MCNC: 157 MG/DL (ref 65–99)

## 2017-01-12 PROCEDURE — 770001 HCHG ROOM/CARE - MED/SURG/GYN PRIV*

## 2017-01-12 PROCEDURE — A9270 NON-COVERED ITEM OR SERVICE: HCPCS

## 2017-01-12 PROCEDURE — 02HV33Z INSERTION OF INFUSION DEVICE INTO SUPERIOR VENA CAVA, PERCUTANEOUS APPROACH: ICD-10-PCS | Performed by: RADIOLOGY

## 2017-01-12 PROCEDURE — 77001 FLUOROGUIDE FOR VEIN DEVICE: CPT

## 2017-01-12 PROCEDURE — A9270 NON-COVERED ITEM OR SERVICE: HCPCS | Performed by: NEUROLOGICAL SURGERY

## 2017-01-12 PROCEDURE — 700111 HCHG RX REV CODE 636 W/ 250 OVERRIDE (IP): Performed by: INTERNAL MEDICINE

## 2017-01-12 PROCEDURE — 700102 HCHG RX REV CODE 250 W/ 637 OVERRIDE(OP): Performed by: INTERNAL MEDICINE

## 2017-01-12 PROCEDURE — 99233 SBSQ HOSP IP/OBS HIGH 50: CPT | Performed by: HOSPITALIST

## 2017-01-12 PROCEDURE — 36556 INSERT NON-TUNNEL CV CATH: CPT

## 2017-01-12 PROCEDURE — B548ZZA ULTRASONOGRAPHY OF SUPERIOR VENA CAVA, GUIDANCE: ICD-10-PCS | Performed by: RADIOLOGY

## 2017-01-12 PROCEDURE — 700105 HCHG RX REV CODE 258: Performed by: HOSPITALIST

## 2017-01-12 PROCEDURE — A9270 NON-COVERED ITEM OR SERVICE: HCPCS | Performed by: HOSPITALIST

## 2017-01-12 PROCEDURE — 700112 HCHG RX REV CODE 229: Performed by: HOSPITALIST

## 2017-01-12 PROCEDURE — 82962 GLUCOSE BLOOD TEST: CPT

## 2017-01-12 PROCEDURE — 700111 HCHG RX REV CODE 636 W/ 250 OVERRIDE (IP): Performed by: HOSPITALIST

## 2017-01-12 PROCEDURE — 700111 HCHG RX REV CODE 636 W/ 250 OVERRIDE (IP): Performed by: RADIOLOGY

## 2017-01-12 PROCEDURE — A9270 NON-COVERED ITEM OR SERVICE: HCPCS | Performed by: INTERNAL MEDICINE

## 2017-01-12 PROCEDURE — B5181ZA FLUOROSCOPY OF SUPERIOR VENA CAVA USING LOW OSMOLAR CONTRAST, GUIDANCE: ICD-10-PCS | Performed by: RADIOLOGY

## 2017-01-12 PROCEDURE — 700102 HCHG RX REV CODE 250 W/ 637 OVERRIDE(OP)

## 2017-01-12 PROCEDURE — 76937 US GUIDE VASCULAR ACCESS: CPT

## 2017-01-12 PROCEDURE — 700102 HCHG RX REV CODE 250 W/ 637 OVERRIDE(OP): Performed by: HOSPITALIST

## 2017-01-12 PROCEDURE — C1894 INTRO/SHEATH, NON-LASER: HCPCS

## 2017-01-12 PROCEDURE — 700102 HCHG RX REV CODE 250 W/ 637 OVERRIDE(OP): Performed by: NEUROLOGICAL SURGERY

## 2017-01-12 PROCEDURE — 700111 HCHG RX REV CODE 636 W/ 250 OVERRIDE (IP)

## 2017-01-12 RX ORDER — MIDAZOLAM HYDROCHLORIDE 1 MG/ML
INJECTION INTRAMUSCULAR; INTRAVENOUS
Status: COMPLETED
Start: 2017-01-12 | End: 2017-01-12

## 2017-01-12 RX ORDER — ONDANSETRON 2 MG/ML
INJECTION INTRAMUSCULAR; INTRAVENOUS
Status: COMPLETED
Start: 2017-01-12 | End: 2017-01-12

## 2017-01-12 RX ORDER — ONDANSETRON 2 MG/ML
4 INJECTION INTRAMUSCULAR; INTRAVENOUS PRN
Status: ACTIVE | OUTPATIENT
Start: 2017-01-12 | End: 2017-01-12

## 2017-01-12 RX ORDER — MIDAZOLAM HYDROCHLORIDE 1 MG/ML
.5-2 INJECTION INTRAMUSCULAR; INTRAVENOUS PRN
Status: DISCONTINUED | OUTPATIENT
Start: 2017-01-12 | End: 2017-01-12

## 2017-01-12 RX ORDER — SODIUM CHLORIDE 9 MG/ML
500 INJECTION, SOLUTION INTRAVENOUS PRN
Status: DISCONTINUED | OUTPATIENT
Start: 2017-01-12 | End: 2017-01-23

## 2017-01-12 RX ORDER — 3% SODIUM CHLORIDE 3 G/100ML
INJECTION, SOLUTION INTRAVENOUS CONTINUOUS
Status: DISCONTINUED | OUTPATIENT
Start: 2017-01-12 | End: 2017-01-13

## 2017-01-12 RX ADMIN — MIDAZOLAM HYDROCHLORIDE 1 MG: 1 INJECTION, SOLUTION INTRAMUSCULAR; INTRAVENOUS at 15:23

## 2017-01-12 RX ADMIN — ACETAMINOPHEN 650 MG: 325 TABLET, FILM COATED ORAL at 20:46

## 2017-01-12 RX ADMIN — HEPARIN 500 UNITS: 100 SYRINGE at 15:41

## 2017-01-12 RX ADMIN — METOPROLOL TARTRATE 50 MG: 50 TABLET, FILM COATED ORAL at 08:10

## 2017-01-12 RX ADMIN — SODIUM CHLORIDE: 3 INJECTION, SOLUTION INTRAVENOUS at 17:27

## 2017-01-12 RX ADMIN — MIDAZOLAM 1 MG: 1 INJECTION INTRAMUSCULAR; INTRAVENOUS at 15:15

## 2017-01-12 RX ADMIN — AMLODIPINE BESYLATE 5 MG: 5 TABLET ORAL at 08:09

## 2017-01-12 RX ADMIN — MIDAZOLAM HYDROCHLORIDE 1 MG: 1 INJECTION, SOLUTION INTRAMUSCULAR; INTRAVENOUS at 15:15

## 2017-01-12 RX ADMIN — SIMVASTATIN 10 MG: 20 TABLET, FILM COATED ORAL at 20:36

## 2017-01-12 RX ADMIN — HEPARIN SODIUM 5000 UNITS: 5000 INJECTION INTRAVENOUS; SUBCUTANEOUS at 20:36

## 2017-01-12 RX ADMIN — FENTANYL CITRATE 25 MCG: 50 INJECTION, SOLUTION INTRAMUSCULAR; INTRAVENOUS at 15:15

## 2017-01-12 RX ADMIN — DEXAMETHASONE SODIUM PHOSPHATE 4 MG: 4 INJECTION, SOLUTION INTRAMUSCULAR; INTRAVENOUS at 17:26

## 2017-01-12 RX ADMIN — Medication 1 TABLET: at 20:36

## 2017-01-12 RX ADMIN — INSULIN GLARGINE 20 UNITS: 100 INJECTION, SOLUTION SUBCUTANEOUS at 20:37

## 2017-01-12 RX ADMIN — SODIUM CHLORIDE TAB 1 GM 1 G: 1 TAB at 13:55

## 2017-01-12 RX ADMIN — ONDANSETRON 4 MG: 2 INJECTION, SOLUTION INTRAMUSCULAR; INTRAVENOUS at 15:18

## 2017-01-12 RX ADMIN — SODIUM BICARBONATE TAB 650 MG 1300 MG: 650 TAB at 08:09

## 2017-01-12 RX ADMIN — LEVETIRACETAM 500 MG: 500 TABLET, FILM COATED ORAL at 20:36

## 2017-01-12 RX ADMIN — DOCUSATE SODIUM 100 MG: 100 CAPSULE ORAL at 08:10

## 2017-01-12 RX ADMIN — DEXAMETHASONE SODIUM PHOSPHATE 4 MG: 4 INJECTION, SOLUTION INTRAMUSCULAR; INTRAVENOUS at 06:00

## 2017-01-12 RX ADMIN — SODIUM CHLORIDE TAB 1 GM 1 G: 1 TAB at 08:10

## 2017-01-12 RX ADMIN — FENTANYL CITRATE 25 MCG: 50 INJECTION, SOLUTION INTRAMUSCULAR; INTRAVENOUS at 15:24

## 2017-01-12 RX ADMIN — DEXAMETHASONE SODIUM PHOSPHATE 4 MG: 4 INJECTION, SOLUTION INTRAMUSCULAR; INTRAVENOUS at 13:55

## 2017-01-12 RX ADMIN — DEXAMETHASONE SODIUM PHOSPHATE 4 MG: 4 INJECTION, SOLUTION INTRAMUSCULAR; INTRAVENOUS at 00:36

## 2017-01-12 RX ADMIN — PIOGLITAZONE 30 MG: 30 TABLET ORAL at 08:10

## 2017-01-12 RX ADMIN — METOPROLOL TARTRATE 50 MG: 50 TABLET, FILM COATED ORAL at 20:36

## 2017-01-12 RX ADMIN — LEVETIRACETAM 500 MG: 500 TABLET, FILM COATED ORAL at 08:10

## 2017-01-12 RX ADMIN — HEPARIN SODIUM 5000 UNITS: 5000 INJECTION INTRAVENOUS; SUBCUTANEOUS at 06:00

## 2017-01-12 RX ADMIN — DILTIAZEM HYDROCHLORIDE 240 MG: 240 CAPSULE, COATED, EXTENDED RELEASE ORAL at 08:09

## 2017-01-12 ASSESSMENT — ENCOUNTER SYMPTOMS
COUGH: 0
ABDOMINAL PAIN: 0
INSOMNIA: 0
TINGLING: 0
CHILLS: 0
DEPRESSION: 0
DOUBLE VISION: 0
PALPITATIONS: 0
VOMITING: 1
NAUSEA: 1
PND: 0
HEMOPTYSIS: 0
NECK PAIN: 0
SORE THROAT: 0
BACK PAIN: 0
HEADACHES: 0
SHORTNESS OF BREATH: 0
VOMITING: 0
DIZZINESS: 0
BLURRED VISION: 0
HEARTBURN: 0
FEVER: 0
ORTHOPNEA: 0

## 2017-01-12 ASSESSMENT — PAIN SCALES - GENERAL
PAINLEVEL_OUTOF10: 3
PAINLEVEL_OUTOF10: 0
PAINLEVEL_OUTOF10: 0

## 2017-01-12 NOTE — CARE PLAN
Problem: Pain Management  Goal: Pain level will decrease to patient’s comfort goal  Outcome: PROGRESSING AS EXPECTED  Pt denies any pain or need for pain medications at this time. Will continue to monitor.    Problem: Safety  Goal: Will remain free from injury  Outcome: PROGRESSING AS EXPECTED  Bed locked and in lowest position. Call light in place. Treaded socks and bed alarm on. Family at bedside.     Problem: Knowledge Deficit  Goal: Knowledge of disease process/condition, treatment plan, diagnostic tests, and medications will improve  Outcome: PROGRESSING AS EXPECTED  POC discussed with patient and family. All questions answered at this time.

## 2017-01-12 NOTE — PROGRESS NOTES
Patient underwent a tunneled temporary HD catheter Right IJ by Dr. Johnson. Pt remained hemodynamically stable during procedure. Report called to Monica AGUIRRE. Pt transported by bed to [facility] in a stable condition.  Edd Daigle 12.5 x 16 cm Ref # [id number] Lot # [id number]

## 2017-01-12 NOTE — PROGRESS NOTES
Hospital Medicine Progress Note, Adult, Complex               Author: Tor pro Date & Time created: 1/12/2017  8:46 AM     71 year old female with CKD presented with headache and N/V and was found to have large right temporal intracranial mass, GBM. NSx, Onc and Nephrology following.       Interval History:  Vomiting this afternoon. After salt tabs. A little sleepy today but easily aroused.     Review of Systems:  Review of Systems   Constitutional: Positive for malaise/fatigue. Negative for fever and chills.   HENT: Negative for sore throat.    Eyes: Negative for blurred vision and double vision.   Respiratory: Negative for cough and hemoptysis.    Cardiovascular: Negative for palpitations, orthopnea and PND.   Gastrointestinal: Positive for nausea and vomiting.   Genitourinary: Negative for dysuria and urgency.   Musculoskeletal: Negative for back pain and neck pain.   Skin: Negative for itching and rash.   Neurological: Negative for dizziness, tingling and headaches.   Psychiatric/Behavioral: Negative for depression. The patient does not have insomnia.    All other systems reviewed and are negative.      Physical Exam:  Physical Exam   Constitutional: She is oriented to person, place, and time. She appears well-developed and well-nourished. No distress.   HENT:   Head: Normocephalic and atraumatic.   Right Ear: External ear normal.   Left Ear: External ear normal.   Nose: Nose normal.   Eyes: Right eye exhibits no discharge. Left eye exhibits no discharge. No scleral icterus.   Neck: No JVD present.   Cardiovascular: Normal rate, regular rhythm and normal heart sounds.    No murmur heard.  Pulmonary/Chest: Effort normal and breath sounds normal. No stridor. No respiratory distress. She has no rales.   Abdominal: Soft. Bowel sounds are normal. She exhibits no distension. There is no tenderness.   Musculoskeletal: Normal range of motion. She exhibits no tenderness.   Neurological: She is alert and oriented  to person, place, and time. She displays normal reflexes. No cranial nerve deficit. She exhibits normal muscle tone.   Skin: Skin is warm and dry. She is not diaphoretic. No erythema.   Psychiatric: She has a normal mood and affect. Her behavior is normal.   Nursing note and vitals reviewed.      Labs:        Invalid input(s): [id number]      Recent Labs      01/10/17   0343  01/11/17   0203   SODIUM  130*  130*   POTASSIUM  5.2  5.4   CHLORIDE  103  104   CO2  18*  15*   BUN  83*  94*   CREATININE  5.09*  5.93*   CALCIUM  7.7*  7.5*     Recent Labs      01/10/17   0343  01/11/17   0203   ALTSGPT  <5  <5   ASTSGOT  5*  5*   ALKPHOSPHAT  78  58   TBILIRUBIN  0.3  0.3   GLUCOSE  241*  230*     Recent Labs      01/10/17   0343  01/11/17   0203   RBC  4.33  4.23   HEMOGLOBIN  13.1  12.9   HEMATOCRIT  40.2  40.3   PLATELETCT  200  190     Recent Labs      01/10/17   0343  01/11/17   0203   WBC  10.4  10.5   NEUTSPOLYS  90.40*  94.80*   LYMPHOCYTES  5.60*  4.30*   MONOCYTES  2.40  0.90   EOSINOPHILS  0.00  0.00   BASOPHILS  0.20  0.00   ASTSGOT  5*  5*   ALTSGPT  <5  <5   ALKPHOSPHAT  78  58   TBILIRUBIN  0.3  0.3           Hemodynamics:  Temp (24hrs), Av.3 °C (97.3 °F), Min:36.1 °C (97 °F), Max:36.4 °C (97.6 °F)  Temperature: 36.4 °C (97.6 °F)  Pulse  Av.8  Min: 57  Max: 120   Blood Pressure : 154/83 mmHg     Respiratory:    Respiration: 16, Pulse Oximetry: 94 %     Work Of Breathing / Effort: Mild  RUL Breath Sounds: Clear, RML Breath Sounds: Clear, RLL Breath Sounds: Clear, SOURAV Breath Sounds: Clear, LLL Breath Sounds: Clear  Fluids:  No intake or output data in the 24 hours ending 17 0846     GI/Nutrition:  Orders Placed This Encounter   Procedures   • Diet Order     Standing Status: Standing      Number of Occurrences: 1      Standing Expiration Date:      Order Specific Question:  Diet:     Answer:  Diabetic [3]     Medical Decision Making, by Problem:  Active Hospital Problems    Diagnosis   • •  Intracranial mass [R90.0]   -Likely Glioblastoma per MRI  -planned for surgical resection per  Neurosurgery  -continue Steroids, Keppra  • Acute on chronic renal insufficiency (HCC) [N28.9, N18.9]   -worsening   -IVF bolus with no effect.   HD cath placed today.   -continue to monitor BMP    Type 2 diabetes mellitus with stage 3 chronic kidney disease (HCC) [E11.22, N18.3]  -uncontrolled likely due to steroids, evidence for previous control. -only 5.9 for HbA1c  -now on Lantus 20 U  -continue ISS with serial accu-checks           • HTN (hypertension) [I10]  -benign, controlled  - toprol xl, cardizem and amlodipine  -Labetalol PRN    hyponatremia- Salt tabs causea vomiting. Change to slow 3% gtt and trend sodium.     Dicussed plan with RN  dispo- likely snf but not clear yet.         Labs reviewed, Medications reviewed, Radiology images reviewed and EKG reviewed  Hillman catheter: No Hillman      DVT Prophylaxis: Heparin    Ulcer prophylaxis: Not indicated    Assessed for rehab: Patient was assess for and/or received rehabilitation services during this hospitalization

## 2017-01-12 NOTE — OR SURGEON
Immediate Post- Operative Note        PostOp Diagnosis: RENAL FAILURE, GBM      Procedure(s): RIGHT INTERNAL JUGULAR NON TUNNELED TEMPORARY 12.5F MAHURKAR PLACEMENT WITH U/S AND FLUOROSCOPIC GUIDANCE      Estimated Blood Loss: <5CC (FLUSHES)        Complications: NONE            1/12/2017     3:32 PM     Bossman Johnson

## 2017-01-12 NOTE — PROGRESS NOTES
Bedside report completed form Radhika AGUIRRE. Assumed pt care. Pt is resting in bed A&O x4. Pt shows no signs of labored breathing or distress. Pt is on RA. Call light within reach. Bed in lowest position, safety precautions in place, bed alarm on.

## 2017-01-12 NOTE — PROGRESS NOTES
[facility] Nephrology Progress Note               Author: Nikolay Pierce MD Date & Time created: 1/12/2017  9:59 AM     Interval History:  71 year old female with PMHx DM type 2, CKD who presented with headache and N/V and was found to have large right temporal intracranial mass on CT head ---> MRI brain reveals large right temporal lobe mass with extensive vasogenic edema most consistent with glioblastoma multiforme. There is R>L midline shift.    Consult to manage ANNABEL on CKD - patient has repeatedly said as outpatient - under no circumstances would she accept dialysis either temporarily or permanently.  HOWEVER - she has recanted that statement to me at this point.    DAILY NEPHROLOGY SUMMARY  1/08/17 - seen in consultation by Dr Chua.  ANNABEL felt likely due to pre-renal azotemia from poor intake w Nausea/headaches  1/09/17 - renal function not improved - no fluids given overnight - initiating x 1L  1/10/17 - Cr dropped from 5.17 to 5.09. K: 5.2. Nearly euvolemic. Better appetite. No indication to start HD now.    1/11/17 - MR done again on Khari 10. Nephro team double checked with Radiology office at [phone], the MR was done WITHOUT contrast. No HD today even her Cr is going up. No subjective uremic complaints. Near euvolemic status.        Review of Systems   Constitutional: Negative for fever and chills.   Eyes: Negative for blurred vision and double vision.   Respiratory: Negative for cough and shortness of breath.    Cardiovascular: Negative for chest pain and PND.   Gastrointestinal: Positive for nausea. Negative for heartburn, vomiting and abdominal pain.   Genitourinary: Negative for dysuria.   Neurological: Negative for dizziness and headaches.   All other systems reviewed and are negative.      Physical Exam   Constitutional: She is oriented to person, place, and time. She appears well-developed and well-nourished.   HENT:   Head: Normocephalic and atraumatic.   Eyes: Conjunctivae are normal.    Cardiovascular: Normal rate and regular rhythm.    Pulmonary/Chest: Effort normal and breath sounds normal. No respiratory distress. She has no wheezes. She has no rales.   Abdominal: Soft. Bowel sounds are normal. She exhibits no distension. There is no tenderness.   Musculoskeletal: Normal range of motion.   Neurological: She is alert and oriented to person, place, and time. No cranial nerve deficit. Coordination normal.   Psychiatric: She has a normal mood and affect. Her behavior is normal.   Nursing note and vitals reviewed.      Labs:        Invalid input(s): [id number]      Recent Labs      01/10/17   0343  01/11/17   0203   SODIUM  130*  130*   POTASSIUM  5.2  5.4   CHLORIDE  103  104   CO2  18*  15*   BUN  83*  94*   CREATININE  5.09*  5.93*   CALCIUM  7.7*  7.5*     Recent Labs      01/10/17   0343  01/11/17   0203   ALTSGPT  <5  <5   ASTSGOT  5*  5*   ALKPHOSPHAT  78  58   TBILIRUBIN  0.3  0.3   GLUCOSE  241*  230*     Recent Labs      01/10/17   0343  01/11/17   0203   RBC  4.33  4.23   HEMOGLOBIN  13.1  12.9   HEMATOCRIT  40.2  40.3   PLATELETCT  200  190     Recent Labs      01/10/17   0343  01/11/17   0203   WBC  10.4  10.5   NEUTSPOLYS  90.40*  94.80*   LYMPHOCYTES  5.60*  4.30*   MONOCYTES  2.40  0.90   EOSINOPHILS  0.00  0.00   BASOPHILS  0.20  0.00   ASTSGOT  5*  5*   ALTSGPT  <5  <5   ALKPHOSPHAT  78  58   TBILIRUBIN  0.3  0.3           Hemodynamics:  Temp (24hrs), Av.3 °C (97.4 °F), Min:36.1 °C (97 °F), Max:36.4 °C (97.6 °F)  Temperature: 36.3 °C (97.4 °F)  Pulse  Av.8  Min: 57  Max: 120   Blood Pressure : 154/83 mmHg     Respiratory:    Respiration: 16, Pulse Oximetry: 92 %     Work Of Breathing / Effort: Mild  RUL Breath Sounds: Clear, RML Breath Sounds: Clear, RLL Breath Sounds: Clear, SOURAV Breath Sounds: Clear, LLL Breath Sounds: Clear  Fluids:  No intake or output data in the 24 hours ending 17 0959     GI/Nutrition:  Orders Placed This Encounter   Procedures   • DIET  NPO     Standing Status: Standing      Number of Occurrences: 1      Standing Expiration Date:      Order Specific Question:  Restrict to:     Answer:  Sips with Medications [3]     Medical Decision Making, by Problem:  Active Hospital Problems    Diagnosis       Assessment/Plan:  1. ANNABEL on CKD Stage III  --Cr 4.2 on admission now- SCreat now >5.  eGFR decline from 12-->9  --baseline Stg V CKD at baseline. (confirmed by call to office w Creatinine 3.8-->4.0 range as outpatient  --patient now says that she would accept dialysis temporarily or permanently if needed  --Pt tolerating PO and has increased her PO fluid intake today  --Avoid IV contrast/nephrotoxins/NSAIDs  --Pt also at risk for Nephrogenic Systemic Fibrosis with gadolinium, and if gadolinium is necessary for further imaging, pt will need to be on dialysis within 1hr of gadolinium exposure and will need 3 consecutive days of dialysis.    --Dose adjust meds for decreased GFR  -- HAve ordered Dialysis catheter to be placed in IR - plan to initiate dialysis pre-surgery.    2. Intracranial Mass  --MRI shows large heterogeneous partly hemorrhagic R temporal lobe mass with extensive vasogenic edema and midline shift consistent with glioblastoma multiforme  --On decadron  --Neurosurgery following - to OR on 1/15/17 planned  --Oncology Dr. Salomon also following    3. HTN  --sub-optimal control  --Continue home BP meds  --Start norvasc 5mg daily  --Avoid ACE-I/ARB for now    4. DM II--management per primary svc  5. Anemia--secondary to CKD, no need for TANISHA  6. Metabolic Acidosis--start PO bicarb supplements        Labs reviewed, Medications reviewed and Radiology images reviewed        DVT Prophylaxis: Heparin

## 2017-01-12 NOTE — PROGRESS NOTES
Pt A&Ox4, slightly drowsy, no complaints of pain/nausea, no s/s of distress. Ambulated once with FWW to bathroom, one assist, unsteady gait. Pt requesting more information from MD before signing HD consent. Pt educated to call for assist before getting OOB. Bed alarm on, SCDs on, brother at bedside for support, will continue to monitor.

## 2017-01-12 NOTE — CARE PLAN
Problem: Venous Thromboembolism (VTW)/Deep Vein Thrombosis (DVT) Prevention:  Goal: Patient will participate in Venous Thrombosis (VTE)/Deep Vein Thrombosis (DVT)Prevention Measures  Intervention: Ensure patient wears graduated elastic stockings (ADALGISA hose) and/or SCDs, if ordered, when in bed or chair (Remove at least once per shift for skin check)  No s/s of DVT, has sequentials on and ambulate in room and to the bathroom.          Problem: Knowledge Deficit  Goal: Knowledge of disease process/condition, treatment plan, diagnostic tests, and medications will improve  Intervention: Assess knowledge level of disease process/condition, treatment plan, diagnostic tests, and medications  Reviewing plan of care, activities, and medication with patient.  Encouraging patient to ask questions and participate in plan of care.  Providing answers to all questions.  Continuing with current plan of care.  Hourly rounding in practice.

## 2017-01-13 LAB
ANION GAP SERPL CALC-SCNC: 10 MMOL/L (ref 0–11.9)
BASOPHILS # BLD AUTO: 0.2 % (ref 0–1.8)
BASOPHILS # BLD: 0.02 K/UL (ref 0–0.12)
BUN SERPL-MCNC: 129 MG/DL (ref 8–22)
CALCIUM SERPL-MCNC: 8.1 MG/DL (ref 8.5–10.5)
CHLORIDE SERPL-SCNC: 101 MMOL/L (ref 96–112)
CO2 SERPL-SCNC: 17 MMOL/L (ref 20–33)
CREAT SERPL-MCNC: 6.57 MG/DL (ref 0.5–1.4)
EOSINOPHIL # BLD AUTO: 0 K/UL (ref 0–0.51)
EOSINOPHIL NFR BLD: 0 % (ref 0–6.9)
ERYTHROCYTE [DISTWIDTH] IN BLOOD BY AUTOMATED COUNT: 46.7 FL (ref 35.9–50)
GFR SERPL CREATININE-BSD FRML MDRD: 6 ML/MIN/1.73 M 2
GLUCOSE BLD-MCNC: 144 MG/DL (ref 65–99)
GLUCOSE BLD-MCNC: 162 MG/DL (ref 65–99)
GLUCOSE BLD-MCNC: 164 MG/DL (ref 65–99)
GLUCOSE BLD-MCNC: 198 MG/DL (ref 65–99)
GLUCOSE SERPL-MCNC: 179 MG/DL (ref 65–99)
HAV IGM SERPL QL IA: NEGATIVE
HBV CORE IGM SER QL: NEGATIVE
HBV SURFACE AB SERPL IA-ACNC: <3.1 MIU/ML (ref 0–10)
HBV SURFACE AG SER QL: NEGATIVE
HCT VFR BLD AUTO: 40.7 % (ref 37–47)
HCV AB SER QL: NEGATIVE
HGB BLD-MCNC: 13.7 G/DL (ref 12–16)
IMM GRANULOCYTES # BLD AUTO: 0.18 K/UL (ref 0–0.11)
IMM GRANULOCYTES NFR BLD AUTO: 1.8 % (ref 0–0.9)
LYMPHOCYTES # BLD AUTO: 0.57 K/UL (ref 1–4.8)
LYMPHOCYTES NFR BLD: 5.8 % (ref 22–41)
MCH RBC QN AUTO: 30.4 PG (ref 27–33)
MCHC RBC AUTO-ENTMCNC: 33.7 G/DL (ref 33.6–35)
MCV RBC AUTO: 90.4 FL (ref 81.4–97.8)
MONOCYTES # BLD AUTO: 0.21 K/UL (ref 0–0.85)
MONOCYTES NFR BLD AUTO: 2.1 % (ref 0–13.4)
NEUTROPHILS # BLD AUTO: 8.84 K/UL (ref 2–7.15)
NEUTROPHILS NFR BLD: 90.1 % (ref 44–72)
NRBC # BLD AUTO: 0 K/UL
NRBC BLD AUTO-RTO: 0 /100 WBC
PLATELET # BLD AUTO: 173 K/UL (ref 164–446)
PMV BLD AUTO: 11.5 FL (ref 9–12.9)
POTASSIUM SERPL-SCNC: 5.7 MMOL/L (ref 3.6–5.5)
RBC # BLD AUTO: 4.5 M/UL (ref 4.2–5.4)
SODIUM SERPL-SCNC: 128 MMOL/L (ref 135–145)
WBC # BLD AUTO: 9.8 K/UL (ref 4.8–10.8)

## 2017-01-13 PROCEDURE — A9270 NON-COVERED ITEM OR SERVICE: HCPCS | Performed by: INTERNAL MEDICINE

## 2017-01-13 PROCEDURE — 700111 HCHG RX REV CODE 636 W/ 250 OVERRIDE (IP): Performed by: INTERNAL MEDICINE

## 2017-01-13 PROCEDURE — 700102 HCHG RX REV CODE 250 W/ 637 OVERRIDE(OP): Performed by: NEUROLOGICAL SURGERY

## 2017-01-13 PROCEDURE — 5A1D60Z PERFORMANCE OF URINARY FILTRATION, MULTIPLE: ICD-10-PCS | Performed by: INTERNAL MEDICINE

## 2017-01-13 PROCEDURE — 99233 SBSQ HOSP IP/OBS HIGH 50: CPT | Performed by: HOSPITALIST

## 2017-01-13 PROCEDURE — 700102 HCHG RX REV CODE 250 W/ 637 OVERRIDE(OP): Performed by: HOSPITALIST

## 2017-01-13 PROCEDURE — 700112 HCHG RX REV CODE 229: Performed by: HOSPITALIST

## 2017-01-13 PROCEDURE — A9270 NON-COVERED ITEM OR SERVICE: HCPCS | Performed by: HOSPITALIST

## 2017-01-13 PROCEDURE — 80074 ACUTE HEPATITIS PANEL: CPT

## 2017-01-13 PROCEDURE — 700111 HCHG RX REV CODE 636 W/ 250 OVERRIDE (IP): Performed by: HOSPITALIST

## 2017-01-13 PROCEDURE — 36415 COLL VENOUS BLD VENIPUNCTURE: CPT

## 2017-01-13 PROCEDURE — 82962 GLUCOSE BLOOD TEST: CPT

## 2017-01-13 PROCEDURE — 90935 HEMODIALYSIS ONE EVALUATION: CPT

## 2017-01-13 PROCEDURE — A9270 NON-COVERED ITEM OR SERVICE: HCPCS

## 2017-01-13 PROCEDURE — 80048 BASIC METABOLIC PNL TOTAL CA: CPT

## 2017-01-13 PROCEDURE — 700102 HCHG RX REV CODE 250 W/ 637 OVERRIDE(OP)

## 2017-01-13 PROCEDURE — 700102 HCHG RX REV CODE 250 W/ 637 OVERRIDE(OP): Performed by: INTERNAL MEDICINE

## 2017-01-13 PROCEDURE — 770001 HCHG ROOM/CARE - MED/SURG/GYN PRIV*

## 2017-01-13 PROCEDURE — 700111 HCHG RX REV CODE 636 W/ 250 OVERRIDE (IP)

## 2017-01-13 PROCEDURE — 86706 HEP B SURFACE ANTIBODY: CPT

## 2017-01-13 PROCEDURE — 85025 COMPLETE CBC W/AUTO DIFF WBC: CPT

## 2017-01-13 PROCEDURE — A9270 NON-COVERED ITEM OR SERVICE: HCPCS | Performed by: NEUROLOGICAL SURGERY

## 2017-01-13 RX ORDER — HEPARIN SODIUM 1000 [USP'U]/ML
1700 INJECTION, SOLUTION INTRAVENOUS; SUBCUTANEOUS
Status: DISCONTINUED | OUTPATIENT
Start: 2017-01-13 | End: 2017-01-23

## 2017-01-13 RX ORDER — HEPARIN SODIUM 1000 [USP'U]/ML
INJECTION, SOLUTION INTRAVENOUS; SUBCUTANEOUS
Status: COMPLETED
Start: 2017-01-13 | End: 2017-01-13

## 2017-01-13 RX ORDER — HEPARIN SODIUM 1000 [USP'U]/ML
2600 INJECTION, SOLUTION INTRAVENOUS; SUBCUTANEOUS
Status: DISCONTINUED | OUTPATIENT
Start: 2017-01-13 | End: 2017-01-23

## 2017-01-13 RX ADMIN — SIMVASTATIN 10 MG: 20 TABLET, FILM COATED ORAL at 20:22

## 2017-01-13 RX ADMIN — DOCUSATE SODIUM 100 MG: 100 CAPSULE ORAL at 09:07

## 2017-01-13 RX ADMIN — HEPARIN SODIUM 5000 UNITS: 5000 INJECTION INTRAVENOUS; SUBCUTANEOUS at 20:22

## 2017-01-13 RX ADMIN — MORPHINE SULFATE 4 MG: 4 INJECTION INTRAVENOUS at 20:21

## 2017-01-13 RX ADMIN — LEVETIRACETAM 500 MG: 500 TABLET, FILM COATED ORAL at 20:22

## 2017-01-13 RX ADMIN — HEPARIN SODIUM 1700 UNITS: 1000 INJECTION, SOLUTION INTRAVENOUS; SUBCUTANEOUS at 13:27

## 2017-01-13 RX ADMIN — LEVETIRACETAM 500 MG: 500 TABLET, FILM COATED ORAL at 09:07

## 2017-01-13 RX ADMIN — PIOGLITAZONE 30 MG: 30 TABLET ORAL at 09:07

## 2017-01-13 RX ADMIN — DEXAMETHASONE SODIUM PHOSPHATE 4 MG: 4 INJECTION, SOLUTION INTRAMUSCULAR; INTRAVENOUS at 23:31

## 2017-01-13 RX ADMIN — DILTIAZEM HYDROCHLORIDE 240 MG: 240 CAPSULE, COATED, EXTENDED RELEASE ORAL at 09:10

## 2017-01-13 RX ADMIN — INSULIN LISPRO 3 UNITS: 100 INJECTION, SOLUTION INTRAVENOUS; SUBCUTANEOUS at 20:18

## 2017-01-13 RX ADMIN — HEPARIN SODIUM 5000 UNITS: 5000 INJECTION INTRAVENOUS; SUBCUTANEOUS at 05:16

## 2017-01-13 RX ADMIN — METOPROLOL TARTRATE 50 MG: 50 TABLET, FILM COATED ORAL at 09:07

## 2017-01-13 RX ADMIN — INSULIN LISPRO 3 UNITS: 100 INJECTION, SOLUTION INTRAVENOUS; SUBCUTANEOUS at 05:13

## 2017-01-13 RX ADMIN — DEXAMETHASONE SODIUM PHOSPHATE 4 MG: 4 INJECTION, SOLUTION INTRAMUSCULAR; INTRAVENOUS at 00:23

## 2017-01-13 RX ADMIN — DEXAMETHASONE SODIUM PHOSPHATE 4 MG: 4 INJECTION, SOLUTION INTRAMUSCULAR; INTRAVENOUS at 12:07

## 2017-01-13 RX ADMIN — DEXAMETHASONE SODIUM PHOSPHATE 4 MG: 4 INJECTION, SOLUTION INTRAMUSCULAR; INTRAVENOUS at 05:11

## 2017-01-13 RX ADMIN — HEPARIN SODIUM 2600 UNITS: 1000 INJECTION, SOLUTION INTRAVENOUS; SUBCUTANEOUS at 16:06

## 2017-01-13 RX ADMIN — INSULIN LISPRO 3 UNITS: 100 INJECTION, SOLUTION INTRAVENOUS; SUBCUTANEOUS at 18:31

## 2017-01-13 RX ADMIN — METOPROLOL TARTRATE 50 MG: 50 TABLET, FILM COATED ORAL at 20:22

## 2017-01-13 RX ADMIN — DEXAMETHASONE SODIUM PHOSPHATE 4 MG: 4 INJECTION, SOLUTION INTRAMUSCULAR; INTRAVENOUS at 18:32

## 2017-01-13 RX ADMIN — MORPHINE SULFATE 4 MG: 4 INJECTION INTRAVENOUS at 09:12

## 2017-01-13 RX ADMIN — Medication 1 TABLET: at 20:22

## 2017-01-13 RX ADMIN — AMLODIPINE BESYLATE 5 MG: 5 TABLET ORAL at 09:07

## 2017-01-13 RX ADMIN — INSULIN GLARGINE 20 UNITS: 100 INJECTION, SOLUTION SUBCUTANEOUS at 20:18

## 2017-01-13 RX ADMIN — SODIUM BICARBONATE TAB 650 MG 1300 MG: 650 TAB at 09:10

## 2017-01-13 ASSESSMENT — ENCOUNTER SYMPTOMS
COUGH: 0
INSOMNIA: 0
FEVER: 0
SORE THROAT: 0
BACK PAIN: 0
TINGLING: 0
DIZZINESS: 0
NAUSEA: 1
PND: 0
NECK PAIN: 0
DOUBLE VISION: 0
DEPRESSION: 0
BLURRED VISION: 0
CHILLS: 0
PALPITATIONS: 0
ORTHOPNEA: 0
VOMITING: 0
HEADACHES: 0
HEMOPTYSIS: 0

## 2017-01-13 ASSESSMENT — PAIN SCALES - GENERAL
PAINLEVEL_OUTOF10: 6
PAINLEVEL_OUTOF10: 7

## 2017-01-13 NOTE — PROGRESS NOTES
Hospital Medicine Progress Note, Adult, Complex               Author: Tor pro Date & Time created: 1/13/2017  8:50 AM     71 year old female with CKD presented with headache and N/V and was found to have large right temporal intracranial mass, GBM. NSx, Onc and Nephrology following.       Interval History:  Fatigue. Noted labs today and called nephrology for consideration of dialysis.       Review of Systems:  Review of Systems   Constitutional: Positive for malaise/fatigue. Negative for fever and chills.   HENT: Negative for sore throat.    Eyes: Negative for blurred vision and double vision.   Respiratory: Negative for cough and hemoptysis.    Cardiovascular: Negative for palpitations, orthopnea and PND.   Gastrointestinal: Positive for nausea. Negative for vomiting.   Genitourinary: Negative for dysuria and urgency.   Musculoskeletal: Negative for back pain and neck pain.   Skin: Negative for itching and rash.   Neurological: Negative for dizziness, tingling and headaches.   Psychiatric/Behavioral: Negative for depression. The patient does not have insomnia.    All other systems reviewed and are negative.      Physical Exam:  Physical Exam   Constitutional: She is oriented to person, place, and time. She appears well-developed and well-nourished. No distress.   HENT:   Head: Normocephalic and atraumatic.   Right Ear: External ear normal.   Left Ear: External ear normal.   Nose: Nose normal.   Eyes: Right eye exhibits no discharge. Left eye exhibits no discharge. No scleral icterus.   Neck: No JVD present.   Cardiovascular: Normal rate, regular rhythm and normal heart sounds.    No murmur heard.  Pulmonary/Chest: Effort normal and breath sounds normal. No stridor. No respiratory distress. She has no wheezes.   Abdominal: Soft. Bowel sounds are normal. She exhibits no distension. There is no rebound.   Musculoskeletal: Normal range of motion. She exhibits no tenderness.   Neurological: She is alert and  oriented to person, place, and time. She displays normal reflexes. No cranial nerve deficit. She exhibits normal muscle tone.   Skin: Skin is warm and dry. She is not diaphoretic. No erythema.   Psychiatric: She has a normal mood and affect. Her behavior is normal. Thought content normal.   Nursing note and vitals reviewed.      Labs:        Invalid input(s): [id number]      Recent Labs      176   SODIUM  130*  128*   POTASSIUM  5.4  5.7*   CHLORIDE  104  101   CO2  15*  17*   BUN  94*  129*   CREATININE  5.93*  6.57*   CALCIUM  7.5*  8.1*     Recent Labs      17   043   ALTSGPT  <5   --    ASTSGOT  5*   --    ALKPHOSPHAT  58   --    TBILIRUBIN  0.3   --    GLUCOSE  230*  179*     Recent Labs      176   RBC  4.23  4.50   HEMOGLOBIN  12.9  13.7   HEMATOCRIT  40.3  40.7   PLATELETCT  190  173     Recent Labs      176   WBC  10.5  9.8   NEUTSPOLYS  94.80*  90.10*   LYMPHOCYTES  4.30*  5.80*   MONOCYTES  0.90  2.10   EOSINOPHILS  0.00  0.00   BASOPHILS  0.00  0.20   ASTSGOT  5*   --    ALTSGPT  <5   --    ALKPHOSPHAT  58   --    TBILIRUBIN  0.3   --            Hemodynamics:  Temp (24hrs), Av.2 °C (97.1 °F), Min:36 °C (96.8 °F), Max:36.5 °C (97.7 °F)  Temperature: 36 °C (96.8 °F)  Pulse  Av.5  Min: 55  Max: 120Heart Rate (Monitored): 60  Blood Pressure : 133/66 mmHg, NIBP: 155/66 mmHg     Respiratory:    Respiration: 18, Pulse Oximetry: 94 %           Fluids:  No intake or output data in the 24 hours ending 17 0850     GI/Nutrition:  Orders Placed This Encounter   Procedures   • DIET ORDER     Standing Status: Standing      Number of Occurrences: 1      Standing Expiration Date:      Order Specific Question:  Diet:     Answer:  Diabetic [3]     Medical Decision Making, by Problem:  Active Hospital Problems    Diagnosis   • • Intracranial mass [R90.0]   -Likely Glioblastoma per MRI  -planned  surgical resection per  Neurosurgery  -continue Steroids, Keppra  • Acute on chronic renal insufficiency (HCC) [N28.9, N18.9]   -worsening - likely dialysis today. Catheter placed yesterday.   -continue to monitor BMP    Type 2 diabetes mellitus with stage 3 chronic kidney disease (HCC) [E11.22, N18.3]  -uncontrolled likely due to steroids, evidence for previous control. -only 5.9 for HbA1c  -now on Lantus 20 U  -continue ISS with serial accu-checks           • HTN (hypertension) [I10]  -benign, controlled  - toprol xl, cardizem and amlodipine  -Labetalol PRN    hyponatremia- Salt tabs causea vomiting. Stop 3% given dialysis today.     Dicussed plan with RN  dispo- likely snf but not clear yet.         Labs reviewed, Medications reviewed, Radiology images reviewed and EKG reviewed  Hillman catheter: No Hillman      DVT Prophylaxis: Heparin    Ulcer prophylaxis: Not indicated    Assessed for rehab: Patient was assess for and/or received rehabilitation services during this hospitalization

## 2017-01-13 NOTE — PROGRESS NOTES
Assumed pt care at 1900. Received report from day RN. Assessment completed. Pt A&Ox4. Pain 3/10. Bed in lowest position, locked, and bed alarm is on. Pt calls appropriately. Treaded socks in place, call light within reach and staff numbers provided. Pt needs met at this time.

## 2017-01-13 NOTE — PROGRESS NOTES
[facility]phrology note    Name Yina Stephens       1945   Age/Sex 71 y.o. female   MRN [id number]       ID: 71 year old female with PMHx DM type 2, CKD who presented with headache and N/V and was found to have large right temporal intracranial mass on CT head ---> MRI brain reveals large right temporal lobe mass with extensive vasogenic edema most consistent with glioblastoma multiforme. There is R>L midline shift.    Consult to manage ANNABEL on CKD - patient has repeatedly said as outpatient - under no circumstances would she accept dialysis either temporarily or permanently.  HOWEVER - she has recanted that statement to me at this point.    DAILY NEPHROLOGY SUMMARY  17 - seen in consultation by Dr Chua.  ANNABEL felt likely due to pre-renal azotemia from poor intake w Nausea/headaches  17 - renal function not improved - no fluids given overnight - initiating x 1L  1/10/17 - Cr dropped from 5.17 to 5.09. N/V improved as well as headache. K: 5.2. Nearly euvolemic. Better appetite. No indication to start HD now.   17 - MR done again on Khari 10. Nephro team double checked with Radiology office at 4492, the MR was done WITHOUT contrast. No HD today even her Cr is going up. No subjective uremic complaints. Near euvolemic status.   17 - HD catheter done at right IJ. No complication noted.   17 - Elevated Bun/Cr/K. No much subjective uremic symptoms. No symptoms from high K (5.7) 1st HD on .         Interval Problem Daily Status Update    Active Hospital Problems    Diagnosis   • Type 2 diabetes mellitus with stage 3 chronic kidney disease (HCC) [E11.22, N18.3]   • Intracranial mass [R90.0]   • Acute on chronic renal insufficiency (HCC) [N28.9, N18.9]   • HTN (hypertension) [I10]       Past Medical History   Diagnosis Date   • Hiatus hernia syndrome    • Pain    • Renal disorder    • CATARACT        ROS   Constitutional: Denies fevers.   Eyes: Denies  changes in vision, no eye pain  Ears/Nose/Throat/Mouth: Denies nasal congestion or sore throat   Cardiovascular: Denies chest pain or palpitations   Respiratory: Denies shortness of breath , Denies cough  Gastrointestinal/Hepatic: Denies abdominal pain.   Genitourinary: Denies bladder dysfunction, dysuria or frequency  Musculoskeletal/Rheum: No complaints   Skin/Breast: Denies rash   Psychiatric: Denies mood disorder   All other systems were reviewed and are negative (AMA/CMS criteria)    Quality Measures  Core Measures  Per Primary team.   On heparin Ppx.   No anti.   Medication, lab, code status, vitals, images and orders were reviewed.       Physical Exam       Filed Vitals:    01/12/17 1630 01/12/17 2000 01/13/17 0400 01/13/17 0800   BP: 165/65 131/63 128/64 133/66   Pulse: 70 60 63 55   Temp:  36.5 °C (97.7 °F) 36.1 °C (96.9 °F) 36 °C (96.8 °F)   Resp: 16 18 18 18   Height:       Weight:       SpO2:  92% 97% 94%     Body mass index is 27.17 kg/(m^2).    Oxygen Therapy:  Pulse Oximetry: 94 %, O2 (LPM): 2, O2 Delivery: Silicone Nasal Cannula    Physical Exam  HEENT: grossly normal   Cardiovascular: Normal heart rate, Normal rhythm   Lungs: Respiratory effort is normal on low dose N/C O2. Normal breath sounds  Abdomen: Bowel sounds normal, Soft, No tenderness  Skin: No erythema, No rash  Lower limbs: normal, no pitting edema   Neurologic: Alert & oriented x 3, Normal motor function, Normal sensory function, No focal deficits noted, cranial nerves II through XII are normal  PSY: stable mood.         Lab Data Review:      1/10/2017  1:46 PM    Recent Labs      01/11/17   0203  01/13/17   0436   SODIUM  130*  128*   POTASSIUM  5.4  5.7*   CHLORIDE  104  101   CO2  15*  17*   BUN  94*  129*   CREATININE  5.93*  6.57*   CALCIUM  7.5*  8.1*       Recent Labs      01/11/17   0203  01/13/17   0436   ALTSGPT  <5   --    ASTSGOT  5*   --    ALKPHOSPHAT  58   --    TBILIRUBIN  0.3   --    GLUCOSE  230*  179*       Recent  Labs      01/11/17   0203  01/13/17   0436   RBC  4.23  4.50   HEMOGLOBIN  12.9  13.7   HEMATOCRIT  40.3  40.7   PLATELETCT  190  173       Recent Labs      01/11/17   0203  01/13/17   0436   WBC  10.5  9.8   NEUTSPOLYS  94.80*  90.10*   LYMPHOCYTES  4.30*  5.80*   MONOCYTES  0.90  2.10   EOSINOPHILS  0.00  0.00   BASOPHILS  0.00  0.20   ASTSGOT  5*   --    ALTSGPT  <5   --    ALKPHOSPHAT  58   --    TBILIRUBIN  0.3   --            Assessment/Plan   1. ANNABEL on CKD (her baseline Cr in last 6month was 3.7-4)  - Cr 6.6. HD catheter on Jan 12.    - No I/O data from yesterday. Stable BP around 140 systolically. HR around 60  - No recent cardiac echo, last A1C: 5.9 (Jan 2017)  - [place] ultrasound: only one kidney, right kid: 11.75cm, no hydro.   - Protein/Cr >6000, no blood in crystal in UA.   - iron Sat 38, ferrutin 235, not on ferrous. Hgb 13.   - K: 5.7, HCO3: 17. On sodium bicarb. Corrected Ca: ~8.5, PO4:3.7, . Not on PO4 binder now.    Plan:  --> 1st HD on Jan 13  --> Avoid nephro-toxic agents to keep her biologic urine output.    --> Lab follow up.     2. Brain tumor/mass  - On Decadron 4mg Q6, symptoms including Headache and N/V improved.   -->follow Onc, and NeuroSurgry's recs.     3. HTN, chronic  - Around 130-140 systolically. On diltiazem, metoprolol now.     4. HypoNa, 128-132. Mostly likely from SIADH.    Plan:  Follow Na after dialysis.     5. DM II, with A1C 5.9. Continue current management.   6. Mild Anemia, might be related to CKD, current Hgb at 13 with good Sat at 38. No intervention for now.

## 2017-01-13 NOTE — CARE PLAN
Problem: Pain Management  Goal: Pain level will decrease to patient’s comfort goal  Outcome: PROGRESSING AS EXPECTED    Problem: Safety  Goal: Will remain free from falls  Outcome: PROGRESSING AS EXPECTED

## 2017-01-14 LAB
ANION GAP SERPL CALC-SCNC: 9 MMOL/L (ref 0–11.9)
BASOPHILS # BLD AUTO: 0.2 % (ref 0–1.8)
BASOPHILS # BLD: 0.03 K/UL (ref 0–0.12)
BUN SERPL-MCNC: 97 MG/DL (ref 8–22)
CALCIUM SERPL-MCNC: 8.6 MG/DL (ref 8.5–10.5)
CHLORIDE SERPL-SCNC: 101 MMOL/L (ref 96–112)
CO2 SERPL-SCNC: 22 MMOL/L (ref 20–33)
CREAT SERPL-MCNC: 5.3 MG/DL (ref 0.5–1.4)
EOSINOPHIL # BLD AUTO: 0 K/UL (ref 0–0.51)
EOSINOPHIL NFR BLD: 0 % (ref 0–6.9)
ERYTHROCYTE [DISTWIDTH] IN BLOOD BY AUTOMATED COUNT: 44.4 FL (ref 35.9–50)
GFR SERPL CREATININE-BSD FRML MDRD: 8 ML/MIN/1.73 M 2
GLUCOSE BLD-MCNC: 103 MG/DL (ref 65–99)
GLUCOSE BLD-MCNC: 106 MG/DL (ref 65–99)
GLUCOSE BLD-MCNC: 134 MG/DL (ref 65–99)
GLUCOSE BLD-MCNC: 135 MG/DL (ref 65–99)
GLUCOSE SERPL-MCNC: 127 MG/DL (ref 65–99)
HCT VFR BLD AUTO: 40.6 % (ref 37–47)
HGB BLD-MCNC: 14 G/DL (ref 12–16)
IMM GRANULOCYTES # BLD AUTO: 0.16 K/UL (ref 0–0.11)
IMM GRANULOCYTES NFR BLD AUTO: 1.2 % (ref 0–0.9)
LYMPHOCYTES # BLD AUTO: 0.55 K/UL (ref 1–4.8)
LYMPHOCYTES NFR BLD: 4 % (ref 22–41)
MCH RBC QN AUTO: 30.6 PG (ref 27–33)
MCHC RBC AUTO-ENTMCNC: 34.5 G/DL (ref 33.6–35)
MCV RBC AUTO: 88.6 FL (ref 81.4–97.8)
MONOCYTES # BLD AUTO: 0.67 K/UL (ref 0–0.85)
MONOCYTES NFR BLD AUTO: 4.9 % (ref 0–13.4)
NEUTROPHILS # BLD AUTO: 12.21 K/UL (ref 2–7.15)
NEUTROPHILS NFR BLD: 89.7 % (ref 44–72)
NRBC # BLD AUTO: 0 K/UL
NRBC BLD AUTO-RTO: 0 /100 WBC
PLATELET # BLD AUTO: 146 K/UL (ref 164–446)
PMV BLD AUTO: 11.3 FL (ref 9–12.9)
POTASSIUM SERPL-SCNC: 5.1 MMOL/L (ref 3.6–5.5)
RBC # BLD AUTO: 4.58 M/UL (ref 4.2–5.4)
SODIUM SERPL-SCNC: 132 MMOL/L (ref 135–145)
WBC # BLD AUTO: 13.6 K/UL (ref 4.8–10.8)

## 2017-01-14 PROCEDURE — A9270 NON-COVERED ITEM OR SERVICE: HCPCS | Performed by: HOSPITALIST

## 2017-01-14 PROCEDURE — 700112 HCHG RX REV CODE 229: Performed by: HOSPITALIST

## 2017-01-14 PROCEDURE — 700102 HCHG RX REV CODE 250 W/ 637 OVERRIDE(OP)

## 2017-01-14 PROCEDURE — A9270 NON-COVERED ITEM OR SERVICE: HCPCS

## 2017-01-14 PROCEDURE — 700111 HCHG RX REV CODE 636 W/ 250 OVERRIDE (IP): Performed by: HOSPITALIST

## 2017-01-14 PROCEDURE — 85025 COMPLETE CBC W/AUTO DIFF WBC: CPT

## 2017-01-14 PROCEDURE — 700111 HCHG RX REV CODE 636 W/ 250 OVERRIDE (IP): Performed by: INTERNAL MEDICINE

## 2017-01-14 PROCEDURE — 700102 HCHG RX REV CODE 250 W/ 637 OVERRIDE(OP): Performed by: INTERNAL MEDICINE

## 2017-01-14 PROCEDURE — 700102 HCHG RX REV CODE 250 W/ 637 OVERRIDE(OP): Performed by: NEUROLOGICAL SURGERY

## 2017-01-14 PROCEDURE — 700102 HCHG RX REV CODE 250 W/ 637 OVERRIDE(OP): Performed by: HOSPITALIST

## 2017-01-14 PROCEDURE — A9270 NON-COVERED ITEM OR SERVICE: HCPCS | Performed by: INTERNAL MEDICINE

## 2017-01-14 PROCEDURE — 82962 GLUCOSE BLOOD TEST: CPT | Mod: 91

## 2017-01-14 PROCEDURE — 770001 HCHG ROOM/CARE - MED/SURG/GYN PRIV*

## 2017-01-14 PROCEDURE — 700111 HCHG RX REV CODE 636 W/ 250 OVERRIDE (IP)

## 2017-01-14 PROCEDURE — 80048 BASIC METABOLIC PNL TOTAL CA: CPT

## 2017-01-14 PROCEDURE — A9270 NON-COVERED ITEM OR SERVICE: HCPCS | Performed by: NEUROLOGICAL SURGERY

## 2017-01-14 PROCEDURE — 99233 SBSQ HOSP IP/OBS HIGH 50: CPT | Performed by: HOSPITALIST

## 2017-01-14 PROCEDURE — 90935 HEMODIALYSIS ONE EVALUATION: CPT

## 2017-01-14 PROCEDURE — 700111 HCHG RX REV CODE 636 W/ 250 OVERRIDE (IP): Performed by: PHYSICIAN ASSISTANT

## 2017-01-14 RX ORDER — HYDRALAZINE HYDROCHLORIDE 10 MG/1
50 TABLET, FILM COATED ORAL EVERY 8 HOURS
Status: DISCONTINUED | OUTPATIENT
Start: 2017-01-14 | End: 2017-01-26

## 2017-01-14 RX ORDER — HEPARIN SODIUM 1000 [USP'U]/ML
INJECTION, SOLUTION INTRAVENOUS; SUBCUTANEOUS
Status: COMPLETED
Start: 2017-01-14 | End: 2017-01-14

## 2017-01-14 RX ADMIN — MORPHINE SULFATE 4 MG: 4 INJECTION INTRAVENOUS at 20:28

## 2017-01-14 RX ADMIN — SIMVASTATIN 10 MG: 20 TABLET, FILM COATED ORAL at 20:23

## 2017-01-14 RX ADMIN — LEVETIRACETAM 500 MG: 500 TABLET, FILM COATED ORAL at 20:23

## 2017-01-14 RX ADMIN — INSULIN GLARGINE 20 UNITS: 100 INJECTION, SOLUTION SUBCUTANEOUS at 20:24

## 2017-01-14 RX ADMIN — LEVETIRACETAM 500 MG: 500 TABLET, FILM COATED ORAL at 08:47

## 2017-01-14 RX ADMIN — DEXAMETHASONE SODIUM PHOSPHATE 4 MG: 4 INJECTION, SOLUTION INTRAMUSCULAR; INTRAVENOUS at 05:53

## 2017-01-14 RX ADMIN — HEPARIN SODIUM 5000 UNITS: 5000 INJECTION INTRAVENOUS; SUBCUTANEOUS at 13:52

## 2017-01-14 RX ADMIN — HEPARIN SODIUM 1700 UNITS: 1000 INJECTION, SOLUTION INTRAVENOUS; SUBCUTANEOUS at 15:00

## 2017-01-14 RX ADMIN — Medication 1 TABLET: at 20:23

## 2017-01-14 RX ADMIN — HEPARIN SODIUM 5000 UNITS: 5000 INJECTION INTRAVENOUS; SUBCUTANEOUS at 06:00

## 2017-01-14 RX ADMIN — DEXAMETHASONE SODIUM PHOSPHATE 4 MG: 4 INJECTION, SOLUTION INTRAMUSCULAR; INTRAVENOUS at 11:28

## 2017-01-14 RX ADMIN — HYDRALAZINE HYDROCHLORIDE 50 MG: 50 TABLET ORAL at 20:23

## 2017-01-14 RX ADMIN — MORPHINE SULFATE 2 MG: 4 INJECTION INTRAVENOUS at 02:21

## 2017-01-14 RX ADMIN — SODIUM BICARBONATE TAB 650 MG 1300 MG: 650 TAB at 08:47

## 2017-01-14 RX ADMIN — Medication: at 20:30

## 2017-01-14 RX ADMIN — METOPROLOL TARTRATE 50 MG: 50 TABLET, FILM COATED ORAL at 20:23

## 2017-01-14 RX ADMIN — DOCUSATE SODIUM 100 MG: 100 CAPSULE ORAL at 08:47

## 2017-01-14 RX ADMIN — ACETAMINOPHEN 650 MG: 325 TABLET, FILM COATED ORAL at 11:27

## 2017-01-14 RX ADMIN — HEPARIN SODIUM 2600 UNITS: 1000 INJECTION, SOLUTION INTRAVENOUS; SUBCUTANEOUS at 17:36

## 2017-01-14 RX ADMIN — PIOGLITAZONE 30 MG: 30 TABLET ORAL at 08:47

## 2017-01-14 RX ADMIN — MORPHINE SULFATE 4 MG: 4 INJECTION INTRAVENOUS at 08:47

## 2017-01-14 RX ADMIN — HEPARIN SODIUM 5000 UNITS: 5000 INJECTION INTRAVENOUS; SUBCUTANEOUS at 20:29

## 2017-01-14 RX ADMIN — HYDRALAZINE HYDROCHLORIDE 50 MG: 50 TABLET ORAL at 13:53

## 2017-01-14 RX ADMIN — AMLODIPINE BESYLATE 5 MG: 5 TABLET ORAL at 08:47

## 2017-01-14 RX ADMIN — METOPROLOL TARTRATE 50 MG: 50 TABLET, FILM COATED ORAL at 11:27

## 2017-01-14 RX ADMIN — ACETAMINOPHEN 650 MG: 325 TABLET, FILM COATED ORAL at 05:57

## 2017-01-14 RX ADMIN — DEXAMETHASONE SODIUM PHOSPHATE 4 MG: 4 INJECTION, SOLUTION INTRAMUSCULAR; INTRAVENOUS at 18:20

## 2017-01-14 ASSESSMENT — ENCOUNTER SYMPTOMS
TINGLING: 0
WEAKNESS: 1
NAUSEA: 1
SHORTNESS OF BREATH: 0
NAUSEA: 0
HEARTBURN: 0
INSOMNIA: 0
FEVER: 0
VOMITING: 0
DIZZINESS: 0
DOUBLE VISION: 0
HEADACHES: 1
CHILLS: 0
ORTHOPNEA: 0
DEPRESSION: 0
BACK PAIN: 0
HEMOPTYSIS: 0
COUGH: 0
BLURRED VISION: 0
ABDOMINAL PAIN: 0
HEADACHES: 0
PALPITATIONS: 0
NECK PAIN: 0
PHOTOPHOBIA: 0
PND: 0

## 2017-01-14 ASSESSMENT — PAIN SCALES - GENERAL
PAINLEVEL_OUTOF10: 7
PAINLEVEL_OUTOF10: 8
PAINLEVEL_OUTOF10: 3
PAINLEVEL_OUTOF10: 8

## 2017-01-14 NOTE — PROGRESS NOTES
[facility] Progress Note    Nephrologist ordered hemodialysis 2.5 hour tx at 1332 and terminated at 1605.    Net UF 1000 mL.    Condition unchanged from baseline and stable. CVC intact, patent, flushed with NS and locked with heparin 1000 units per mL as designated per each wing, then clamped and capped. Aspirate heparin prior to next CVC use. Pt tolerated tx.    Report given to primary RN,    See flow sheet for details.

## 2017-01-14 NOTE — PROGRESS NOTES
Assumed pt care at 1900. Received report from day RN. Assessment completed. Pt A&Ox4. Pain 7/10. Bed in lowest position, locked, and bed alarm is on. Pt calls appropriately. Treaded socks in place, call light within reach and staff numbers provided. Pt needs met at this time.

## 2017-01-14 NOTE — PROGRESS NOTES
Hospital Medicine Progress Note, Adult, Complex               Author: Tor pro Date & Time created: 1/14/2017  8:17 AM     71 year old female with CKD presented with headache and N/V and was found to have large right temporal intracranial mass, GBM. NSx, Onc and Nephrology following.       Interval History:  Tired today. Had pain and bradycardia over night. Morphine apparently not given 2/2 ??bradycardia per family??. Complains of itching.         Review of Systems:  Review of Systems   Constitutional: Positive for malaise/fatigue.   HENT: Negative for hearing loss.    Eyes: Negative for double vision and photophobia.   Respiratory: Negative for cough and hemoptysis.    Cardiovascular: Negative for palpitations and orthopnea.   Gastrointestinal: Negative for nausea and vomiting.   Genitourinary: Negative for dysuria and urgency.   Musculoskeletal: Negative for back pain and neck pain.   Skin: Negative for itching and rash.   Neurological: Positive for weakness and headaches. Negative for dizziness and tingling.   Psychiatric/Behavioral: Negative for depression. The patient does not have insomnia.    All other systems reviewed and are negative.      Physical Exam:  Physical Exam   Constitutional: She is oriented to person, place, and time. She appears well-developed and well-nourished. No distress.   HENT:   Right Ear: External ear normal.   Left Ear: External ear normal.   Nose: Nose normal.   Right ij dialysis cath   Eyes: Conjunctivae are normal. No scleral icterus.   Neck: No JVD present.   Cardiovascular: Normal rate, regular rhythm and normal heart sounds.    No murmur heard.  Pulmonary/Chest: Effort normal. No stridor. She has no wheezes. She has no rales.   Abdominal: Soft. Bowel sounds are normal. She exhibits no distension. There is no rebound.   Musculoskeletal: Normal range of motion. She exhibits no tenderness.   Neurological: She is alert and oriented to person, place, and time. She displays  normal reflexes. No cranial nerve deficit. She exhibits normal muscle tone.   Skin: Skin is warm and dry. She is not diaphoretic. No erythema.   Psychiatric: She has a normal mood and affect. Her behavior is normal.   Nursing note and vitals reviewed.      Labs:        Invalid input(s): [id number]      Recent Labs      17   SODIUM  128*   POTASSIUM  5.7*   CHLORIDE  101   CO2  17*   BUN  129*   CREATININE  6.57*   CALCIUM  8.1*     Recent Labs      17   043   GLUCOSE  179*     Recent Labs      17   0245   RBC  4.50  4.58   HEMOGLOBIN  13.7  14.0   HEMATOCRIT  40.7  40.6   PLATELETCT  173  146*     Recent Labs      17   024   WBC  9.8  13.6*   NEUTSPOLYS  90.10*  89.70*   LYMPHOCYTES  5.80*  4.00*   MONOCYTES  2.10  4.90   EOSINOPHILS  0.00  0.00   BASOPHILS  0.20  0.20           Hemodynamics:  Temp (24hrs), Av.9 °C (96.6 °F), Min:35.7 °C (96.2 °F), Max:36.1 °C (97 °F)  Temperature: 36.1 °C (97 °F)  Pulse  Av.9  Min: 52  Max: 120   Blood Pressure : (!) 172/60 mmHg     Respiratory:    Respiration: 18, Pulse Oximetry: 95 %     Work Of Breathing / Effort: Mild  RUL Breath Sounds: Clear, RML Breath Sounds: Clear, RLL Breath Sounds: Clear, SOURAV Breath Sounds: Clear, LLL Breath Sounds: Clear  Fluids:    Intake/Output Summary (Last 24 hours) at 17 0817  Last data filed at 17 0600   Gross per 24 hour   Intake    700 ml   Output   1500 ml   Net   -800 ml        GI/Nutrition:  Orders Placed This Encounter   Procedures   • DIET ORDER     Standing Status: Standing      Number of Occurrences: 1      Standing Expiration Date:      Order Specific Question:  Diet:     Answer:  Diabetic [3]     Medical Decision Making, by Problem:  Active Hospital Problems    Diagnosis   • • Intracranial mass [R90.0]   -Likely Glioblastoma per MRI  -planned surgical resection tomorrow per  Neurosurgery  -continue Steroids, Keppra, pain control.   - note  morphine ok for pain control. This should not affect heart rate. Bp stable. Discussed with RN  • Acute on chronic renal insufficiency (HCC) [N28.9, N18.9]   -worsening - s/p 1 round dialysis yesterday. nephro following. Bicarb for metabolic acidosis 2/2 renal failure.     Type 2 diabetes mellitus -uncontrolled likely due to steroids, evidence for previous control. -only 5.9 for HbA1c  -now on Lantus 20 U  -continue ISS with serial accu-checks           • HTN (hypertension) [I10]  -benign, controlled but bradycardia noted at night. Will d/c cardizem and contine toprol. Add hydralazine and continue norvasc.      hyponatremia- Salt tabs causea vomiting. Stop 3% given dialysis today.     Benadryl cream for itching- possible uremia related.       Dicussed plan with RN  dispo- likely snf but not clear yet.           Labs reviewed, Medications reviewed, Radiology images reviewed and EKG reviewed  Hillman catheter: No Hillman      DVT Prophylaxis: Heparin    Ulcer prophylaxis: Not indicated    Assessed for rehab: Patient was assess for and/or received rehabilitation services during this hospitalization

## 2017-01-14 NOTE — PROGRESS NOTES
[facility] Nephrology Progress Note               Author: Nikolay Pierce MD Date & Time created: 1/14/2017  10:39 AM     Interval History:  71 year old female with PMHx DM type 2, CKD who presented with headache and N/V and was found to have large right temporal intracranial mass on CT head ---> MRI brain reveals large right temporal lobe mass with extensive vasogenic edema most consistent with glioblastoma multiforme. There is R>L midline shift.    Consult to manage ANNABEL on CKD - patient has repeatedly said as outpatient - under no circumstances would she accept dialysis either temporarily or permanently.  HOWEVER - she has recanted that statement to me at this point.    DAILY NEPHROLOGY SUMMARY  1/08/17 - seen in consultation by Dr Chua.  ANNABEL felt likely due to pre-renal azotemia from poor intake w Nausea/headaches  1/09/17 - renal function not improved - no fluids given overnight - initiating x 1L  1/10/17 - Cr dropped from 5.17 to 5.09. K: 5.2. Nearly euvolemic. Better appetite. No indication to start HD now.    1/11/17 - MR done again on Khari 10. Nephro team double checked with Radiology office at [phone], the MR was done WITHOUT contrast. No HD today even her Cr is going up. No subjective uremic complaints. Near euvolemic status.    1/12/17 - HD catheter done at right IJ. No complication noted.    1/13/17 - Elevated Bun/Cr/K. No much subjective uremic symptoms. No symptoms from high K (5.7) 1st HD on Jan 13.   1/14/17 - s/p HD #1 yesterday without complication  Running again today in preparation for surgery tomorrow      Review of Systems   Constitutional: Negative for fever and chills.   Eyes: Negative for blurred vision and double vision.   Respiratory: Negative for cough and shortness of breath.    Cardiovascular: Negative for chest pain and PND.   Gastrointestinal: Positive for nausea. Negative for heartburn, vomiting and abdominal pain.   Genitourinary: Negative for dysuria.   Neurological: Negative  for dizziness and headaches.   All other systems reviewed and are negative.      Physical Exam   Constitutional: She is oriented to person, place, and time. She appears well-developed and well-nourished.   HENT:   Head: Normocephalic and atraumatic.   Eyes: Conjunctivae are normal.   Cardiovascular: Normal rate and regular rhythm.    Pulmonary/Chest: Effort normal and breath sounds normal. No respiratory distress. She has no wheezes. She has no rales.   Abdominal: Soft. Bowel sounds are normal. She exhibits no distension. There is no tenderness.   Musculoskeletal: Normal range of motion.   Neurological: She is alert and oriented to person, place, and time. No cranial nerve deficit. Coordination normal.   Psychiatric: She has a normal mood and affect. Her behavior is normal.   Nursing note and vitals reviewed.      Labs:        Invalid input(s): [id number]      Recent Labs      176  17   0859   SODIUM  128*  132*   POTASSIUM  5.7*  5.1   CHLORIDE  101  101   CO2  17*  22   BUN  129*  97*   CREATININE  6.57*  5.30*   CALCIUM  8.1*  8.6     Recent Labs      176  17   0859   GLUCOSE  179*  127*     Recent Labs      17   0436  17   0245   RBC  4.50  4.58   HEMOGLOBIN  13.7  14.0   HEMATOCRIT  40.7  40.6   PLATELETCT  173  146*     Recent Labs      17   0436  17   0245   WBC  9.8  13.6*   NEUTSPOLYS  90.10*  89.70*   LYMPHOCYTES  5.80*  4.00*   MONOCYTES  2.10  4.90   EOSINOPHILS  0.00  0.00   BASOPHILS  0.20  0.20           Hemodynamics:  Temp (24hrs), Av.9 °C (96.6 °F), Min:35.7 °C (96.2 °F), Max:36.1 °C (97 °F)  Temperature: 36.1 °C (97 °F)  Pulse  Av.9  Min: 52  Max: 120   Blood Pressure : (!) 172/60 mmHg     Respiratory:    Respiration: 18, Pulse Oximetry: 96 %     Work Of Breathing / Effort: Mild  RUL Breath Sounds: Clear, RML Breath Sounds: Clear, RLL Breath Sounds: Clear, SOURAV Breath Sounds: Clear, LLL Breath Sounds:  Clear  Fluids:    Intake/Output Summary (Last 24 hours) at 01/14/17 1039  Last data filed at 01/14/17 0600   Gross per 24 hour   Intake    700 ml   Output   1500 ml   Net   -800 ml        GI/Nutrition:  Orders Placed This Encounter   Procedures   • DIET ORDER     Standing Status: Standing      Number of Occurrences: 1      Standing Expiration Date:      Order Specific Question:  Diet:     Answer:  Diabetic [3]     Medical Decision Making, by Problem:  Active Hospital Problems    Diagnosis       Assessment/Plan:  # NEW ESRD  ---- s/p placement of dialysis catheter/Permcath on 1/12/17   -- s/p initiation of hemodialysis on 1/13/17  -- BUN down some today - will run again today then hold tomorrow for surgery    # Intracranial Mass  --MRI shows large heterogeneous partly hemorrhagic R temporal lobe mass with extensive vasogenic edema and midline shift consistent with glioblastoma multiforme  --On decadron  --Neurosurgery following - to OR on 1/15/17 planned  --Oncology Dr. aSlomon also following    # HTN  --sub-optimal control  --Continue home BP meds  --Started norvasc 5mg daily on 1/12/17 - need to wait 5 days before adding more    # DM II--management per primary svc  # Anemia--secondary to CKD, no need for TANISHA  # Metabolic Acidosis--start PO bicarb supplements        Labs reviewed, Medications reviewed and Radiology images reviewed        DVT Prophylaxis: Heparin

## 2017-01-14 NOTE — DISCHARGE PLANNING
Current documentation reveals a potential for acute inpatient rehabilitation, please consider a rehab consult to assist with discharge planning.

## 2017-01-14 NOTE — PROGRESS NOTES
Progress Note               Author: Renu Jauregui Date & Time created: 2017  11:08 AM     Interval History:  Pt stable  Headache and nausea improved  ESRD-HD yesterday and today; cleared for surgery  CR-5.3      Review of Systems:  Review of Systems   Neurological: Positive for headaches.       Physical Exam:  Physical Exam   Constitutional: She is oriented to person, place, and time.   Neurological: She is alert and oriented to person, place, and time.   Alert to person and place only   Slight left generalized leg weakness  CN 2-12 grossly intact   No drift.        Labs:        Invalid input(s): [id number]      Recent Labs      17   0436  17   0859   SODIUM  128*  132*   POTASSIUM  5.7*  5.1   CHLORIDE  101  101   CO2  17*  22   BUN  129*  97*   CREATININE  6.57*  5.30*   CALCIUM  8.1*  8.6     Recent Labs      17   0436  17   0859   GLUCOSE  179*  127*     Recent Labs      17   0436  17   0245   RBC  4.50  4.58   HEMOGLOBIN  13.7  14.0   HEMATOCRIT  40.7  40.6   PLATELETCT  173  146*     Recent Labs      17   0436  17   0245   WBC  9.8  13.6*   NEUTSPOLYS  90.10*  89.70*   LYMPHOCYTES  5.80*  4.00*   MONOCYTES  2.10  4.90   EOSINOPHILS  0.00  0.00   BASOPHILS  0.20  0.20     Hemodynamics:  Temp (24hrs), Av.9 °C (96.6 °F), Min:35.7 °C (96.2 °F), Max:36.1 °C (97 °F)  Temperature: 36.1 °C (97 °F)  Pulse  Av.9  Min: 52  Max: 120   Blood Pressure : (!) 172/60 mmHg     Respiratory:    Respiration: 18, Pulse Oximetry: 96 %     Work Of Breathing / Effort: Mild  RUL Breath Sounds: Clear, RML Breath Sounds: Clear, RLL Breath Sounds: Clear, SOURAV Breath Sounds: Clear, LLL Breath Sounds: Clear  Fluids:  No intake or output data in the 24 hours ending 17 0835     GI/Nutrition:  Orders Placed This Encounter   Procedures   • DIET ORDER     Standing Status: Standing      Number of Occurrences: 1      Standing Expiration Date:      Order Specific Question:   Diet:     Answer:  Diabetic [3]   • DIET NPO     Standing Status: Standing      Number of Occurrences: 1      Standing Expiration Date:      Order Specific Question:  Restrict to:     Answer:  Sips with Medications [3]     Medical Decision Making, by Problem:  Active Hospital Problems    Diagnosis   • Intracranial mass [R90.0]       Plan:  Continue IM/Onc care  Continue steroids and Keppra   NPO after midnight  Consent      Labs reviewed, Radiology images reviewed and Medications reviewed

## 2017-01-14 NOTE — DISCHARGE PLANNING
SW left message for on-call jyoti Johnston ([phone]) requesting visit for pt before her surgery tomorrow as requested by family. Awaiting return call with estimate on when Father Preston will be coming to see the pt.

## 2017-01-14 NOTE — CARE PLAN
Problem: Pain Management  Goal: Pain level will decrease to patient’s comfort goal  Outcome: PROGRESSING AS EXPECTED    Problem: Fluid Volume:  Goal: Will maintain balanced intake and output  Outcome: PROGRESSING AS EXPECTED

## 2017-01-15 ENCOUNTER — APPOINTMENT (OUTPATIENT)
Dept: RADIOLOGY | Facility: MEDICAL CENTER | Age: 72
DRG: 025 | End: 2017-01-15
Attending: NEUROLOGICAL SURGERY
Payer: MEDICARE

## 2017-01-15 ENCOUNTER — APPOINTMENT (OUTPATIENT)
Dept: RADIOLOGY | Facility: MEDICAL CENTER | Age: 72
DRG: 025 | End: 2017-01-15
Attending: PHYSICIAN ASSISTANT
Payer: MEDICARE

## 2017-01-15 LAB
ABO GROUP BLD: NORMAL
ABO GROUP BLD: NORMAL
ANION GAP SERPL CALC-SCNC: 11 MMOL/L (ref 0–11.9)
ANION GAP SERPL CALC-SCNC: 9 MMOL/L (ref 0–11.9)
BASE EXCESS BLDA CALC-SCNC: -1 MMOL/L (ref -4–3)
BLD GP AB SCN SERPL QL: NORMAL
BODY TEMPERATURE: ABNORMAL DEGREES
BUN SERPL-MCNC: 72 MG/DL (ref 8–22)
BUN SERPL-MCNC: 84 MG/DL (ref 8–22)
CA-I BLD ISE-SCNC: 1.15 MMOL/L (ref 1.1–1.3)
CALCIUM SERPL-MCNC: 8.3 MG/DL (ref 8.5–10.5)
CALCIUM SERPL-MCNC: 8.5 MG/DL (ref 8.5–10.5)
CHLORIDE SERPL-SCNC: 99 MMOL/L (ref 96–112)
CHLORIDE SERPL-SCNC: 99 MMOL/L (ref 96–112)
CO2 BLDA-SCNC: 24 MMOL/L (ref 20–33)
CO2 SERPL-SCNC: 20 MMOL/L (ref 20–33)
CO2 SERPL-SCNC: 23 MMOL/L (ref 20–33)
CREAT SERPL-MCNC: 4.46 MG/DL (ref 0.5–1.4)
CREAT SERPL-MCNC: 4.83 MG/DL (ref 0.5–1.4)
ERYTHROCYTE [DISTWIDTH] IN BLOOD BY AUTOMATED COUNT: 46.5 FL (ref 35.9–50)
GFR SERPL CREATININE-BSD FRML MDRD: 10 ML/MIN/1.73 M 2
GFR SERPL CREATININE-BSD FRML MDRD: 9 ML/MIN/1.73 M 2
GLUCOSE BLD-MCNC: 126 MG/DL (ref 65–99)
GLUCOSE BLD-MCNC: 142 MG/DL (ref 65–99)
GLUCOSE BLD-MCNC: 143 MG/DL (ref 65–99)
GLUCOSE BLD-MCNC: 200 MG/DL (ref 65–99)
GLUCOSE BLD-MCNC: 207 MG/DL (ref 65–99)
GLUCOSE BLD-MCNC: 218 MG/DL (ref 65–99)
GLUCOSE SERPL-MCNC: 175 MG/DL (ref 65–99)
GLUCOSE SERPL-MCNC: 253 MG/DL (ref 65–99)
HBV SURFACE AG SER QL: NEGATIVE
HCO3 BLDA-SCNC: 22.5 MMOL/L (ref 17–25)
HCT VFR BLD AUTO: 39.1 % (ref 37–47)
HCT VFR BLD CALC: 41 % (ref 37–47)
HGB BLD-MCNC: 13.4 G/DL (ref 12–16)
HGB BLD-MCNC: 13.9 G/DL (ref 12–16)
MCH RBC QN AUTO: 30.6 PG (ref 27–33)
MCHC RBC AUTO-ENTMCNC: 34.3 G/DL (ref 33.6–35)
MCV RBC AUTO: 89.3 FL (ref 81.4–97.8)
O2/TOTAL GAS SETTING VFR VENT: 0.5 %
PCO2 BLDA: 34.1 MMHG (ref 26–37)
PCO2 TEMP ADJ BLDA: 32.9 MMHG (ref 26–37)
PH BLDA: 7.43 [PH] (ref 7.4–7.5)
PH TEMP ADJ BLDA: 7.44 [PH] (ref 7.4–7.5)
PLATELET # BLD AUTO: 168 K/UL (ref 164–446)
PMV BLD AUTO: 11.9 FL (ref 9–12.9)
PO2 BLDA: 103 MMHG (ref 64–87)
PO2 TEMP ADJ BLDA: 98 MMHG (ref 64–87)
POTASSIUM BLD-SCNC: 5.3 MMOL/L (ref 3.6–5.5)
POTASSIUM SERPL-SCNC: 5.2 MMOL/L (ref 3.6–5.5)
POTASSIUM SERPL-SCNC: 5.3 MMOL/L (ref 3.6–5.5)
RBC # BLD AUTO: 4.38 M/UL (ref 4.2–5.4)
RH BLD: NORMAL
SAO2 % BLDA: 98 % (ref 93–99)
SODIUM BLD-SCNC: 135 MMOL/L (ref 135–145)
SODIUM SERPL-SCNC: 130 MMOL/L (ref 135–145)
SODIUM SERPL-SCNC: 131 MMOL/L (ref 135–145)
SPECIMEN DRAWN FROM PATIENT: ABNORMAL
WBC # BLD AUTO: 26.4 K/UL (ref 4.8–10.8)

## 2017-01-15 PROCEDURE — 500912 HCHG PERFORATOR, DISP TIP: Performed by: NEUROLOGICAL SURGERY

## 2017-01-15 PROCEDURE — 501290 HCHG SCREW, STRYK 1.7: Performed by: NEUROLOGICAL SURGERY

## 2017-01-15 PROCEDURE — 500367 HCHG DRAIN KIT, HEMOVAC: Performed by: NEUROLOGICAL SURGERY

## 2017-01-15 PROCEDURE — 160009 HCHG ANES TIME/MIN: Performed by: NEUROLOGICAL SURGERY

## 2017-01-15 PROCEDURE — 700111 HCHG RX REV CODE 636 W/ 250 OVERRIDE (IP): Performed by: PHYSICIAN ASSISTANT

## 2017-01-15 PROCEDURE — 86900 BLOOD TYPING SEROLOGIC ABO: CPT

## 2017-01-15 PROCEDURE — 160036 HCHG PACU - EA ADDL 30 MINS PHASE I: Performed by: NEUROLOGICAL SURGERY

## 2017-01-15 PROCEDURE — 501445 HCHG STAPLER, SKIN DISP: Performed by: NEUROLOGICAL SURGERY

## 2017-01-15 PROCEDURE — 84295 ASSAY OF SERUM SODIUM: CPT

## 2017-01-15 PROCEDURE — C1713 ANCHOR/SCREW BN/BN,TIS/BN: HCPCS | Performed by: NEUROLOGICAL SURGERY

## 2017-01-15 PROCEDURE — 85014 HEMATOCRIT: CPT

## 2017-01-15 PROCEDURE — 86707 HEPATITIS BE ANTIBODY: CPT

## 2017-01-15 PROCEDURE — 500889 HCHG PACK, NEURO: Performed by: NEUROLOGICAL SURGERY

## 2017-01-15 PROCEDURE — 700111 HCHG RX REV CODE 636 W/ 250 OVERRIDE (IP): Performed by: NEUROLOGICAL SURGERY

## 2017-01-15 PROCEDURE — 82947 ASSAY GLUCOSE BLOOD QUANT: CPT

## 2017-01-15 PROCEDURE — 700111 HCHG RX REV CODE 636 W/ 250 OVERRIDE (IP): Performed by: HOSPITALIST

## 2017-01-15 PROCEDURE — 501422 HCHG SPONGE, SURGIFOAM 100: Performed by: NEUROLOGICAL SURGERY

## 2017-01-15 PROCEDURE — 110371 HCHG SHELL REV 272: Performed by: NEUROLOGICAL SURGERY

## 2017-01-15 PROCEDURE — 700111 HCHG RX REV CODE 636 W/ 250 OVERRIDE (IP)

## 2017-01-15 PROCEDURE — 81001 URINALYSIS AUTO W/SCOPE: CPT

## 2017-01-15 PROCEDURE — 86480 TB TEST CELL IMMUN MEASURE: CPT

## 2017-01-15 PROCEDURE — 88307 TISSUE EXAM BY PATHOLOGIST: CPT

## 2017-01-15 PROCEDURE — 99232 SBSQ HOSP IP/OBS MODERATE 35: CPT | Performed by: HOSPITALIST

## 2017-01-15 PROCEDURE — A4606 OXYGEN PROBE USED W OXIMETER: HCPCS | Performed by: NEUROLOGICAL SURGERY

## 2017-01-15 PROCEDURE — 500094 HCHG BONE CEMENT MIXER (MIX-E-VAC II): Performed by: NEUROLOGICAL SURGERY

## 2017-01-15 PROCEDURE — 501200: Performed by: NEUROLOGICAL SURGERY

## 2017-01-15 PROCEDURE — 82330 ASSAY OF CALCIUM: CPT

## 2017-01-15 PROCEDURE — 500331 HCHG COTTONOID, SURG PATTIE: Performed by: NEUROLOGICAL SURGERY

## 2017-01-15 PROCEDURE — A9270 NON-COVERED ITEM OR SERVICE: HCPCS | Performed by: HOSPITALIST

## 2017-01-15 PROCEDURE — 80048 BASIC METABOLIC PNL TOTAL CA: CPT

## 2017-01-15 PROCEDURE — 700102 HCHG RX REV CODE 250 W/ 637 OVERRIDE(OP): Performed by: INTERNAL MEDICINE

## 2017-01-15 PROCEDURE — A6404 STERILE GAUZE > 48 SQ IN: HCPCS | Performed by: NEUROLOGICAL SURGERY

## 2017-01-15 PROCEDURE — A9270 NON-COVERED ITEM OR SERVICE: HCPCS

## 2017-01-15 PROCEDURE — 160042 HCHG SURGERY MINUTES - EA ADDL 1 MIN LEVEL 5: Performed by: NEUROLOGICAL SURGERY

## 2017-01-15 PROCEDURE — 500096 HCHG BONE CEMENT, SIMPLEX ANTIBIOTIC: Performed by: NEUROLOGICAL SURGERY

## 2017-01-15 PROCEDURE — 160031 HCHG SURGERY MINUTES - 1ST 30 MINS LEVEL 5: Performed by: NEUROLOGICAL SURGERY

## 2017-01-15 PROCEDURE — 500364 HCHG DISSECT TOOL, MIDAS: Performed by: NEUROLOGICAL SURGERY

## 2017-01-15 PROCEDURE — 70450 CT HEAD/BRAIN W/O DYE: CPT

## 2017-01-15 PROCEDURE — 700102 HCHG RX REV CODE 250 W/ 637 OVERRIDE(OP)

## 2017-01-15 PROCEDURE — 160035 HCHG PACU - 1ST 60 MINS PHASE I: Performed by: NEUROLOGICAL SURGERY

## 2017-01-15 PROCEDURE — 770022 HCHG ROOM/CARE - ICU (200)

## 2017-01-15 PROCEDURE — 87340 HEPATITIS B SURFACE AG IA: CPT

## 2017-01-15 PROCEDURE — 501838 HCHG SUTURE GENERAL: Performed by: NEUROLOGICAL SURGERY

## 2017-01-15 PROCEDURE — 85027 COMPLETE CBC AUTOMATED: CPT

## 2017-01-15 PROCEDURE — 110382 HCHG SHELL REV 271: Performed by: NEUROLOGICAL SURGERY

## 2017-01-15 PROCEDURE — 0NR00JZ REPLACEMENT OF SKULL WITH SYNTHETIC SUBSTITUTE, OPEN APPROACH: ICD-10-PCS | Performed by: NEUROLOGICAL SURGERY

## 2017-01-15 PROCEDURE — 82962 GLUCOSE BLOOD TEST: CPT

## 2017-01-15 PROCEDURE — 500819 HCHG MARKERS, PASSIVE REFLECTIVE: Performed by: NEUROLOGICAL SURGERY

## 2017-01-15 PROCEDURE — 502240 HCHG MISC OR SUPPLY RC 0272: Performed by: NEUROLOGICAL SURGERY

## 2017-01-15 PROCEDURE — 82803 BLOOD GASES ANY COMBINATION: CPT

## 2017-01-15 PROCEDURE — 84132 ASSAY OF SERUM POTASSIUM: CPT

## 2017-01-15 PROCEDURE — 500445 HCHG HEMOSTAT, SURGICEL 4X8: Performed by: NEUROLOGICAL SURGERY

## 2017-01-15 PROCEDURE — 160048 HCHG OR STATISTICAL LEVEL 1-5: Performed by: NEUROLOGICAL SURGERY

## 2017-01-15 PROCEDURE — 160002 HCHG RECOVERY MINUTES (STAT): Performed by: NEUROLOGICAL SURGERY

## 2017-01-15 PROCEDURE — 700101 HCHG RX REV CODE 250

## 2017-01-15 PROCEDURE — 86850 RBC ANTIBODY SCREEN: CPT

## 2017-01-15 PROCEDURE — 502626 HCHG SURGIFLO HEMOSTATIC MATRIX 6ML: Performed by: NEUROLOGICAL SURGERY

## 2017-01-15 PROCEDURE — 700102 HCHG RX REV CODE 250 W/ 637 OVERRIDE(OP): Performed by: HOSPITALIST

## 2017-01-15 PROCEDURE — 500303 HCHG CLIP, SCALP: Performed by: NEUROLOGICAL SURGERY

## 2017-01-15 PROCEDURE — 00B70ZZ EXCISION OF CEREBRAL HEMISPHERE, OPEN APPROACH: ICD-10-PCS | Performed by: NEUROLOGICAL SURGERY

## 2017-01-15 PROCEDURE — 86901 BLOOD TYPING SEROLOGIC RH(D): CPT

## 2017-01-15 PROCEDURE — 700111 HCHG RX REV CODE 636 W/ 250 OVERRIDE (IP): Performed by: INTERNAL MEDICINE

## 2017-01-15 PROCEDURE — 500075 HCHG BLADE, CLIPPER NEURO: Performed by: NEUROLOGICAL SURGERY

## 2017-01-15 DEVICE — PLATE NC BURR HOLE COVER FOR SHUNT 14MM (6NCX6=36): Type: IMPLANTABLE DEVICE | Status: FUNCTIONAL

## 2017-01-15 DEVICE — BONE CEMENT SIMPLEX ANTIBIO - (10/PK): Type: IMPLANTABLE DEVICE | Status: FUNCTIONAL

## 2017-01-15 DEVICE — SCREW STRYK NC 1.5X5MM (6NCX40=240) (80EA/PK) CONSIGNED QTY 240 PRE-LOAD: Type: IMPLANTABLE DEVICE | Status: FUNCTIONAL

## 2017-01-15 RX ORDER — BUPIVACAINE HYDROCHLORIDE AND EPINEPHRINE 5; 5 MG/ML; UG/ML
INJECTION, SOLUTION EPIDURAL; INTRACAUDAL; PERINEURAL
Status: DISCONTINUED | OUTPATIENT
Start: 2017-01-15 | End: 2017-01-15 | Stop reason: HOSPADM

## 2017-01-15 RX ORDER — SODIUM CHLORIDE, SODIUM LACTATE, POTASSIUM CHLORIDE, AND CALCIUM CHLORIDE .6; .31; .03; .02 G/100ML; G/100ML; G/100ML; G/100ML
IRRIGANT IRRIGATION
Status: DISCONTINUED | OUTPATIENT
Start: 2017-01-15 | End: 2017-01-15 | Stop reason: HOSPADM

## 2017-01-15 RX ORDER — NEOMYCIN SULFATE, POLYMYXIN B SULFATE AND BACITRACIN ZINC 3.5; 10000; 4 MG/G; [USP'U]/G; [USP'U]/G
OINTMENT OPHTHALMIC
Status: DISCONTINUED | OUTPATIENT
Start: 2017-01-15 | End: 2017-01-15 | Stop reason: HOSPADM

## 2017-01-15 RX ORDER — LABETALOL HYDROCHLORIDE 5 MG/ML
10 INJECTION, SOLUTION INTRAVENOUS EVERY 4 HOURS PRN
Status: DISCONTINUED | OUTPATIENT
Start: 2017-01-15 | End: 2017-01-16

## 2017-01-15 RX ORDER — LEVETIRACETAM 500 MG/1
500 TABLET ORAL 2 TIMES DAILY
Status: DISCONTINUED | OUTPATIENT
Start: 2017-01-15 | End: 2017-01-15

## 2017-01-15 RX ORDER — HYDRALAZINE HYDROCHLORIDE 20 MG/ML
20 INJECTION INTRAMUSCULAR; INTRAVENOUS EVERY 6 HOURS PRN
Status: DISCONTINUED | OUTPATIENT
Start: 2017-01-15 | End: 2017-01-16

## 2017-01-15 RX ADMIN — DEXAMETHASONE SODIUM PHOSPHATE 4 MG: 4 INJECTION, SOLUTION INTRAMUSCULAR; INTRAVENOUS at 00:02

## 2017-01-15 RX ADMIN — MORPHINE SULFATE 2 MG: 4 INJECTION INTRAVENOUS at 17:28

## 2017-01-15 RX ADMIN — FENTANYL CITRATE 50 MCG: 50 INJECTION, SOLUTION INTRAMUSCULAR; INTRAVENOUS at 11:41

## 2017-01-15 RX ADMIN — CEFAZOLIN SODIUM 1 G: 1 INJECTION, SOLUTION INTRAVENOUS at 15:09

## 2017-01-15 RX ADMIN — DEXAMETHASONE SODIUM PHOSPHATE 4 MG: 4 INJECTION, SOLUTION INTRAMUSCULAR; INTRAVENOUS at 17:42

## 2017-01-15 RX ADMIN — INSULIN GLARGINE 20 UNITS: 100 INJECTION, SOLUTION SUBCUTANEOUS at 20:12

## 2017-01-15 RX ADMIN — DEXAMETHASONE SODIUM PHOSPHATE 4 MG: 4 INJECTION, SOLUTION INTRAMUSCULAR; INTRAVENOUS at 05:07

## 2017-01-15 RX ADMIN — MORPHINE SULFATE 4 MG: 4 INJECTION INTRAVENOUS at 02:08

## 2017-01-15 RX ADMIN — DEXTROSE MONOHYDRATE 500 MG: 50 INJECTION, SOLUTION INTRAVENOUS at 21:13

## 2017-01-15 RX ADMIN — FENTANYL CITRATE 50 MCG: 50 INJECTION, SOLUTION INTRAMUSCULAR; INTRAVENOUS at 12:25

## 2017-01-15 RX ADMIN — MORPHINE SULFATE 2 MG: 4 INJECTION INTRAVENOUS at 14:16

## 2017-01-15 RX ADMIN — INSULIN LISPRO 3 UNITS: 100 INJECTION, SOLUTION INTRAVENOUS; SUBCUTANEOUS at 20:12

## 2017-01-15 RX ADMIN — HYDRALAZINE HYDROCHLORIDE 50 MG: 50 TABLET ORAL at 05:07

## 2017-01-15 RX ADMIN — CEFAZOLIN SODIUM 1 G: 1 INJECTION, SOLUTION INTRAVENOUS at 21:00

## 2017-01-15 RX ADMIN — HYDRALAZINE HYDROCHLORIDE 20 MG: 20 INJECTION INTRAMUSCULAR; INTRAVENOUS at 22:30

## 2017-01-15 RX ADMIN — INSULIN LISPRO 4 UNITS: 100 INJECTION, SOLUTION INTRAVENOUS; SUBCUTANEOUS at 17:40

## 2017-01-15 RX ADMIN — DEXAMETHASONE SODIUM PHOSPHATE 4 MG: 4 INJECTION, SOLUTION INTRAMUSCULAR; INTRAVENOUS at 14:24

## 2017-01-15 ASSESSMENT — ENCOUNTER SYMPTOMS
NECK PAIN: 0
SHORTNESS OF BREATH: 0
HEMOPTYSIS: 0
CHILLS: 0
TINGLING: 0
NAUSEA: 0
BLURRED VISION: 0
HEARTBURN: 0
VOMITING: 0
DEPRESSION: 0
DIZZINESS: 0
HEADACHES: 1
ABDOMINAL PAIN: 0
PND: 0
HEADACHES: 0
BACK PAIN: 0
FEVER: 0
NAUSEA: 1
COUGH: 0
INSOMNIA: 0
WEAKNESS: 1
PALPITATIONS: 0
DOUBLE VISION: 0
PHOTOPHOBIA: 0
ORTHOPNEA: 0

## 2017-01-15 ASSESSMENT — PAIN SCALES - GENERAL
PAINLEVEL_OUTOF10: 0
PAINLEVEL_OUTOF10: 9
PAINLEVEL_OUTOF10: 0
PAINLEVEL_OUTOF10: 2
PAINLEVEL_OUTOF10: ASSUMED PAIN PRESENT

## 2017-01-15 NOTE — PROGRESS NOTES
Hemodialysis treatment #2 on 1/14/2017 initiated on 1505 pm and ended on 1736 pm and was ordered by Dr. Pierce. Please see paper flow sheets for details. Net UF: 2110 ml. Patient stable, tolerated treatment, AO x 3, lethargic/sleepy at times, afebrile, no shortness of breath noted, VSS, right IJ HD temp catheter intact, no s/s of infection noted, dressing - CDI, no oozing/ no bleeding/ no hematoma noted, patient w/o acute c/o or acute events during treatment and at this time. Report given to primary RN - Azalea Cornejo RN.

## 2017-01-15 NOTE — PROGRESS NOTES
Assumed pt care at 1900. Received report from day RN. Assessment completed. Pt A&Ox4. Pain 8/10. Bed in lowest position, locked, and bed alarm is on. Pt calls appropriately. Treaded socks in place, call light within reach and staff numbers provided. Pt needs met at this time.

## 2017-01-15 NOTE — PROGRESS NOTES
Progress Note               Author: Bry Ventura MD Date & Time created: 1/15/2017  7:19 AM     Interval History:  Pt stable  Headache and nausea improved  ESRD-HD yesterday and today; cleared for surgery  CR-5.3      Review of Systems:  Review of Systems   Neurological: Positive for headaches.       Physical Exam:  Physical Exam   Constitutional: She is oriented to person, place, and time.   Neurological: She is alert and oriented to person, place, and time.   Alert to person and place only   Slight left generalized leg weakness  CN 2-12 grossly intact   No drift.        Labs:        Invalid input(s): [id number]      Recent Labs      176  17   0859  01/15/17   0305   SODIUM  128*  132*  131*   POTASSIUM  5.7*  5.1  5.3   CHLORIDE  101  101  99   CO2  17*  22  23   BUN  129*  97*  72*   CREATININE  6.57*  5.30*  4.46*   CALCIUM  8.1*  8.6  8.5     Recent Labs      17   0859  01/15/17   0305   GLUCOSE  179*  127*  175*     Recent Labs      17   0436  17   0245   RBC  4.50  4.58   HEMOGLOBIN  13.7  14.0   HEMATOCRIT  40.7  40.6   PLATELETCT  173  146*     Recent Labs      17   0245   WBC  9.8  13.6*   NEUTSPOLYS  90.10*  89.70*   LYMPHOCYTES  5.80*  4.00*   MONOCYTES  2.10  4.90   EOSINOPHILS  0.00  0.00   BASOPHILS  0.20  0.20     Hemodynamics:  Temp (24hrs), Av.1 °C (96.9 °F), Min:35.6 °C (96 °F), Max:36.6 °C (97.9 °F)  Temperature: 36.1 °C (97 °F)  Pulse  Av.1  Min: 52  Max: 120   Blood Pressure : 149/63 mmHg     Respiratory:    Respiration: 16, Pulse Oximetry: 93 %     Work Of Breathing / Effort: Mild  RUL Breath Sounds: Clear, RML Breath Sounds: Clear, RLL Breath Sounds: Clear, SOURAV Breath Sounds: Clear, LLL Breath Sounds: Clear  Fluids:  No intake or output data in the 24 hours ending 17 0835     GI/Nutrition:  Orders Placed This Encounter   Procedures   • DIET NPO     Standing Status: Standing      Number of  Occurrences: 1      Standing Expiration Date:      Order Specific Question:  Restrict to:     Answer:  Sips with Medications [3]     Medical Decision Making, by Problem:  Active Hospital Problems    Diagnosis   • Intracranial mass [R90.0]       Plan:  Neuro stable, HD yesterday  To OR today for right crani and tumor resection  ICU post-op      Labs reviewed, Radiology images reviewed and Medications reviewed

## 2017-01-15 NOTE — PROGRESS NOTES
[facility] Nephrology Progress Note               Author: Nikolay Pierce MD Date & Time created: 1/15/2017  8:05 AM     Interval History:  71 year old female with PMHx DM type 2, CKD who presented with headache and N/V and was found to have large right temporal intracranial mass on CT head ---> MRI brain reveals large right temporal lobe mass with extensive vasogenic edema most consistent with glioblastoma multiforme. There is R>L midline shift.    Consult to manage ANNABEL on CKD - patient has repeatedly said as outpatient - under no circumstances would she accept dialysis either temporarily or permanently.  HOWEVER - she has recanted that statement to me at this point.    DAILY NEPHROLOGY SUMMARY  1/08/17 - seen in consultation by Dr Chua.  ANNABEL felt likely due to pre-renal azotemia from poor intake w Nausea/headaches  1/09/17 - renal function not improved - no fluids given overnight - initiating x 1L  1/10/17 - Cr dropped from 5.17 to 5.09. K: 5.2. Nearly euvolemic. Better appetite. No indication to start HD now.    1/11/17 - MR done again on Khari 10. Nephro team double checked with Radiology office at 4492, the MR was done WITHOUT contrast. No HD today even her Cr is going up. No subjective uremic complaints. Near euvolemic status.    1/12/17 - HD catheter done at right IJ. No complication noted.    1/13/17 - Elevated Bun/Cr/K. No much subjective uremic symptoms. No symptoms from high K (5.7) 1st HD on Jan 13.   1/14/17 - s/p HD #1 yesterday without complication  Running again today in preparation for surgery tomorrow  1/15/17 - tolerated HD yesterday.  TO OR TODAY.      Review of Systems   Constitutional: Negative for fever and chills.   Eyes: Negative for blurred vision and double vision.   Respiratory: Negative for cough and shortness of breath.    Cardiovascular: Negative for chest pain and PND.   Gastrointestinal: Positive for nausea. Negative for heartburn, vomiting and abdominal pain.   Genitourinary:  Negative for dysuria.   Neurological: Negative for dizziness and headaches.   All other systems reviewed and are negative.      Physical Exam   Constitutional: She is oriented to person, place, and time. She appears well-developed and well-nourished.   HENT:   Head: Normocephalic and atraumatic.   Eyes: Conjunctivae are normal.   Cardiovascular: Normal rate and regular rhythm.    Pulmonary/Chest: Effort normal and breath sounds normal. No respiratory distress. She has no wheezes. She has no rales.   Abdominal: Soft. Bowel sounds are normal. She exhibits no distension. There is no tenderness.   Musculoskeletal: Normal range of motion.   Neurological: She is alert and oriented to person, place, and time. No cranial nerve deficit. Coordination normal.   Psychiatric: She has a normal mood and affect. Her behavior is normal.   Nursing note and vitals reviewed.      Labs:        Invalid input(s): [id number]      Recent Labs      17   0859  01/15/17   0305   SODIUM  128*  132*  131*   POTASSIUM  5.7*  5.1  5.3   CHLORIDE  101  101  99   CO2  17*  22  23   BUN  129*  97*  72*   CREATININE  6.57*  5.30*  4.46*   CALCIUM  8.1*  8.6  8.5     Recent Labs      176  17   0859  01/15/17   0305   GLUCOSE  179*  127*  175*     Recent Labs      176  17   0245   RBC  4.50  4.58   HEMOGLOBIN  13.7  14.0   HEMATOCRIT  40.7  40.6   PLATELETCT  173  146*     Recent Labs      17   0245   WBC  9.8  13.6*   NEUTSPOLYS  90.10*  89.70*   LYMPHOCYTES  5.80*  4.00*   MONOCYTES  2.10  4.90   EOSINOPHILS  0.00  0.00   BASOPHILS  0.20  0.20           Hemodynamics:  Temp (24hrs), Av.1 °C (97 °F), Min:35.6 °C (96 °F), Max:36.6 °C (97.9 °F)  Temperature: 36.5 °C (97.7 °F)  Pulse  Av.1  Min: 52  Max: 120Heart Rate (Monitored): (!) 59  Blood Pressure : 149/63 mmHg, NIBP: (!) 181/72 mmHg     Respiratory:    Respiration: 16, Pulse Oximetry: 98 %     Work Of  Breathing / Effort: Mild  RUL Breath Sounds: Clear, RML Breath Sounds: Clear, RLL Breath Sounds: Clear, SOURAV Breath Sounds: Clear, LLL Breath Sounds: Clear  Fluids:    Intake/Output Summary (Last 24 hours) at 01/15/17 0805  Last data filed at 01/15/17 0600   Gross per 24 hour   Intake    600 ml   Output   2610 ml   Net  -2010 ml        GI/Nutrition:  Orders Placed This Encounter   Procedures   • DIET NPO     Standing Status: Standing      Number of Occurrences: 1      Standing Expiration Date:      Order Specific Question:  Restrict to:     Answer:  Sips with Medications [3]     Medical Decision Making, by Problem:  Active Hospital Problems    Diagnosis       Assessment/Plan:  # NEW ESRD  ---- s/p placement of dialysis catheter/Permcath on 1/12/17   -- s/p initiation of hemodialysis on 1/13/17  --- next likely run on 1/16/17    # Intracranial Mass  --Neurosurgery on 1/15/17  --Oncology Dr. Salomon also following    # HTN  --sub-optimal control  --Continue home BP meds  --Started norvasc 5mg daily on 1/12/17 - need to wait 5 days before adding more    # DM II--management per primary svc  # Anemia--secondary to CKD, no need for TANISHA  # Metabolic Acidosis--start PO bicarb supplements        Labs reviewed, Medications reviewed and Radiology images reviewed        DVT Prophylaxis: Heparin

## 2017-01-15 NOTE — CARE PLAN
Problem: Pain Management  Goal: Pain level will decrease to patient’s comfort goal  Outcome: PROGRESSING AS EXPECTED    Problem: Respiratory:  Goal: Respiratory status will improve  Outcome: PROGRESSING AS EXPECTED

## 2017-01-15 NOTE — OR SURGEON
Immediate Post-Operative Note      PreOp Diagnosis: Right Temporal Lobe Mass    PostOp Diagnosis: same    Procedure(s):  CRANIOTOMY STEALTH FOR RESECTION OF RIGHT TEMPORAL BRAIN MASS    Surgeon(s):  Bry Ventura M.D.    Anesthesiologist/Type of Anesthesia:  Anesthesiologist: Devin Carlos M.D./General    Surgical Staff:  Assistant: Renu Jauregui PA-C  Circulator: Jennifer Diaz R.N.  Relief Circulator: Laron Gomez R.N.  Scrub Person: Mike Diazse    Specimen: to pathology    Estimated Blood Loss: 150ccs    Findings: see op report    Complications: none        1/15/2017 10:59 AM Renu Jauregui

## 2017-01-15 NOTE — OR NURSING
Dr Claudio at bedside; attempted exam; patient just waking, responds verbally, not following commands.

## 2017-01-15 NOTE — OR NURSING
Dr Claudio at bedside; exam completed; patient verbally states that she is moving and the only movement is a weak hand grasp of the right hand.

## 2017-01-15 NOTE — PROGRESS NOTES
Hospital Medicine Progress Note, Adult, Complex               Author: Tor pro Date & Time created: 1/15/2017  9:05 AM     71 year old female with CKD presented with headache and N/V and was found to have large right temporal intracranial mass, GBM. NSx, Onc and Nephrology following.       Interval History:  Went to surgery today and then icu postop.       Review of Systems:  Review of Systems   Constitutional: Positive for malaise/fatigue.   HENT: Negative for hearing loss.    Eyes: Negative for double vision and photophobia.   Respiratory: Negative for cough and hemoptysis.    Cardiovascular: Negative for palpitations and orthopnea.   Gastrointestinal: Negative for nausea and vomiting.   Genitourinary: Negative for dysuria and urgency.   Musculoskeletal: Negative for back pain and neck pain.   Skin: Negative for itching and rash.   Neurological: Positive for weakness and headaches. Negative for dizziness and tingling.   Psychiatric/Behavioral: Negative for depression. The patient does not have insomnia.    All other systems reviewed and are negative.      Physical Exam:  Physical Exam   Constitutional: She is oriented to person, place, and time. She appears well-developed and well-nourished. No distress.   HENT:   Right Ear: External ear normal.   Left Ear: External ear normal.   Nose: Nose normal.   Right ij dialysis cath   Eyes: Conjunctivae are normal. No scleral icterus.   Neck: No JVD present.   Cardiovascular: Normal rate, regular rhythm and normal heart sounds.    No murmur heard.  Pulmonary/Chest: Effort normal. No stridor. She has no wheezes. She has no rales.   Abdominal: Soft. Bowel sounds are normal. She exhibits no distension. There is no rebound.   Musculoskeletal: Normal range of motion. She exhibits no tenderness.   Neurological: She is alert and oriented to person, place, and time. She displays normal reflexes. No cranial nerve deficit. She exhibits normal muscle tone.   Skin: Skin is warm  and dry. She is not diaphoretic. No erythema.   Psychiatric: She has a normal mood and affect. Her behavior is normal.   Nursing note and vitals reviewed.      Labs:        Invalid input(s): [id number]      Recent Labs      17   0436  17   0859  01/15/17   0305   SODIUM  128*  132*  131*   POTASSIUM  5.7*  5.1  5.3   CHLORIDE  101  101  99   CO2  17*  22  23   BUN  129*  97*  72*   CREATININE  6.57*  5.30*  4.46*   CALCIUM  8.1*  8.6  8.5     Recent Labs      17   0436  17   0859  01/15/17   0305   GLUCOSE  179*  127*  175*     Recent Labs      17   0245   RBC  4.50  4.58   HEMOGLOBIN  13.7  14.0   HEMATOCRIT  40.7  40.6   PLATELETCT  173  146*     Recent Labs      17   0245   WBC  9.8  13.6*   NEUTSPOLYS  90.10*  89.70*   LYMPHOCYTES  5.80*  4.00*   MONOCYTES  2.10  4.90   EOSINOPHILS  0.00  0.00   BASOPHILS  0.20  0.20           Hemodynamics:  Temp (24hrs), Av.1 °C (97 °F), Min:35.6 °C (96 °F), Max:36.6 °C (97.9 °F)  Temperature: 36.5 °C (97.7 °F)  Pulse  Av.1  Min: 52  Max: 120Heart Rate (Monitored): (!) 59  Blood Pressure : 149/63 mmHg, NIBP: (!) 181/72 mmHg     Respiratory:    Respiration: 16, Pulse Oximetry: 98 %           Fluids:    Intake/Output Summary (Last 24 hours) at 01/15/17 0905  Last data filed at 01/15/17 0600   Gross per 24 hour   Intake    600 ml   Output   2610 ml   Net  -2010 ml        GI/Nutrition:  Orders Placed This Encounter   Procedures   • DIET NPO     Standing Status: Standing      Number of Occurrences: 1      Standing Expiration Date:      Order Specific Question:  Restrict to:     Answer:  Sips with Medications [3]     Medical Decision Making, by Problem:  Active Hospital Problems    Diagnosis   • • Intracranial mass [R90.0]   -Likely Glioblastoma per MRI  -s/p surgical resection today per  Neurosurgery  -continue Steroids, Keppra, pain control.   - note morphine ok for pain control. This should not  affect heart rate. Bp stable. Discussed with RN  • Acute on chronic renal insufficiency (HCC) [N28.9, N18.9]   -worsening - s/p 1 round dialysis. nephro following. Bicarb for metabolic acidosis 2/2 renal failure.     Type 2 diabetes mellitus -uncontrolled likely due to steroids, evidence for previous control. -only 5.9 for HbA1c  -now on Lantus 20 U  -continue ISS with serial accu-checks           • HTN (hypertension) [I10]  -benign, controlled but bradycardia noted at night. Will d/c cardizem and contine toprol. Add hydralazine and continue norvasc.      hyponatremia- Salt tabs causea vomiting. Getting dialysis    Benadryl cream for itching- possible uremia related.       Dicussed plan with RN  dispo- likely snf but not clear yet.           Labs reviewed, Medications reviewed, Radiology images reviewed and EKG reviewed  Hillman catheter: No Hillman      DVT Prophylaxis: Heparin    Ulcer prophylaxis: Not indicated    Assessed for rehab: Patient was assess for and/or received rehabilitation services during this hospitalization

## 2017-01-15 NOTE — OP REPORT
DATE OF SERVICE:  01/15/2017    SURGEON:  Bry Ventura MD    ASSISTANT:  Renu Jauregui PA-C    ANESTHESIOLOGIST:  Devin Carlos MD    TYPE OF ANESTHESIA:  General endotracheal.    PREPROCEDURE DIAGNOSIS:  Right temporal intraaxial brain tumor, malignant.    POSTOPERATIVE DIAGNOSIS:  Right temporal intraaxial brain tumor, malignant.    PROCEDURE PERFORMED:    1.  Right temporal craniotomy for resection of brain tumor.  2.  [employer] stealth intraoperative navigation.  3.  Intraoperative microscopic dissection, use of intraoperative microscope.  4.  Cranioplasty, 1-3 cm for reconstruction of middle cranial fossa floor, use   of methylmethacrylate bone cement.    INDICATION FOR PROCEDURE:  This is a 71-year-old female with progressive   subacute headache and left upper extremity weakness, which is mild.  She came   the hospital and was evaluated with a non-contrast CT scan, which showed an   anterior right temporal brain mass.  She had chronic kidney disease, stage   III-IV.  She was not able to be evaluated with any type of contrast.  She had   an MRI without contrast, which redemonstrated the lesion.  Due to the   symptomatic nature of her diagnosis and treatment, she opted to undergo   intervention.  She understood the risks, benefits, and alternatives of the   procedure.    PROCEDURE IN DETAIL:  The patient was brought into the operating room and   placed supine on operating room table.  She was intubated by the   anesthesiologist.  A _____ bump was placed under her right shoulder.  Root   pins were applied to the skull.  The patient was positioned.  Stealth   navigation was used to plan an incision for craniotomy.  Patient was prepped   and draped in the usual sterile fashion.  Preprocedure time-out was performed.    She received 12 mg of Decadron and 1000 mg of Keppra IV prior to her   incision along with her antibiotics.  The skin was infiltrated with local   anesthetic with epinephrine.   A 10-blade was used to sharply incise the scalp,   galea and pericranial tissue to the skull in the planned right temporal   craniotomy incision.  Ta clips were applied for hemostasis.  A myocutaneous   flap was developed and mobilized anteriorly.  Fish hooks were used to secure   the flap.   was used to make 3 george holes in the right side of the   skull over the temporal lobe.  Craniotome was used to elevate a bone flap.    Tack-up sutures were used for epidural hemostasis along with Surgiflo.  After   epidural hemostasis was achieved and the wound was irrigated, the dura was   opened with 11-blade and Metzenbaum scissors in a U shape manner.  This was   reflected anteriorly.  A wet Telfa _____ 04.21 was placed over the dura flap.    Intraoperative stealth navigation was used to localize the tumor beneath the   middle temporal gyrus of the right temporal lobe.  Corticectomy was performed   with bipolar electrocautery as well as micro scissors.  The tumor was   localized and found.  The microscope was brought in and the tumor was noted to be grayish with thrombosed venous systems and   was moderately vascular.  It appeared consistent with a glioblastoma   multiforme.  Multiple samples of the tumor were taken for final pathology.    The remainder of the tumor was removed with a combination of suction and   bipolar electrocautery.  Once all margins were noted to be free of tumor,   including anterior, medially and posteriorly, hemostasis was achieved with a   bipolar electrocautery.  Surgicel was laid over the brain tissue.  Five   minutes elapsed and the wound was noted to be free of any active bleeding.  The microscope was removed.    Again the craniotomy site was irrigated with bacitracin saline.  Cranial   plating system was used to reaffix the bone flap to her native bone.    Methylmethacrylate was used to fill in a small craniectomy defect along the   right anterior temporal lobe and medal cranial  fossa.  This was between 1-3   cm.  Once the methylmethacrylate dried, the subgaleal space was irrigated.    Bipolar electrocautery was used for superficial hemostasis.  The wound was   closed in layers, the first layer being the galea was reapproximated with 2-0   Vicryls.  The skin edges were reapproximated with staples.  A sterile dressing   was applied.  The drapes were removed and patient was extubated in the   operating room, taken to PACU in stable condition.    COMPLICATIONS:  None.    ESTIMATED BLOOD LOSS:  100 mL.       ____________________________________     Bry Ventura MD SA / ISA    DD:  01/15/2017 11:10:02  DT:  01/15/2017 12:35:49    D#:  [id number]  Job#:  [id number]

## 2017-01-16 ENCOUNTER — APPOINTMENT (OUTPATIENT)
Dept: RADIOLOGY | Facility: MEDICAL CENTER | Age: 72
DRG: 025 | End: 2017-01-16
Attending: NEUROLOGICAL SURGERY
Payer: MEDICARE

## 2017-01-16 ENCOUNTER — APPOINTMENT (OUTPATIENT)
Dept: RADIOLOGY | Facility: MEDICAL CENTER | Age: 72
DRG: 025 | End: 2017-01-16
Attending: HOSPITALIST
Payer: MEDICARE

## 2017-01-16 LAB
AMORPH CRY #/AREA URNS HPF: PRESENT /HPF
ANION GAP SERPL CALC-SCNC: 10 MMOL/L (ref 0–11.9)
ANION GAP SERPL CALC-SCNC: 12 MMOL/L (ref 0–11.9)
APPEARANCE UR: ABNORMAL
BACTERIA #/AREA URNS HPF: ABNORMAL /HPF
BASOPHILS # BLD AUTO: 0.2 % (ref 0–1.8)
BASOPHILS # BLD: 0.05 K/UL (ref 0–0.12)
BILIRUB UR QL STRIP.AUTO: NEGATIVE
BUN SERPL-MCNC: 90 MG/DL (ref 8–22)
BUN SERPL-MCNC: 96 MG/DL (ref 8–22)
CALCIUM SERPL-MCNC: 8.3 MG/DL (ref 8.5–10.5)
CALCIUM SERPL-MCNC: 8.3 MG/DL (ref 8.5–10.5)
CHLORIDE SERPL-SCNC: 100 MMOL/L (ref 96–112)
CHLORIDE SERPL-SCNC: 101 MMOL/L (ref 96–112)
CO2 SERPL-SCNC: 21 MMOL/L (ref 20–33)
CO2 SERPL-SCNC: 22 MMOL/L (ref 20–33)
COLOR UR: YELLOW
COMMENT 1642: NORMAL
CREAT SERPL-MCNC: 4.88 MG/DL (ref 0.5–1.4)
CREAT SERPL-MCNC: 5.23 MG/DL (ref 0.5–1.4)
EOSINOPHIL # BLD AUTO: 0 K/UL (ref 0–0.51)
EOSINOPHIL NFR BLD: 0 % (ref 0–6.9)
EPI CELLS #/AREA URNS HPF: ABNORMAL /HPF
ERYTHROCYTE [DISTWIDTH] IN BLOOD BY AUTOMATED COUNT: 47.8 FL (ref 35.9–50)
GFR SERPL CREATININE-BSD FRML MDRD: 8 ML/MIN/1.73 M 2
GFR SERPL CREATININE-BSD FRML MDRD: 9 ML/MIN/1.73 M 2
GLUCOSE BLD-MCNC: 103 MG/DL (ref 65–99)
GLUCOSE BLD-MCNC: 112 MG/DL (ref 65–99)
GLUCOSE BLD-MCNC: 134 MG/DL (ref 65–99)
GLUCOSE BLD-MCNC: 145 MG/DL (ref 65–99)
GLUCOSE SERPL-MCNC: 141 MG/DL (ref 65–99)
GLUCOSE SERPL-MCNC: 143 MG/DL (ref 65–99)
GLUCOSE UR STRIP.AUTO-MCNC: ABNORMAL MG/DL
GRAN CASTS #/AREA URNS LPF: ABNORMAL /LPF
HBV E AB SERPL QL IA: NEGATIVE
HCT VFR BLD AUTO: 39.1 % (ref 37–47)
HGB BLD-MCNC: 13.2 G/DL (ref 12–16)
IMM GRANULOCYTES # BLD AUTO: 0.42 K/UL (ref 0–0.11)
IMM GRANULOCYTES NFR BLD AUTO: 1.5 % (ref 0–0.9)
KETONES UR STRIP.AUTO-MCNC: NEGATIVE MG/DL
LEUKOCYTE ESTERASE UR QL STRIP.AUTO: ABNORMAL
LYMPHOCYTES # BLD AUTO: 0.47 K/UL (ref 1–4.8)
LYMPHOCYTES NFR BLD: 1.6 % (ref 22–41)
M TB TUBERC IFN-G BLD QL: NEGATIVE
M TB TUBERC IFN-G/MITOGEN IGNF BLD: 0
M TB TUBERC IGNF/MITOGEN IGNF CONTROL: 0.51 [IU]/ML
MCH RBC QN AUTO: 30.5 PG (ref 27–33)
MCHC RBC AUTO-ENTMCNC: 33.8 G/DL (ref 33.6–35)
MCV RBC AUTO: 90.3 FL (ref 81.4–97.8)
MICRO URNS: ABNORMAL
MITOGEN IGNF BCKGRD COR BLD-ACNC: 0.02 [IU]/ML
MONOCYTES # BLD AUTO: 3.23 K/UL (ref 0–0.85)
MONOCYTES NFR BLD AUTO: 11.3 % (ref 0–13.4)
MORPHOLOGY BLD-IMP: NORMAL
MUCOUS THREADS #/AREA URNS HPF: ABNORMAL /HPF
NEUTROPHILS # BLD AUTO: 24.38 K/UL (ref 2–7.15)
NEUTROPHILS NFR BLD: 85.4 % (ref 44–72)
NITRITE UR QL STRIP.AUTO: NEGATIVE
NRBC # BLD AUTO: 0 K/UL
NRBC BLD AUTO-RTO: 0 /100 WBC
PH UR STRIP.AUTO: 5.5 [PH]
PLATELET # BLD AUTO: 140 K/UL (ref 164–446)
PMV BLD AUTO: 12.4 FL (ref 9–12.9)
POTASSIUM SERPL-SCNC: 5.2 MMOL/L (ref 3.6–5.5)
POTASSIUM SERPL-SCNC: 5.3 MMOL/L (ref 3.6–5.5)
PROT UR QL STRIP: 300 MG/DL
RBC # BLD AUTO: 4.33 M/UL (ref 4.2–5.4)
RBC # URNS HPF: ABNORMAL /HPF
RBC UR QL AUTO: ABNORMAL
SODIUM SERPL-SCNC: 132 MMOL/L (ref 135–145)
SODIUM SERPL-SCNC: 134 MMOL/L (ref 135–145)
SP GR UR STRIP.AUTO: 1.01
WBC # BLD AUTO: 28.6 K/UL (ref 4.8–10.8)
WBC #/AREA URNS HPF: ABNORMAL /HPF

## 2017-01-16 PROCEDURE — 700102 HCHG RX REV CODE 250 W/ 637 OVERRIDE(OP): Performed by: HOSPITALIST

## 2017-01-16 PROCEDURE — 700111 HCHG RX REV CODE 636 W/ 250 OVERRIDE (IP): Performed by: HOSPITALIST

## 2017-01-16 PROCEDURE — 80048 BASIC METABOLIC PNL TOTAL CA: CPT

## 2017-01-16 PROCEDURE — A9270 NON-COVERED ITEM OR SERVICE: HCPCS

## 2017-01-16 PROCEDURE — 770022 HCHG ROOM/CARE - ICU (200)

## 2017-01-16 PROCEDURE — A9270 NON-COVERED ITEM OR SERVICE: HCPCS | Performed by: PHYSICIAN ASSISTANT

## 2017-01-16 PROCEDURE — 700101 HCHG RX REV CODE 250: Performed by: HOSPITALIST

## 2017-01-16 PROCEDURE — 700102 HCHG RX REV CODE 250 W/ 637 OVERRIDE(OP): Performed by: NEUROLOGICAL SURGERY

## 2017-01-16 PROCEDURE — 85025 COMPLETE CBC W/AUTO DIFF WBC: CPT

## 2017-01-16 PROCEDURE — 700105 HCHG RX REV CODE 258: Performed by: HOSPITALIST

## 2017-01-16 PROCEDURE — 700111 HCHG RX REV CODE 636 W/ 250 OVERRIDE (IP): Performed by: NEUROLOGICAL SURGERY

## 2017-01-16 PROCEDURE — 700102 HCHG RX REV CODE 250 W/ 637 OVERRIDE(OP)

## 2017-01-16 PROCEDURE — 82962 GLUCOSE BLOOD TEST: CPT

## 2017-01-16 PROCEDURE — A9270 NON-COVERED ITEM OR SERVICE: HCPCS | Performed by: HOSPITALIST

## 2017-01-16 PROCEDURE — 99233 SBSQ HOSP IP/OBS HIGH 50: CPT | Performed by: HOSPITALIST

## 2017-01-16 PROCEDURE — E0191 PROTECTOR HEEL OR ELBOW: HCPCS | Performed by: HOSPITALIST

## 2017-01-16 PROCEDURE — 302136 NUTRITION PUMP: Performed by: HOSPITALIST

## 2017-01-16 PROCEDURE — 700102 HCHG RX REV CODE 250 W/ 637 OVERRIDE(OP): Performed by: PHYSICIAN ASSISTANT

## 2017-01-16 PROCEDURE — 70450 CT HEAD/BRAIN W/O DYE: CPT

## 2017-01-16 PROCEDURE — 700111 HCHG RX REV CODE 636 W/ 250 OVERRIDE (IP): Performed by: PHYSICIAN ASSISTANT

## 2017-01-16 RX ORDER — LABETALOL HYDROCHLORIDE 5 MG/ML
10-20 INJECTION, SOLUTION INTRAVENOUS EVERY 4 HOURS PRN
Status: DISCONTINUED | OUTPATIENT
Start: 2017-01-16 | End: 2017-01-27 | Stop reason: HOSPADM

## 2017-01-16 RX ORDER — SCOLOPAMINE TRANSDERMAL SYSTEM 1 MG/1
1 PATCH, EXTENDED RELEASE TRANSDERMAL
Status: DISCONTINUED | OUTPATIENT
Start: 2017-01-16 | End: 2017-01-27 | Stop reason: HOSPADM

## 2017-01-16 RX ORDER — 3% SODIUM CHLORIDE 3 G/100ML
INJECTION, SOLUTION INTRAVENOUS CONTINUOUS
Status: DISCONTINUED | OUTPATIENT
Start: 2017-01-16 | End: 2017-01-21

## 2017-01-16 RX ORDER — HYDRALAZINE HYDROCHLORIDE 20 MG/ML
20 INJECTION INTRAMUSCULAR; INTRAVENOUS EVERY 4 HOURS PRN
Status: DISCONTINUED | OUTPATIENT
Start: 2017-01-16 | End: 2017-01-27 | Stop reason: HOSPADM

## 2017-01-16 RX ORDER — MICONAZOLE NITRATE 20 MG/G
CREAM TOPICAL 2 TIMES DAILY
Status: DISCONTINUED | OUTPATIENT
Start: 2017-01-16 | End: 2017-01-27 | Stop reason: HOSPADM

## 2017-01-16 RX ADMIN — HYDRALAZINE HYDROCHLORIDE 20 MG: 20 INJECTION INTRAMUSCULAR; INTRAVENOUS at 10:55

## 2017-01-16 RX ADMIN — LABETALOL HYDROCHLORIDE 10 MG: 5 INJECTION, SOLUTION INTRAVENOUS at 15:43

## 2017-01-16 RX ADMIN — INSULIN GLARGINE 20 UNITS: 100 INJECTION, SOLUTION SUBCUTANEOUS at 20:42

## 2017-01-16 RX ADMIN — HYDRALAZINE HYDROCHLORIDE 20 MG: 20 INJECTION INTRAMUSCULAR; INTRAVENOUS at 05:27

## 2017-01-16 RX ADMIN — HYDRALAZINE HYDROCHLORIDE 20 MG: 20 INJECTION INTRAMUSCULAR; INTRAVENOUS at 23:09

## 2017-01-16 RX ADMIN — Medication 1 TABLET: at 20:23

## 2017-01-16 RX ADMIN — ONDANSETRON 4 MG: 2 INJECTION, SOLUTION INTRAMUSCULAR; INTRAVENOUS at 12:20

## 2017-01-16 RX ADMIN — SCOPALAMINE 1 PATCH: 1 PATCH, EXTENDED RELEASE TRANSDERMAL at 11:00

## 2017-01-16 RX ADMIN — MICONAZOLE NITRATE: 20 CREAM TOPICAL at 20:23

## 2017-01-16 RX ADMIN — HYDRALAZINE HYDROCHLORIDE 20 MG: 20 INJECTION INTRAMUSCULAR; INTRAVENOUS at 16:09

## 2017-01-16 RX ADMIN — DEXAMETHASONE SODIUM PHOSPHATE 4 MG: 4 INJECTION, SOLUTION INTRAMUSCULAR; INTRAVENOUS at 12:13

## 2017-01-16 RX ADMIN — DEXAMETHASONE SODIUM PHOSPHATE 4 MG: 4 INJECTION, SOLUTION INTRAMUSCULAR; INTRAVENOUS at 17:39

## 2017-01-16 RX ADMIN — DEXAMETHASONE SODIUM PHOSPHATE 4 MG: 4 INJECTION, SOLUTION INTRAMUSCULAR; INTRAVENOUS at 05:27

## 2017-01-16 RX ADMIN — ONDANSETRON 4 MG: 2 INJECTION, SOLUTION INTRAMUSCULAR; INTRAVENOUS at 17:43

## 2017-01-16 RX ADMIN — DEXAMETHASONE SODIUM PHOSPHATE 4 MG: 4 INJECTION, SOLUTION INTRAMUSCULAR; INTRAVENOUS at 01:00

## 2017-01-16 RX ADMIN — HYDRALAZINE HYDROCHLORIDE 50 MG: 50 TABLET ORAL at 20:23

## 2017-01-16 RX ADMIN — DEXTROSE MONOHYDRATE 500 MG: 50 INJECTION, SOLUTION INTRAVENOUS at 20:23

## 2017-01-16 RX ADMIN — DEXTROSE MONOHYDRATE 500 MG: 50 INJECTION, SOLUTION INTRAVENOUS at 08:05

## 2017-01-16 RX ADMIN — LABETALOL HYDROCHLORIDE 10 MG: 5 INJECTION, SOLUTION INTRAVENOUS at 17:44

## 2017-01-16 RX ADMIN — METOPROLOL TARTRATE 50 MG: 50 TABLET, FILM COATED ORAL at 20:23

## 2017-01-16 RX ADMIN — SIMVASTATIN 10 MG: 20 TABLET, FILM COATED ORAL at 20:23

## 2017-01-16 RX ADMIN — LABETALOL HYDROCHLORIDE 10 MG: 5 INJECTION, SOLUTION INTRAVENOUS at 07:07

## 2017-01-16 RX ADMIN — ONDANSETRON 4 MG: 2 INJECTION, SOLUTION INTRAMUSCULAR; INTRAVENOUS at 20:45

## 2017-01-16 RX ADMIN — SODIUM CHLORIDE: 3 INJECTION, SOLUTION INTRAVENOUS at 12:10

## 2017-01-16 RX ADMIN — HYDRALAZINE HYDROCHLORIDE 50 MG: 50 TABLET ORAL at 14:10

## 2017-01-16 RX ADMIN — DEXAMETHASONE SODIUM PHOSPHATE 4 MG: 4 INJECTION, SOLUTION INTRAMUSCULAR; INTRAVENOUS at 23:10

## 2017-01-16 RX ADMIN — CEFAZOLIN SODIUM 1 G: 1 INJECTION, SOLUTION INTRAVENOUS at 05:27

## 2017-01-16 RX ADMIN — ONDANSETRON 4 MG: 2 INJECTION, SOLUTION INTRAMUSCULAR; INTRAVENOUS at 07:40

## 2017-01-16 ASSESSMENT — ENCOUNTER SYMPTOMS
VOMITING: 1
NAUSEA: 1

## 2017-01-16 NOTE — CARE PLAN
Problem: Safety  Goal: Will remain free from falls  Intervention: Assess risk factors for falls  Bed in low and locked position. All alarm set appropriately, on and running.      Problem: Pain  Goal: Alleviation of Pain or a reduction in pain to the patient’s comfort goal  Will continue to monitor pain throughout this shift. PRN meds to be given if needed    Problem: Skin Integrity  Goal: Risk for impaired skin integrity will decrease  Turn q2h to prevent skin breakdown.  Skin assessed.

## 2017-01-16 NOTE — CARE PLAN
Problem: Nutritional:  Goal: Nutrition support tolerated and meeting greater than 85% of estimated needs  Outcome: NOT MET

## 2017-01-16 NOTE — PROGRESS NOTES
Hospital Medicine Progress Note, Adult, Complex               Author: Doug Guajardo Date & Time created: 1/16/2017  8:32 AM     71 year old female with CKD presented with headache and N/V and was found to have large right temporal intracranial mass, GBM. NSx, Onc and Nephrology following.       Interval History:  Transferred to ICU on 1/15 after surgical resection of glioblastoma  Hemodynamically stable, tachy to 90's  More lethargic this morning, eyes open, moves ext, not talking much, CT done  She is difficult to keep awake, knows her name, knows she is in the hospital    Review of Systems:  Review of Systems   Unable to perform ROS: mental acuity       Physical Exam:  Physical Exam   Constitutional: She is oriented to person, place, and time. She appears well-developed and well-nourished. No distress.   HENT:   Right Ear: External ear normal.   Left Ear: External ear normal.   Nose: Nose normal.   Right ij dialysis cath   Eyes: Conjunctivae are normal.   Neck: No JVD present.   Cardiovascular: Normal rate, regular rhythm and normal heart sounds.    No murmur heard.  Pulmonary/Chest: Effort normal. No stridor. She has no wheezes. She has no rales.   Abdominal: Soft. Bowel sounds are normal. She exhibits no distension.   Musculoskeletal: Normal range of motion. She exhibits no tenderness.   Neurological: She is alert and oriented to person, place, and time. She displays normal reflexes. No cranial nerve deficit. She exhibits normal muscle tone.   Skin: Skin is warm and dry. She is not diaphoretic. No erythema.   Psychiatric: She has a normal mood and affect. Her behavior is normal.   Nursing note and vitals reviewed.      Labs:  Recent Labs      01/15/17   0921   ISTATAPH  7.428   ISTATAPCO2  34.1   ISTATAPO2  103*   ISTATATCO2  24   [id number]  98   ISTATARTHCO3  22.5   ISTATARTBE  -1   ISTATTEMP  36.2 C   ISTATFIO2  0.5   ISTATSPEC  Arterial   ISTATAPHTC  7.440   [id number]  98*         Recent Labs       17   0859  01/15/17   0305  01/15/17   1620   SODIUM  132*  131*  130*   POTASSIUM  5.1  5.3  5.2   CHLORIDE  101  99  99   CO2  22  23  20   BUN  97*  72*  84*   CREATININE  5.30*  4.46*  4.83*   CALCIUM  8.6  8.5  8.3*     Recent Labs      17   0859  01/15/17   0305  01/15/17   1620   GLUCOSE  127*  175*  253*     Recent Labs      17   0245  01/15/17   1605   RBC  4.58  4.38   HEMOGLOBIN  14.0  13.4   HEMATOCRIT  40.6  39.1   PLATELETCT  146*  168     Recent Labs      17   0245  01/15/17   1605   WBC  13.6*  26.4*   NEUTSPOLYS  89.70*   --    LYMPHOCYTES  4.00*   --    MONOCYTES  4.90   --    EOSINOPHILS  0.00   --    BASOPHILS  0.20   --            Hemodynamics:  Temp (24hrs), Av.5 °C (97.7 °F), Min:36.1 °C (97 °F), Max:37.1 °C (98.7 °F)  Temperature: 37.1 °C (98.7 °F)  Pulse  Av.7  Min: 52  Max: 120Heart Rate (Monitored): 86  Blood Pressure : (!) 167/52 mmHg, Arterial BP: 138/60 mmHg, NIBP: 150/67 mmHg     Respiratory:    Respiration: (!) 10, Pulse Oximetry: 96 %     Work Of Breathing / Effort: Shallow  RUL Breath Sounds: Clear, RML Breath Sounds: Clear;Diminished, RLL Breath Sounds: Diminished, SOURAV Breath Sounds: Clear, LLL Breath Sounds: Diminished  Fluids:    Intake/Output Summary (Last 24 hours) at 17 0832  Last data filed at 17 0600   Gross per 24 hour   Intake    880 ml   Output    620 ml   Net    260 ml     Weight: 77.1 kg (169 lb 15.6 oz)  GI/Nutrition:  No orders of the defined types were placed in this encounter.     Medical Decision Making, by Problem:  Active Hospital Problems    Diagnosis   • • Intracranial mass [R90.0]   -glioblastoma likely  -s/p resection, pathology is pending  -steroids, pain control  • Acute on chronic renal insufficiency (HCC) [N28.9, N18.9]   -dialysis  Type 2 diabetes mellitus -uncontrolled likely due to steroids, evidence for previous control. -only 5.9 for HbA1c  -now on Lantus 20 U  -continue ISS with serial accu-checks            • HTN (hypertension) [I10]  -benign, controlled but bradycardia noted at night. Will d/c cardizem and contine toprol. Add hydralazine and continue norvasc.        Encephalopathy  -worsened mental status this morning  -Dr. Pelayo has reviewed this mornings CT scan, wants sodium pushed closer to 140, discussed with Dr. Pierce who   -start slow rate hypertonic saline    Discussed with patient's daughter, she confirms DNR status    Labs reviewed, Medications reviewed and Radiology images reviewed  Hillman catheter: One or Two Days Post Surgery (Day of Surgery being Day 0)      DVT Prophylaxis: Contraindicated - High bleeding risk    Ulcer prophylaxis: Not indicated    Assessed for rehab: Patient was assess for and/or received rehabilitation services during this hospitalization      Patient discussed on rounds with RN, respiratory therapy, and pharmacy.

## 2017-01-16 NOTE — PROGRESS NOTES
Cortrak Placement    Tube Team verified patient name and medical record number prior to tube placement.  Cortrak tube (55 inches, 10 Israeli) placed at 60 cm in left nare.  Per Cortrak picture, tube appears to be in the stomach.  Nursing Instructions: Awaiting KUB to confirm placement before use for medications or feeding. Once placement confirmed, flush tube with 30 ml of water, and then remove and save stylet, in patient medication drawer.

## 2017-01-16 NOTE — PROGRESS NOTES
Pt very lethargic. More difficult to arouse but is still able to say her name and answer yes or no to simple questions. Pupils equal 1-2mm with slight reaction. Vitals within normal limits. Will cont to monitor...

## 2017-01-16 NOTE — DISCHARGE PLANNING
Notified per Dr. Pierce that pt will require OP hemodialysis placement.  Will meet with pt to obtain center choice and to provide dialysis education.  Quantiferon gold has been ordered; pt cannot DC until this lab returns and is negative.  Will follow for clinic acceptance.

## 2017-01-16 NOTE — PROGRESS NOTES
Progress Note               Author: Ramehs Prince Date & Time created: 2017  7:44 AM     Interval History:  POD #1 crani and tumor resection  No pathology seen in records, appears to be GBM from appearance  Globally weak today, left > right   Pt is a bit drowsy, but follows and answers questions  Has some nausea, vomited this morning     Review of Systems:  Review of Systems   Gastrointestinal: Positive for nausea and vomiting.       Physical Exam:  Physical Exam   Neurological:   Pt globally weak, left > right  Drowsy, but following        Labs:  Recent Labs      01/15/17   0921   ISTATAPH  7.428   ISTATAPCO2  34.1   ISTATAPO2  103*   ISTATATCO2  24   [id number]  98   ISTATARTHCO3  22.5   ISTATARTBE  -1   ISTATTEMP  36.2 C   ISTATFIO2  0.5   ISTATSPEC  Arterial   ISTATAPHTC  7.440   [id number]  98*         Recent Labs      17   0859  01/15/17   0305  01/15/17   1620   SODIUM  132*  131*  130*   POTASSIUM  5.1  5.3  5.2   CHLORIDE  101  99  99   CO2  22  23  20   BUN  97*  72*  84*   CREATININE  5.30*  4.46*  4.83*   CALCIUM  8.6  8.5  8.3*     Recent Labs      17   0859  01/15/17   0305  01/15/17   1620   GLUCOSE  127*  175*  253*     Recent Labs      17   0245  01/15/17   1605   RBC  4.58  4.38   HEMOGLOBIN  14.0  13.4   HEMATOCRIT  40.6  39.1   PLATELETCT  146*  168     Recent Labs      17   0245  01/15/17   1605   WBC  13.6*  26.4*   NEUTSPOLYS  89.70*   --    LYMPHOCYTES  4.00*   --    MONOCYTES  4.90   --    EOSINOPHILS  0.00   --    BASOPHILS  0.20   --      Hemodynamics:  Temp (24hrs), Av.5 °C (97.7 °F), Min:36.1 °C (97 °F), Max:37.1 °C (98.7 °F)  Temperature: 37.1 °C (98.7 °F)  Pulse  Av.7  Min: 52  Max: 120Heart Rate (Monitored): 86  Blood Pressure : (!) 167/52 mmHg, Arterial BP: 138/60 mmHg, NIBP: 150/67 mmHg     Respiratory:    Respiration: (!) 10, Pulse Oximetry: 96 %     Work Of Breathing / Effort: Shallow  RUL Breath Sounds: Clear, RML Breath Sounds:  Clear;Diminished, RLL Breath Sounds: Diminished, SOURAV Breath Sounds: Clear, LLL Breath Sounds: Diminished  Fluids:    Intake/Output Summary (Last 24 hours) at 01/16/17 0744  Last data filed at 01/16/17 0600   Gross per 24 hour   Intake    880 ml   Output    620 ml   Net    260 ml     Weight: 77.1 kg (169 lb 15.6 oz)  GI/Nutrition:  No orders of the defined types were placed in this encounter.     Medical Decision Making, by Problem:  Active Hospital Problems    Diagnosis   • Type 2 diabetes mellitus with stage 3 chronic kidney disease (CMS-HCC) [E11.22, N18.3]   • Intracranial mass [R90.0]   • Acute on chronic renal insufficiency (HCC) [N28.9, N18.9]   • HTN (hypertension) [I10]       Plan:  Continue medical care  Will await pathology  Push zofran today  Keep in ICU today, q2hr neuro checks     Labs reviewed, Radiology images reviewed and Medications reviewed

## 2017-01-16 NOTE — PROGRESS NOTES
[facility] Nephrology Progress Note               Author: Nikolay Pierce MD Date & Time created: 1/16/2017  1:50 PM     Interval History:  71 year old female with PMHx DM type 2, CKD who presented with headache and N/V and was found to have large right temporal intracranial mass on CT head ---> MRI brain reveals large right temporal lobe mass with extensive vasogenic edema most consistent with glioblastoma multiforme. There is R>L midline shift.    Consult to manage ANNABEL on CKD - patient has repeatedly said as outpatient - under no circumstances would she accept dialysis either temporarily or permanently.  HOWEVER - she has recanted that statement to me at this point.    DAILY NEPHROLOGY SUMMARY  1/08/17 - seen in consultation by Dr Chua.  ANNABEL felt likely due to pre-renal azotemia from poor intake w Nausea/headaches  1/09/17 - renal function not improved - no fluids given overnight - initiating x 1L  1/10/17 - Cr dropped from 5.17 to 5.09. K: 5.2. Nearly euvolemic. Better appetite. No indication to start HD now.    1/11/17 - MR done again on Khari 10. Nephro team double checked with Radiology office at [id number], the MR was done WITHOUT contrast. No HD today even her Cr is going up. No subjective uremic complaints. Near euvolemic status.    1/12/17 - HD catheter done at right IJ. No complication noted.    1/13/17 - Elevated Bun/Cr/K. No much subjective uremic symptoms. No symptoms from high K (5.7) 1st HD on Jan 13.   1/14/17 - s/p HD #1 yesterday without complication  Running again today in preparation for surgery tomorrow  1/15/17 - tolerated HD yesterday. NeuroSugery on Khari 15.   1/16/17 - Slow response to my interview. No much subjective complaints. Mild drowsiness noted. Bun/Cr from 1/15: 84/4.8 to 90/4.8. K at 5.3. Given major surgery on Khari 15 and Bun/Cr/K relatively stable, we will skip HD for 01/16.       Review of Systems   Limited ROS due to sleepiness/drowsiness.   Constitutional: Negative for fever and  chills.   Eyes: Negative for blurred vision and double vision.   Respiratory: Negative for cough and shortness of breath.    Cardiovascular: Negative for chest pain and PND.   Gastrointestinal: Negative for heartburn, vomiting and abdominal pain.   Genitourinary: Negative for dysuria.   Neurological: Negative for dizziness and headaches.   All other systems reviewed and are negative.      Physical Exam     HEENT: s/p Surgery    Eyes: Conjunctivae are normal.   Cardiovascular: Normal rate and regular rhythm.    Pulmonary/Chest: Effort normal and breath sounds normal. No respiratory distress.Abdominal: No tenderness, no rebounding pain  Musculoskeletal: Normal range of motion.   Neurological: Sleepy, mild drowsy, not oriented.   Psychiatric: Cant evaluate   Nursing note and vitals reviewed.      Labs:  Recent Labs      01/15/17   0921   ISTATAPH  7.428   ISTATAPCO2  34.1   ISTATAPO2  103*   ISTATATCO2  24   [id number]  98   ISTATARTHCO3  22.5   ISTATARTBE  -1   ISTATTEMP  36.2 C   ISTATFIO2  0.5   ISTATSPEC  Arterial   ISTATAPHTC  7.440   [id number]  98*         Recent Labs      01/15/17   0305  01/15/17   1620  17   0930   SODIUM  131*  130*  132*   POTASSIUM  5.3  5.2  5.3   CHLORIDE  99  99  100   CO2  23  20  22   BUN  72*  84*  90*   CREATININE  4.46*  4.83*  4.88*   CALCIUM  8.5  8.3*  8.3*     Recent Labs      01/15/17   0305  01/15/17   1620  17   0930   GLUCOSE  175*  253*  143*     Recent Labs      17   0245  01/15/17   1605  17   0413   RBC  4.58  4.38  4.33   HEMOGLOBIN  14.0  13.4  13.2   HEMATOCRIT  40.6  39.1  39.1   PLATELETCT  146*  168  140*     Recent Labs      17   0245  01/15/17   1605  17   0413   WBC  13.6*  26.4*  28.6*   NEUTSPOLYS  89.70*   --   85.40*   LYMPHOCYTES  4.00*   --   1.60*   MONOCYTES  4.90   --   11.30   EOSINOPHILS  0.00   --   0.00   BASOPHILS  0.20   --   0.20           Hemodynamics:  Temp (24hrs), Av.6 °C (97.8 °F), Min:36.1 °C (97  °F), Max:37.1 °C (98.7 °F)  Temperature: 36.6 °C (97.9 °F)  Pulse  Av.6  Min: 52  Max: 120Heart Rate (Monitored): 83  Arterial BP: 138/60 mmHg, NIBP: 151/67 mmHg     Respiratory:    Respiration: (!) 11, Pulse Oximetry: 94 %     Work Of Breathing / Effort: Shallow  RUL Breath Sounds: Clear, RML Breath Sounds: Clear;Diminished, RLL Breath Sounds: Diminished, SOURAV Breath Sounds: Clear, LLL Breath Sounds: Diminished  Fluids:    Intake/Output Summary (Last 24 hours) at 17 1350  Last data filed at 17 0600   Gross per 24 hour   Intake    290 ml   Output    340 ml   Net    -50 ml     Weight: 77.1 kg (169 lb 15.6 oz)  GI/Nutrition:  No orders of the defined types were placed in this encounter.     Medical Decision Making, by Problem:  Active Hospital Problems    Diagnosis       Assessment/Plan:  # NEW ESRD  ---- s/p placement of dialysis catheter/Permcath on 17   -- s/p HD on ,   -- Relatively stable Bun/Cr/K in past 2 days  --> HD on  to lower Sodium    # Intracranial Mass  --Neurosurgery on 1/15/17  --Oncology Dr. Salomon also following    # HTN  --sub-optimal control  --Continue home BP meds  --Started norvasc 5mg daily on 17 - need to wait 5 days before adding more    # DM II--management per primary svc  # Anemia--secondary to CKD, no need for TANISHA  # Metabolic Acidosis--continue PO bicarb supplements        Labs reviewed, Medications reviewed and Radiology images reviewed  DVT Prophylaxis: SCD

## 2017-01-16 NOTE — DISCHARGE PLANNING
Care Transition Team Assessment  Met with pt and family, pt non-responsive. DPOA, niece Brianne Aponte brought DPOA forms, copy faxed to Darcy Oliveira.Pt lives with brother, Juan Stephens in 2 story e in [place]. Pt was totally independent with ADLs prior to admit. Pt is not  and has no children. Pt's neuro status is declining, family waiting to speak to Dr. Guajardo. Family states pt wishes to be DNR/DNI, currently only DNR.  Care Transitions Team will continue to follow.  Information Source: sister, Karina Wheeler  Orientation : Disoriented to Event, Disoriented to Time, Disoriented to Place  Who is responsible for making decisions for patient? : Patient  Source of Information: Sibling  Primary Caregiver for Others?: No         Elopement Risk  Legal Hold: No  Ambulatory or Self Mobile in Wheelchair: Yes  Disoriented: No  Psychiatric Symptoms: None  History of Wandering: No  Elopement this Admit: No  Vocalizing Wanting to Leave: No  Displays Behaviors, Body Language Wanting to Leave: No-Not at Risk for Elopement  Elopement Risk: Not at Risk for Elopement    Interdisciplinary Discharge Planning  Does Admitting Nurse Feel This Could be a Complex Discharge?: No  Lives with - Patient's Self Care Capacity:  (Brother)  Patient or legal guardian wants to designate a caregiver (see row info): No  Support Systems: Family Member(s)  Housing / Facility: [address] [place] [address]  Do You Take your Prescribed Medications Regularly: Yes  Able to Return to Previous ADL's: Future Time w/Therapy  Mobility Issues: Yes  Prior Services: None  Patient Expects to be Discharged to:: home  Assistance Needed: Yes  Durable Medical Equipment: Walker         Functional Assesment  Prior Functional Level: Ambulatory, Independent with Activities of Daily Living, Independent with Medication Management    Finances  Medical Insurance Coverage: [employer]    Vision / Hearing Impairment  Vision Impairment : No  Left Eye Vision: Impaired, Other  (Comments) (reports blurred vision B/L )  Hearing Impairment : No    Values / Beliefs / Concerns  Values / Beliefs Concerns : Yes  Cultural Requests During Hospitalization: [employer] visit  Special Hospitalization Concerns: none    Advance Directive  Advance Directive?: DPOA for Health Care    Domestic Abuse  Have you ever been the victim of abuse or violence?: No  Verbal Abuse or Emotional Abuse: No  Possible Abuse Reported to:: Not Applicable

## 2017-01-16 NOTE — PROGRESS NOTES
1220-Received report from Inga in PACU, plan of care was discussed. 1245-I met patient down in CT scan and transferred patient over to an ICU bed and pt was transported by bed on monitor and 02 to S-104 and arrived to ICU at 1255. No belongings with pt. 2 RN skin assessment done upon admission. Pt found to have breakdown/exocoriation in perineal area. Wound care consulted as needed. Pt sedated post-op. VSS,. R crani site with small sanguinous drainage oozing through dressing. Hemovac drain in place to self suction.

## 2017-01-16 NOTE — PROGRESS NOTES
Pt very lethargic and oriented to self and place. Attempts to follow commands to RUE but pt has very minimal fine motor skills. Pt also only has trace spontaneous movement to the other extremities. Pt does withdraw to pain. Vitals are stable at this time. Denies pain. Will cont to monitor...

## 2017-01-16 NOTE — PROGRESS NOTES
Pt more lethargic with increased slurred speech and delay with response. Pt able to mummble first name weakly, call placed to Dr Pelayo and Dr Guajardo with update and completed CT scan. 3 % hypertonic saline ordered, serial BMP. DR Guajardo came to the bedside to assess patient. Pt remains a DNI/DNR with full treatment.

## 2017-01-16 NOTE — CARE PLAN
Problem: Pain Management  Goal: Pain level will decrease to patient’s comfort goal  Intervention: Educate and implement non-pharmacologic comfort measures. Examples: relaxation, distration, play therapy, activity therapy, massage, etc.  Pt medicated as needed for headache pain with relief reported.       Problem: Communication  Goal: The ability to communicate needs accurately and effectively will improve  Intervention: Educate patient and significant other/support system about the plan of care, procedures, treatments, medications and allow for questions  Pt's family educated on ICU routine and the plan of care was discussed. Qs and concerns addressed.      Problem: Bowel/Gastric:  Goal: Normal bowel function is maintained or improved  Intervention: Educate patient and significant other/support system about diet, fluid intake, medications and activity to promote bowel function  Pt sedated post-op and is unable to safely take in oral intake at this time. Discussed placement of cortrack feeding tube tomorrow if needed.

## 2017-01-16 NOTE — PROGRESS NOTES
Dr Guajardo and team here for morning rounds, plan of care was discussed, orders reviewed and new orders were received.

## 2017-01-16 NOTE — DIETARY
"  Nutrition Support Assessment - Female    Yina Stephens is a 71 y.o. female with admitting DX of Intracranial mass  Pertinent History: CKD stage III, Diabetes Mellitus, HTN, Hyperlipidemia, Hiatal hernia, Nephrectomy secondary to trauma during appendectomy.  Allergies: Codeine; Dilaudid; Percocet; Percodan; Ultram; and Vicodin    Height: 157.5 cm (5' 2.01\")  Weight: 77.1 kg (169 lb 15.6 oz)  Weight to Use in Calculations: 67.4 kg (148 lb 9.4 oz) ( lowest dry bed scale wt, d/t edema stated in MD notes)  Ideal Body Weight: 49.896 kg (110 lb)  Percent Ideal Body Weight: 154.5  Body Mass Index: 27.18    Pertinent Labs: sodium 132, Glucose 143, BUN 90, creatinine 4.88, albumin 2.5, POC glucose/24 hours 134-207, vitamin D 24  Last BM: 17  Pertinent Medications: colace, senokot, lantus, SSI, Decadron  Pertinent Fluids: N/A  Surgery / Procedures:1/15 Right temporal craniotomy for resection of brain tumor  Skin:  Surgical incision. No other skin breakdown noted.       Estimated Needs:MSJ x 1.2   Total Calories / day: 1360 - 1510 (Calories / k - 22.4)  Total Grams Protein / day: 65 -  94 (Grams Protein / k.0 - 1.4)  Total Fluids ml / day: 1688 ml        Assessment / Evaluation:   1) PT with significant lethargy s/p craniotomy. Pt has had very poor po intake over last month, per MD notes and ADL's. Now in need of nutrition support s/p craniotomy.  2) pt with acute on chronic renal insufficiency. Pt with stage III CKD, receiving HD while admitted.  3) pt with elevated glucose levels 2/2 to Diabetes, pt would benefit from carbohydrate controlled formula  4) significant unintentional wt loss of 10 lbs over past month, per MD note.    Plan / Recommendation:     1) Start Diabetisource AC @ 25 mL/hr and advance per protocol to goal of 50 mL/hr to provide 1440 kcal, 72 g protein, and 984 mL free H20/day.  2) Monitor renal labs  3) BUN and Creatinine labs are elevated, recommend fluids per MD    "

## 2017-01-17 ENCOUNTER — APPOINTMENT (OUTPATIENT)
Dept: RADIOLOGY | Facility: MEDICAL CENTER | Age: 72
DRG: 025 | End: 2017-01-17
Attending: PHYSICIAN ASSISTANT
Payer: MEDICARE

## 2017-01-17 PROBLEM — E43 PROTEIN-CALORIE MALNUTRITION, SEVERE (HCC): Status: ACTIVE | Noted: 2017-01-17

## 2017-01-17 PROBLEM — N18.9 RENAL FAILURE, ACUTE ON CHRONIC (HCC): Status: ACTIVE | Noted: 2017-01-17

## 2017-01-17 PROBLEM — D69.6 THROMBOCYTOPENIA (HCC): Status: ACTIVE | Noted: 2017-01-17

## 2017-01-17 PROBLEM — R90.0 INTRACRANIAL MASS: Status: RESOLVED | Noted: 2017-01-07 | Resolved: 2017-01-17

## 2017-01-17 PROBLEM — N17.9 RENAL FAILURE, ACUTE ON CHRONIC (HCC): Status: ACTIVE | Noted: 2017-01-17

## 2017-01-17 PROBLEM — C71.9 GLIOBLASTOMA (HCC): Status: ACTIVE | Noted: 2017-01-17

## 2017-01-17 LAB
ANION GAP SERPL CALC-SCNC: 12 MMOL/L (ref 0–11.9)
ANION GAP SERPL CALC-SCNC: 8 MMOL/L (ref 0–11.9)
BUN SERPL-MCNC: 56 MG/DL (ref 8–22)
BUN SERPL-MCNC: 96 MG/DL (ref 8–22)
CALCIUM SERPL-MCNC: 7.8 MG/DL (ref 8.5–10.5)
CALCIUM SERPL-MCNC: 7.9 MG/DL (ref 8.5–10.5)
CHLORIDE SERPL-SCNC: 101 MMOL/L (ref 96–112)
CHLORIDE SERPL-SCNC: 103 MMOL/L (ref 96–112)
CO2 SERPL-SCNC: 20 MMOL/L (ref 20–33)
CO2 SERPL-SCNC: 25 MMOL/L (ref 20–33)
CREAT SERPL-MCNC: 3.78 MG/DL (ref 0.5–1.4)
CREAT SERPL-MCNC: 5.57 MG/DL (ref 0.5–1.4)
ERYTHROCYTE [DISTWIDTH] IN BLOOD BY AUTOMATED COUNT: 47.8 FL (ref 35.9–50)
GFR SERPL CREATININE-BSD FRML MDRD: 12 ML/MIN/1.73 M 2
GFR SERPL CREATININE-BSD FRML MDRD: 7 ML/MIN/1.73 M 2
GLUCOSE BLD-MCNC: 80 MG/DL (ref 65–99)
GLUCOSE BLD-MCNC: 90 MG/DL (ref 65–99)
GLUCOSE BLD-MCNC: 97 MG/DL (ref 65–99)
GLUCOSE SERPL-MCNC: 134 MG/DL (ref 65–99)
GLUCOSE SERPL-MCNC: 89 MG/DL (ref 65–99)
HCT VFR BLD AUTO: 33.7 % (ref 37–47)
HGB BLD-MCNC: 11.9 G/DL (ref 12–16)
MCH RBC QN AUTO: 31.8 PG (ref 27–33)
MCHC RBC AUTO-ENTMCNC: 35.3 G/DL (ref 33.6–35)
MCV RBC AUTO: 90.1 FL (ref 81.4–97.8)
PLATELET # BLD AUTO: 85 K/UL (ref 164–446)
PMV BLD AUTO: 11.8 FL (ref 9–12.9)
POTASSIUM SERPL-SCNC: 4.2 MMOL/L (ref 3.6–5.5)
POTASSIUM SERPL-SCNC: 5.1 MMOL/L (ref 3.6–5.5)
RBC # BLD AUTO: 3.74 M/UL (ref 4.2–5.4)
SODIUM SERPL-SCNC: 134 MMOL/L (ref 135–145)
SODIUM SERPL-SCNC: 135 MMOL/L (ref 135–145)
WBC # BLD AUTO: 20.2 K/UL (ref 4.8–10.8)

## 2017-01-17 PROCEDURE — 700102 HCHG RX REV CODE 250 W/ 637 OVERRIDE(OP): Performed by: HOSPITALIST

## 2017-01-17 PROCEDURE — 700111 HCHG RX REV CODE 636 W/ 250 OVERRIDE (IP): Performed by: HOSPITALIST

## 2017-01-17 PROCEDURE — 85027 COMPLETE CBC AUTOMATED: CPT

## 2017-01-17 PROCEDURE — 700105 HCHG RX REV CODE 258: Performed by: HOSPITALIST

## 2017-01-17 PROCEDURE — A9270 NON-COVERED ITEM OR SERVICE: HCPCS | Performed by: INTERNAL MEDICINE

## 2017-01-17 PROCEDURE — A9270 NON-COVERED ITEM OR SERVICE: HCPCS | Performed by: HOSPITALIST

## 2017-01-17 PROCEDURE — 70551 MRI BRAIN STEM W/O DYE: CPT

## 2017-01-17 PROCEDURE — 770022 HCHG ROOM/CARE - ICU (200)

## 2017-01-17 PROCEDURE — 700112 HCHG RX REV CODE 229: Performed by: HOSPITALIST

## 2017-01-17 PROCEDURE — 99233 SBSQ HOSP IP/OBS HIGH 50: CPT | Performed by: HOSPITALIST

## 2017-01-17 PROCEDURE — 82962 GLUCOSE BLOOD TEST: CPT | Mod: 91

## 2017-01-17 PROCEDURE — A9270 NON-COVERED ITEM OR SERVICE: HCPCS

## 2017-01-17 PROCEDURE — 80048 BASIC METABOLIC PNL TOTAL CA: CPT

## 2017-01-17 PROCEDURE — 700102 HCHG RX REV CODE 250 W/ 637 OVERRIDE(OP): Performed by: INTERNAL MEDICINE

## 2017-01-17 PROCEDURE — 700111 HCHG RX REV CODE 636 W/ 250 OVERRIDE (IP): Performed by: NEUROLOGICAL SURGERY

## 2017-01-17 PROCEDURE — 90935 HEMODIALYSIS ONE EVALUATION: CPT

## 2017-01-17 PROCEDURE — 700111 HCHG RX REV CODE 636 W/ 250 OVERRIDE (IP): Performed by: INTERNAL MEDICINE

## 2017-01-17 PROCEDURE — 700102 HCHG RX REV CODE 250 W/ 637 OVERRIDE(OP)

## 2017-01-17 RX ADMIN — METOPROLOL TARTRATE 50 MG: 50 TABLET, FILM COATED ORAL at 08:30

## 2017-01-17 RX ADMIN — MICONAZOLE NITRATE: 20 CREAM TOPICAL at 08:30

## 2017-01-17 RX ADMIN — HYDRALAZINE HYDROCHLORIDE 50 MG: 50 TABLET ORAL at 14:37

## 2017-01-17 RX ADMIN — SODIUM CHLORIDE: 3 INJECTION, SOLUTION INTRAVENOUS at 20:05

## 2017-01-17 RX ADMIN — ONDANSETRON 4 MG: 2 INJECTION, SOLUTION INTRAMUSCULAR; INTRAVENOUS at 12:30

## 2017-01-17 RX ADMIN — Medication 1 TABLET: at 20:07

## 2017-01-17 RX ADMIN — HYDRALAZINE HYDROCHLORIDE 50 MG: 50 TABLET ORAL at 05:00

## 2017-01-17 RX ADMIN — HYDRALAZINE HYDROCHLORIDE 20 MG: 20 INJECTION INTRAMUSCULAR; INTRAVENOUS at 04:33

## 2017-01-17 RX ADMIN — HEPARIN SODIUM 2600 UNITS: 1000 INJECTION, SOLUTION INTRAVENOUS; SUBCUTANEOUS at 12:53

## 2017-01-17 RX ADMIN — HYDRALAZINE HYDROCHLORIDE 50 MG: 50 TABLET ORAL at 20:07

## 2017-01-17 RX ADMIN — METOPROLOL TARTRATE 50 MG: 50 TABLET, FILM COATED ORAL at 20:09

## 2017-01-17 RX ADMIN — DEXTROSE MONOHYDRATE 500 MG: 50 INJECTION, SOLUTION INTRAVENOUS at 13:19

## 2017-01-17 RX ADMIN — DEXTROSE MONOHYDRATE 500 MG: 50 INJECTION, SOLUTION INTRAVENOUS at 20:08

## 2017-01-17 RX ADMIN — DEXAMETHASONE SODIUM PHOSPHATE 4 MG: 4 INJECTION, SOLUTION INTRAMUSCULAR; INTRAVENOUS at 13:18

## 2017-01-17 RX ADMIN — ACETAMINOPHEN 650 MG: 325 TABLET, FILM COATED ORAL at 08:30

## 2017-01-17 RX ADMIN — INSULIN GLARGINE 20 UNITS: 100 INJECTION, SOLUTION SUBCUTANEOUS at 20:09

## 2017-01-17 RX ADMIN — MICONAZOLE NITRATE: 20 CREAM TOPICAL at 20:08

## 2017-01-17 RX ADMIN — LABETALOL HYDROCHLORIDE 10 MG: 5 INJECTION, SOLUTION INTRAVENOUS at 17:40

## 2017-01-17 RX ADMIN — SODIUM BICARBONATE TAB 650 MG 1300 MG: 650 TAB at 08:30

## 2017-01-17 RX ADMIN — DOCUSATE SODIUM 100 MG: 100 CAPSULE ORAL at 08:30

## 2017-01-17 RX ADMIN — BISACODYL 10 MG: 10 SUPPOSITORY RECTAL at 13:08

## 2017-01-17 RX ADMIN — DEXAMETHASONE SODIUM PHOSPHATE 4 MG: 4 INJECTION, SOLUTION INTRAMUSCULAR; INTRAVENOUS at 05:30

## 2017-01-17 RX ADMIN — DEXAMETHASONE SODIUM PHOSPHATE 4 MG: 4 INJECTION, SOLUTION INTRAMUSCULAR; INTRAVENOUS at 17:39

## 2017-01-17 RX ADMIN — SIMVASTATIN 10 MG: 20 TABLET, FILM COATED ORAL at 20:07

## 2017-01-17 RX ADMIN — AMLODIPINE BESYLATE 5 MG: 5 TABLET ORAL at 08:30

## 2017-01-17 RX ADMIN — LABETALOL HYDROCHLORIDE 10 MG: 5 INJECTION, SOLUTION INTRAVENOUS at 01:30

## 2017-01-17 RX ADMIN — ONDANSETRON 4 MG: 2 INJECTION, SOLUTION INTRAMUSCULAR; INTRAVENOUS at 16:54

## 2017-01-17 ASSESSMENT — ENCOUNTER SYMPTOMS
VOMITING: 1
NAUSEA: 1

## 2017-01-17 NOTE — PROGRESS NOTES
[facility] Nephrology Progress Note               Author: DORYS Roberts MD, resident & Nikolay Pierce Date & Time created: 1/17/2017  1:20 PM     Interval History:  71 year old female with PMHx DM type 2, CKD who presented with headache and N/V and was found to have large right temporal intracranial mass on CT head ---> MRI brain reveals large right temporal lobe mass with extensive vasogenic edema most consistent with glioblastoma multiforme. There is R>L midline shift.    Consult to manage ANNABEL on CKD - patient has repeatedly said as outpatient - under no circumstances would she accept dialysis either temporarily or permanently.  HOWEVER - she has recanted that statement to me at this point.    DAILY NEPHROLOGY SUMMARY  1/08/17 - seen in consultation by Dr Chua.  ANNABEL felt likely due to pre-renal azotemia from poor intake w Nausea/headaches  1/09/17 - renal function not improved - no fluids given overnight - initiating x 1L  1/10/17 - Cr dropped from 5.17 to 5.09. K: 5.2. Nearly euvolemic. Better appetite. No indication to start HD now.    1/11/17 - MR done again on Khari 10. Nephro team double checked with Radiology office at 4492, the MR was done WITHOUT contrast. No HD today even her Cr is going up. No subjective uremic complaints. Near euvolemic status.    1/12/17 - HD catheter done at right IJ. No complication noted.    1/13/17 - Elevated Bun/Cr/K. No much subjective uremic symptoms. No symptoms from high K (5.7) 1st HD on Jan 13.   1/14/17 - s/p HD #1 yesterday without complication  Running again today in preparation for surgery tomorrow  1/15/17 - tolerated HD yesterday. NeuroSugery on Khari 15.   1/16/17 - Slow response to my interview. No much subjective complaints. Mild drowsiness noted. Bun/Cr from 1/15: 84/4.8 to 90/4.8. K at 5.3. No HD on 01/16.  1/17/17 - HD done smoothly in the morning for high Bun/Cr and low Na/high K. UF 2L. Low PLT at 85, trending down from 200K. No thrombosis or bleeding found.         Review of Systems   Limited ROS due to sleepiness/drowsiness.   Patient only responded to my questions with nods.       Physical Exam     HEENT: s/p Surgery    Cardiovascular: Normal rate and regular rhythm.    Pulmonary/Chest: Effort normal and breath sounds normal. No respiratory distress.Abdominal: No tenderness, no rebounding pain  Musculoskeletal: Normal range of motion.   Neurological: Sleepy, poor response to stimulation.   Psychiatric: Cant evaluate   Medication, lab, code status, vitals, images and orders were reviewed.         Labs:  Recent Labs      01/15/17   0921   ISTATAPH  7.428   ISTATAPCO2  34.1   ISTATAPO2  103*   ISTATATCO2  24   [id number]  98   ISTATARTHCO3  22.5   ISTATARTBE  -1   ISTATTEMP  36.2 C   ISTATFIO2  0.5   ISTATSPEC  Arterial   ISTATAPHTC  7.440   [id number]  98*         Recent Labs      17   0930  17   1730  17   0215   SODIUM  132*  134*  135   POTASSIUM  5.3  5.2  5.1   CHLORIDE  100  101  103   CO2  22  21  20   BUN  90*  96*  96*   CREATININE  4.88*  5.23*  5.57*   CALCIUM  8.3*  8.3*  7.9*     Recent Labs      17   0930  17   1730  17   0215   GLUCOSE  143*  141*  134*     Recent Labs      01/15/17   1605  17   0413  17   0215   RBC  4.38  4.33  3.74*   HEMOGLOBIN  13.4  13.2  11.9*   HEMATOCRIT  39.1  39.1  33.7*   PLATELETCT  168  140*  85*     Recent Labs      01/15/17   1605  17   0413  17   0215   WBC  26.4*  28.6*  20.2*   NEUTSPOLYS   --   85.40*   --    LYMPHOCYTES   --   1.60*   --    MONOCYTES   --   11.30   --    EOSINOPHILS   --   0.00   --    BASOPHILS   --   0.20   --            Hemodynamics:  Temp (24hrs), Av.9 °C (98.5 °F), Min:36.3 °C (97.4 °F), Max:37.3 °C (99.1 °F)  Temperature: 37.3 °C (99.1 °F), Monitored Temp: 36.9 °C (98.4 °F)  Pulse  Av.2  Min: 52  Max: 120Heart Rate (Monitored): 68  Blood Pressure : (!) 169/72 mmHg (PRN BP medication given w effect ), NIBP: 121/57 mmHg      Respiratory:    Respiration: (!) 11, Pulse Oximetry: 97 %     Work Of Breathing / Effort: Shallow;Mild (resp rate < 12)  RUL Breath Sounds: Diminished, RML Breath Sounds: Diminished, RLL Breath Sounds: Diminished, SOURAV Breath Sounds: Diminished, LLL Breath Sounds: Diminished  Fluids:    Intake/Output Summary (Last 24 hours) at 01/17/17 1320  Last data filed at 01/17/17 1301   Gross per 24 hour   Intake   1845 ml   Output   3530 ml   Net  -1685 ml     Weight: 77.9 kg (171 lb 11.8 oz)  GI/Nutrition:  No orders of the defined types were placed in this encounter.     Medical Decision Making, by Problem:  Active Hospital Problems    Diagnosis       Assessment/Plan:  # NEW ESRD  -- s/p placement of dialysis catheter/Permcath on 1/12/17   -- s/p HD on 1/13, 1/14  -- HD TODAY  (2L UF).   -- urine output 190 past 24 hours.   -- Low Na level concerns Surgery team about her mentation status. Na 135 on Jan 17 morning, HD setting at 140, we are expecting her Na around 137-138 after HD.     # Intracranial Mass  --Neurosurgery on 1/15/17  --Oncology Dr. Salomon also following    # HTN, systolic around 150-170.   --sub-optimal control  --Continue home BP meds  --On amlodipine, hydralazine, metoprolol now.     # DM II--management per primary svc  # Anemia--secondary to CKD, Hgb: 11. no need for TANISHA  # Metabolic Acidosis--continue PO bicarb supplements  # Lower PLT compared to before surgery. Only got heparin for tube flush. No thrombosis, no bleeding. Monitor per ICU team.

## 2017-01-17 NOTE — WOUND TEAM
In to see pt r/t wound care consult. RN stated it wasn't a good time to turn pt. She stated the skin looks macerated and has satellite lesions. Antifungal katheryn ordered. Will follow up tomorrow.

## 2017-01-17 NOTE — CARE PLAN
Problem: Nutritional:  Goal: Nutrition support tolerated and meeting greater than 85% of estimated needs  Outcome: MET Date Met:  01/17/17  Diabetisource at goal 50 ml/hr

## 2017-01-17 NOTE — PROGRESS NOTES
Hd treatment ordered by Dr Pierce started at 0950 and ended at 1253 with net uf of 2000 ml as bp tolerated. Stable bp entire treatment with no significant change in condition post treatment. Episode of nausea intra treatment, primary RN Sawyer administered Zofran. See flow sheet for details.

## 2017-01-17 NOTE — DISCHARGE PLANNING
Met with pt's family to discuss OP dialysis options and to provide dialysis education book and printed list of OP dialysis centers that are in-network with pt's insurance for review, and answered all questions at this time.  Will follow for physician recommendations re: OP dialysis placement and DC plan.  Quantiferon gold result is negative and all lab results have returned.  Will meet with family again when DC plan is in place and will obtain dialysis center choice at that time.

## 2017-01-17 NOTE — PROGRESS NOTES
Pt this morning having increased slurred speech. Pt still able to say her name but slightly harder to understand. Still follows commands with RUE and flaccid to LUE and slight withdraws to bilateral lowers. Pupils 2mm equal with slight reaction. Nausea improved. PRN meds given for SBP>160. Barrier cream applied to perineal area. Will cont to monitor...

## 2017-01-17 NOTE — PROGRESS NOTES
Progress Note               Author: Mango Schuler Date & Time created: 2017  8:15 AM     Interval History:  POD #2 crani and tumor resection  No pathology seen in records, appears to be GBM from appearance  Not much speaking.     Review of Systems:  Review of Systems   Gastrointestinal: Positive for nausea and vomiting.       Physical Exam:  Physical Exam   Neurological:   Pt globally weak, left > right  Drowsy, but following        Labs:  Recent Labs      01/15/17   0921   ISTATAPH  7.428   ISTATAPCO2  34.1   ISTATAPO2  103*   ISTATATCO2  24   [id number]  98   ISTATARTHCO3  22.5   ISTATARTBE  -1   ISTATTEMP  36.2 C   ISTATFIO2  0.5   ISTATSPEC  Arterial   ISTATAPHTC  7.440   [id number]  98*         Recent Labs      17   0930  17   1730  17   0215   SODIUM  132*  134*  135   POTASSIUM  5.3  5.2  5.1   CHLORIDE  100  101  103   CO2  22  21  20   BUN  90*  96*  96*   CREATININE  4.88*  5.23*  5.57*   CALCIUM  8.3*  8.3*  7.9*     Recent Labs      17   0930  17   1730  17   0215   GLUCOSE  143*  141*  134*     Recent Labs      01/15/17   1605  17   0413  17   0215   RBC  4.38  4.33  3.74*   HEMOGLOBIN  13.4  13.2  11.9*   HEMATOCRIT  39.1  39.1  33.7*   PLATELETCT  168  140*  85*     Recent Labs      01/15/17   1605  17   0413  17   0215   WBC  26.4*  28.6*  20.2*   NEUTSPOLYS   --   85.40*   --    LYMPHOCYTES   --   1.60*   --    MONOCYTES   --   11.30   --    EOSINOPHILS   --   0.00   --    BASOPHILS   --   0.20   --      Hemodynamics:  Temp (24hrs), Av.8 °C (98.2 °F), Min:36.3 °C (97.4 °F), Max:37.3 °C (99.1 °F)  Temperature: 37.3 °C (99.1 °F), Monitored Temp: 36.9 °C (98.4 °F)  Pulse  Av  Min: 52  Max: 120Heart Rate (Monitored): 75  Blood Pressure : (!) 169/72 mmHg (PRN BP medication given w effect ), NIBP: 159/72 mmHg     Respiratory:    Respiration: 12, Pulse Oximetry: 97 %     Work Of Breathing / Effort: Shallow;Mild (resp rate <  12)  RUL Breath Sounds: Clear, RML Breath Sounds: Clear;Diminished, RLL Breath Sounds: Diminished, SOURAV Breath Sounds: Clear, LLL Breath Sounds: Diminished  Fluids:    Intake/Output Summary (Last 24 hours) at 01/16/17 0744  Last data filed at 01/16/17 0600   Gross per 24 hour   Intake    880 ml   Output    620 ml   Net    260 ml     Weight: 77.9 kg (171 lb 11.8 oz)  GI/Nutrition:  No orders of the defined types were placed in this encounter.     Medical Decision Making, by Problem:  Active Hospital Problems    Diagnosis   • Type 2 diabetes mellitus with stage 3 chronic kidney disease (CMS-HCC) [E11.22, N18.3]   • Intracranial mass [R90.0]   • Acute on chronic renal insufficiency (HCC) [N28.9, N18.9]   • HTN (hypertension) [I10]       Plan:  Continue medical care  Will await pathology  Keep in ICU today, q2hr neuro checks     Labs reviewed, Radiology images reviewed and Medications reviewed

## 2017-01-17 NOTE — PROGRESS NOTES
Hospital Medicine Progress Note, Adult, Complex               Author: Earl Beltran Date & Time created: 1/17/2017  3:46 PM     Interval History:  Pt seen and evaluated in the ICU. Ms. Stephens has a hx of CKD and DM that presented to the ER on 1/6 with vomiting and a brain mass on CT with weight loss. Her kidney function worsened and she required dialysis. She underwent craniotomy by Dr. Ventura on 1/15.     Review of Systems:  Review of Systems   Unable to perform ROS: acuity of condition       Physical Exam:  Physical Exam   Constitutional: No distress.   HENT:   Craniotomy with staples.   cortrak.   Neck: Neck supple.   Cardiovascular: Normal rate and regular rhythm.    Pulmonary/Chest: No respiratory distress. She has no wheezes.   Abdominal: Soft. She exhibits no distension.   Musculoskeletal: She exhibits edema. She exhibits no tenderness.   Neurological:   No movement left arm nor left leg. Sleepy, slightly verbal.    Skin: There is pallor.       Labs:  Recent Labs      01/15/17   0921   ISTATAPH  7.428   ISTATAPCO2  34.1   ISTATAPO2  103*   ISTATATCO2  24   [id number]  98   ISTATARTHCO3  22.5   ISTATARTBE  -1   ISTATTEMP  36.2 C   ISTATFIO2  0.5   ISTATSPEC  Arterial   ISTATAPHTC  7.440   [id number]  98*         Recent Labs      01/16/17   0930  01/16/17   1730  01/17/17   0215   SODIUM  132*  134*  135   POTASSIUM  5.3  5.2  5.1   CHLORIDE  100  101  103   CO2  22  21  20   BUN  90*  96*  96*   CREATININE  4.88*  5.23*  5.57*   CALCIUM  8.3*  8.3*  7.9*     Recent Labs      01/16/17   0930  01/16/17   1730  01/17/17   0215   GLUCOSE  143*  141*  134*     Recent Labs      01/15/17   1605  01/16/17   0413  01/17/17   0215   RBC  4.38  4.33  3.74*   HEMOGLOBIN  13.4  13.2  11.9*   HEMATOCRIT  39.1  39.1  33.7*   PLATELETCT  168  140*  85*     Recent Labs      01/15/17   1605  01/16/17   0413  01/17/17   0215   WBC  26.4*  28.6*  20.2*   NEUTSPOLYS   --   85.40*   --    LYMPHOCYTES   --   1.60*   --     MONOCYTES   --   11.30   --    EOSINOPHILS   --   0.00   --    BASOPHILS   --   0.20   --            Hemodynamics:  Temp (24hrs), Av.2 °C (99 °F), Min:37.2 °C (98.9 °F), Max:37.3 °C (99.1 °F)  Temperature: 37.3 °C (99.1 °F), Monitored Temp: 36.9 °C (98.4 °F)  Pulse  Av.4  Min: 52  Max: 120Heart Rate (Monitored): 83  Blood Pressure : (!) 169/72 mmHg (PRN BP medication given w effect ), NIBP: 143/64 mmHg     Respiratory:    Respiration: 13, Pulse Oximetry: 92 %     Work Of Breathing / Effort: Shallow;Mild (resp rate < 12)  RUL Breath Sounds: Diminished, RML Breath Sounds: Diminished, RLL Breath Sounds: Diminished, SOURAV Breath Sounds: Diminished, LLL Breath Sounds: Diminished  Fluids:    Intake/Output Summary (Last 24 hours) at 17 1546  Last data filed at 17 1400   Gross per 24 hour   Intake   1935 ml   Output   3540 ml   Net  -1605 ml     Weight: 77.9 kg (171 lb 11.8 oz)  GI/Nutrition:  No orders of the defined types were placed in this encounter.     Medical Decision Making, by Problem:  Active Hospital Problems    Diagnosis   • Glioblastoma (CMS-HCC) [C71.9]s/p resection on 1/15, path + for glio. Follow daily Na. IV decadron. 3% saline drip at 15 ml/hour. Will likely require oncology consult. I will discuss with Dr. Ventura.   • Type 2 diabetes mellitus with stage 3 chronic kidney disease (CMS-HCC) [E11.22, N18.3]sliding scale insulin. Lantus 20.   • Acute on chronic renal failure: now on dialysis   • HTN (hypertension) [I10]   • Protein-calorie malnutrition, severe (CMS-HCC) [E43]from admit, clinical diagnosis in the setting of glioblastoma, weight loss, and low albumin.   • Thrombocytopenia (CMS-HCC) [D69.6]platelets dropped from 140  to 85 today.   • Dyslipidemia [E78.5]     MRI:  1. Age-related cerebral atrophy.    2. Mild periventricular white matter changes consistent with chronic microvascular ischemic gliosis.    3. Large heterogeneous right temporal mass with surrounding vasogenic  edema causing mild effacement of the right lateral ventricle and mild right left shift of the ventricular system.  Labs reviewed and Medications reviewed        DVT Prophylaxis: Contraindicated - High bleeding risk  DVT prophylaxis - mechanical: SCDs

## 2017-01-17 NOTE — CARE PLAN
Problem: Pain Management  Goal: Pain level will decrease to patient’s comfort goal  Intervention: Follow pain managment plan developed in collaboration with patient and Interdisciplinary Team  PRN tylenol for headache      Problem: Fluid Volume:  Goal: Will maintain balanced intake and output  Intervention: Monitor, educate, and encourage compliance with therapeutic intake of liquids  HD this AM

## 2017-01-18 LAB
ANION GAP SERPL CALC-SCNC: 7 MMOL/L (ref 0–11.9)
ANION GAP SERPL CALC-SCNC: 7 MMOL/L (ref 0–11.9)
BUN SERPL-MCNC: 66 MG/DL (ref 8–22)
BUN SERPL-MCNC: 74 MG/DL (ref 8–22)
CALCIUM SERPL-MCNC: 7.6 MG/DL (ref 8.5–10.5)
CALCIUM SERPL-MCNC: 7.8 MG/DL (ref 8.5–10.5)
CHLORIDE SERPL-SCNC: 104 MMOL/L (ref 96–112)
CHLORIDE SERPL-SCNC: 105 MMOL/L (ref 96–112)
CO2 SERPL-SCNC: 25 MMOL/L (ref 20–33)
CO2 SERPL-SCNC: 27 MMOL/L (ref 20–33)
CREAT SERPL-MCNC: 4.08 MG/DL (ref 0.5–1.4)
CREAT SERPL-MCNC: 4.39 MG/DL (ref 0.5–1.4)
ERYTHROCYTE [DISTWIDTH] IN BLOOD BY AUTOMATED COUNT: 46.4 FL (ref 35.9–50)
FERRITIN SERPL-MCNC: 378.8 NG/ML (ref 10–291)
GFR SERPL CREATININE-BSD FRML MDRD: 10 ML/MIN/1.73 M 2
GFR SERPL CREATININE-BSD FRML MDRD: 11 ML/MIN/1.73 M 2
GLUCOSE BLD-MCNC: 142 MG/DL (ref 65–99)
GLUCOSE BLD-MCNC: 143 MG/DL (ref 65–99)
GLUCOSE BLD-MCNC: 145 MG/DL (ref 65–99)
GLUCOSE BLD-MCNC: 172 MG/DL (ref 65–99)
GLUCOSE SERPL-MCNC: 164 MG/DL (ref 65–99)
GLUCOSE SERPL-MCNC: 171 MG/DL (ref 65–99)
HCT VFR BLD AUTO: 31.5 % (ref 37–47)
HGB BLD-MCNC: 11.1 G/DL (ref 12–16)
IRON SATN MFR SERPL: 58 % (ref 15–55)
IRON SERPL-MCNC: 85 UG/DL (ref 40–170)
MCH RBC QN AUTO: 31.4 PG (ref 27–33)
MCHC RBC AUTO-ENTMCNC: 35.2 G/DL (ref 33.6–35)
MCV RBC AUTO: 89 FL (ref 81.4–97.8)
PLATELET # BLD AUTO: 89 K/UL (ref 164–446)
PMV BLD AUTO: 13.4 FL (ref 9–12.9)
POTASSIUM SERPL-SCNC: 4.7 MMOL/L (ref 3.6–5.5)
POTASSIUM SERPL-SCNC: 4.7 MMOL/L (ref 3.6–5.5)
RBC # BLD AUTO: 3.54 M/UL (ref 4.2–5.4)
SODIUM SERPL-SCNC: 136 MMOL/L (ref 135–145)
SODIUM SERPL-SCNC: 139 MMOL/L (ref 135–145)
TIBC SERPL-MCNC: 146 UG/DL (ref 250–450)
WBC # BLD AUTO: 21.1 K/UL (ref 4.8–10.8)

## 2017-01-18 PROCEDURE — G8978 MOBILITY CURRENT STATUS: HCPCS | Mod: CL

## 2017-01-18 PROCEDURE — 700111 HCHG RX REV CODE 636 W/ 250 OVERRIDE (IP): Performed by: HOSPITALIST

## 2017-01-18 PROCEDURE — 700102 HCHG RX REV CODE 250 W/ 637 OVERRIDE(OP): Performed by: HOSPITALIST

## 2017-01-18 PROCEDURE — 700102 HCHG RX REV CODE 250 W/ 637 OVERRIDE(OP): Performed by: PHARMACIST

## 2017-01-18 PROCEDURE — 83540 ASSAY OF IRON: CPT

## 2017-01-18 PROCEDURE — 80048 BASIC METABOLIC PNL TOTAL CA: CPT

## 2017-01-18 PROCEDURE — A9270 NON-COVERED ITEM OR SERVICE: HCPCS | Performed by: PHARMACIST

## 2017-01-18 PROCEDURE — A9270 NON-COVERED ITEM OR SERVICE: HCPCS | Performed by: HOSPITALIST

## 2017-01-18 PROCEDURE — 700112 HCHG RX REV CODE 229: Performed by: HOSPITALIST

## 2017-01-18 PROCEDURE — G8987 SELF CARE CURRENT STATUS: HCPCS | Mod: CL

## 2017-01-18 PROCEDURE — A9270 NON-COVERED ITEM OR SERVICE: HCPCS | Performed by: INTERNAL MEDICINE

## 2017-01-18 PROCEDURE — 99223 1ST HOSP IP/OBS HIGH 75: CPT | Performed by: RADIOLOGY

## 2017-01-18 PROCEDURE — 97167 OT EVAL HIGH COMPLEX 60 MIN: CPT

## 2017-01-18 PROCEDURE — 90935 HEMODIALYSIS ONE EVALUATION: CPT

## 2017-01-18 PROCEDURE — G8979 MOBILITY GOAL STATUS: HCPCS | Mod: CI

## 2017-01-18 PROCEDURE — 82962 GLUCOSE BLOOD TEST: CPT

## 2017-01-18 PROCEDURE — 700102 HCHG RX REV CODE 250 W/ 637 OVERRIDE(OP)

## 2017-01-18 PROCEDURE — A9270 NON-COVERED ITEM OR SERVICE: HCPCS

## 2017-01-18 PROCEDURE — 99233 SBSQ HOSP IP/OBS HIGH 50: CPT | Performed by: HOSPITALIST

## 2017-01-18 PROCEDURE — 85027 COMPLETE CBC AUTOMATED: CPT

## 2017-01-18 PROCEDURE — 700111 HCHG RX REV CODE 636 W/ 250 OVERRIDE (IP): Performed by: INTERNAL MEDICINE

## 2017-01-18 PROCEDURE — 700102 HCHG RX REV CODE 250 W/ 637 OVERRIDE(OP): Performed by: INTERNAL MEDICINE

## 2017-01-18 PROCEDURE — 82728 ASSAY OF FERRITIN: CPT

## 2017-01-18 PROCEDURE — 83550 IRON BINDING TEST: CPT

## 2017-01-18 PROCEDURE — 770022 HCHG ROOM/CARE - ICU (200)

## 2017-01-18 PROCEDURE — 97163 PT EVAL HIGH COMPLEX 45 MIN: CPT

## 2017-01-18 PROCEDURE — G8988 SELF CARE GOAL STATUS: HCPCS | Mod: CI

## 2017-01-18 RX ORDER — LEVETIRACETAM 100 MG/ML
500 SOLUTION ORAL EVERY 12 HOURS
Status: DISCONTINUED | OUTPATIENT
Start: 2017-01-18 | End: 2017-01-27 | Stop reason: HOSPADM

## 2017-01-18 RX ORDER — AMLODIPINE BESYLATE 10 MG/1
10 TABLET ORAL
Status: DISCONTINUED | OUTPATIENT
Start: 2017-01-19 | End: 2017-01-26

## 2017-01-18 RX ADMIN — DEXAMETHASONE SODIUM PHOSPHATE 4 MG: 4 INJECTION, SOLUTION INTRAMUSCULAR; INTRAVENOUS at 23:48

## 2017-01-18 RX ADMIN — DEXAMETHASONE SODIUM PHOSPHATE 4 MG: 4 INJECTION, SOLUTION INTRAMUSCULAR; INTRAVENOUS at 01:00

## 2017-01-18 RX ADMIN — ONDANSETRON 4 MG: 2 INJECTION, SOLUTION INTRAMUSCULAR; INTRAVENOUS at 12:57

## 2017-01-18 RX ADMIN — HYDRALAZINE HYDROCHLORIDE 50 MG: 50 TABLET ORAL at 05:27

## 2017-01-18 RX ADMIN — LEVETIRACETAM 500 MG: 100 SOLUTION ORAL at 20:34

## 2017-01-18 RX ADMIN — Medication 1 TABLET: at 20:34

## 2017-01-18 RX ADMIN — MICONAZOLE NITRATE: 20 CREAM TOPICAL at 08:46

## 2017-01-18 RX ADMIN — LEVETIRACETAM 500 MG: 100 SOLUTION ORAL at 10:25

## 2017-01-18 RX ADMIN — PROMETHAZINE HYDROCHLORIDE 25 MG: 25 TABLET ORAL at 15:42

## 2017-01-18 RX ADMIN — METOPROLOL TARTRATE 50 MG: 50 TABLET, FILM COATED ORAL at 20:35

## 2017-01-18 RX ADMIN — HYDRALAZINE HYDROCHLORIDE 20 MG: 20 INJECTION INTRAMUSCULAR; INTRAVENOUS at 11:06

## 2017-01-18 RX ADMIN — DEXAMETHASONE SODIUM PHOSPHATE 4 MG: 4 INJECTION, SOLUTION INTRAMUSCULAR; INTRAVENOUS at 05:27

## 2017-01-18 RX ADMIN — SIMVASTATIN 10 MG: 20 TABLET, FILM COATED ORAL at 20:34

## 2017-01-18 RX ADMIN — INSULIN GLARGINE 20 UNITS: 100 INJECTION, SOLUTION SUBCUTANEOUS at 20:35

## 2017-01-18 RX ADMIN — METOPROLOL TARTRATE 50 MG: 50 TABLET, FILM COATED ORAL at 08:48

## 2017-01-18 RX ADMIN — LABETALOL HYDROCHLORIDE 10 MG: 5 INJECTION, SOLUTION INTRAVENOUS at 03:36

## 2017-01-18 RX ADMIN — HEPARIN SODIUM 2600 UNITS: 1000 INJECTION, SOLUTION INTRAVENOUS; SUBCUTANEOUS at 16:33

## 2017-01-18 RX ADMIN — DEXAMETHASONE SODIUM PHOSPHATE 4 MG: 4 INJECTION, SOLUTION INTRAMUSCULAR; INTRAVENOUS at 18:31

## 2017-01-18 RX ADMIN — DOCUSATE SODIUM 100 MG: 100 CAPSULE ORAL at 08:48

## 2017-01-18 RX ADMIN — SODIUM BICARBONATE TAB 650 MG 1300 MG: 650 TAB at 08:48

## 2017-01-18 RX ADMIN — MICONAZOLE NITRATE: 20 CREAM TOPICAL at 20:36

## 2017-01-18 RX ADMIN — AMLODIPINE BESYLATE 5 MG: 5 TABLET ORAL at 08:48

## 2017-01-18 RX ADMIN — LABETALOL HYDROCHLORIDE 10 MG: 5 INJECTION, SOLUTION INTRAVENOUS at 09:11

## 2017-01-18 RX ADMIN — DEXAMETHASONE SODIUM PHOSPHATE 4 MG: 4 INJECTION, SOLUTION INTRAMUSCULAR; INTRAVENOUS at 12:52

## 2017-01-18 RX ADMIN — HYDRALAZINE HYDROCHLORIDE 50 MG: 50 TABLET ORAL at 20:35

## 2017-01-18 ASSESSMENT — PAIN SCALES - GENERAL
PAINLEVEL_OUTOF10: 0

## 2017-01-18 ASSESSMENT — GAIT ASSESSMENTS: GAIT LEVEL OF ASSIST: UNABLE TO PARTICIPATE

## 2017-01-18 ASSESSMENT — ENCOUNTER SYMPTOMS
VOMITING: 1
NAUSEA: 1

## 2017-01-18 NOTE — PROGRESS NOTES
12 hr chart check    Day shift summary:    AOx3, dysarthric, perking up over course of shift. Follows on right. Hemovac DC'd. MRI brain, see report. PRN tylenol for headache.      PRN labetolol x 1 for HTN. VS otherwise stable.    Dialysis today, 2L off.    Suppository administered per protocol but still no BM

## 2017-01-18 NOTE — THERAPY
"Occupational Therapy Evaluation completed.   Functional Status: Pt seen today for OT eval. Pt with difficulty scanning left to right. Pt is maxA for bed mobility. MaxA x2 for sit<>stand. Pt's BUE AROM limited by weakness, LUE weaker than RUE. Pt appeared to have some tone present in LUE, however was difficult to assess due to impaired cognition. Pt confused throughout, disoriented. Pt able to follow commands ~75% of the time. Pt is modA for [place] grooming seated EOB, totalA for LB cares. Pt limited by weakness, fatigue, impaired balance, impaired cognition which impacts independence in ADLs and functional mobility.   Plan of Care: Will benefit from Occupational Therapy 5 times per week  Discharge Recommendations:  Equipment: Will Continue to Assess for Equipment Needs. Post-acute therapy recommended before discharged home.    See \"Rehab Therapy-Acute\" Patient Summary Report for complete documentation.    "

## 2017-01-18 NOTE — THERAPY
"Physical Therapy Evaluation completed.   Bed Mobility:  Supine to Sit: Maximal Assist  Transfers: Sit to Stand: Total Assist X 2  Gait: Level Of Assist: Unable to Participate with Front-Wheel Walker       Plan of Care: Will benefit from Physical Therapy 3 times per week  Discharge Recommendations: Equipment: Will Continue to Assess for Equipment Needs. Post-acute therapy recommended before discharged home.    Pt is a 72yo F s/p resection of intracranial mass on R side.  Pt presented with confusion and as a poor historian regarding PLOF and home enviornment.  Pt reported the month being May.  Pt required MaxA for bed mobility and Total assist x 2 for sit to stand.  Pt was unable to participate in formal MMT testing secondary to cognition, however Pt was able to move B LE's through full ROM against gravity. Currently Pt presents with cognitive deficits, delayed processing, decreased strenght, balance, and safety awareness limiting her funcitonal mobiltiy.  Pt will continue to benefit from skilled PT for optimal return of function and independence.    See \"Rehab Therapy-Acute\" Patient Summary Report for complete documentation.     "

## 2017-01-18 NOTE — PROGRESS NOTES
Progress Note               Author: Mango Schuler Date & Time created: 2017  8:33 AM     Interval History:  POD #3 crani and tumor resection  More awake and alert.   Following commands bilaterally.    Review of Systems:  Review of Systems   Gastrointestinal: Positive for nausea and vomiting.       Physical Exam:  Physical Exam   Neurological:   Pt globally weak, left > right. Improved.  More awake.   Speech clear.       Labs:  Recent Labs      01/15/17   0921   ISTATAPH  7.428   ISTATAPCO2  34.1   ISTATAPO2  103*   ISTATATCO2  24   [id number]  98   ISTATARTHCO3  22.5   ISTATARTBE  -1   ISTATTEMP  36.2 C   ISTATFIO2  0.5   ISTATSPEC  Arterial   ISTATAPHTC  7.440   [id number]  98*         Recent Labs      17   0037   SODIUM  135  134*  136   POTASSIUM  5.1  4.2  4.7   CHLORIDE  103  101  104   CO2  20  25  25   BUN  96*  56*  66*   CREATININE  5.57*  3.78*  4.08*   CALCIUM  7.9*  7.8*  7.6*     Recent Labs      17   0037   GLUCOSE  134*  89  171*     Recent Labs      17   0037   RBC  4.33  3.74*  3.54*   HEMOGLOBIN  13.2  11.9*  11.1*   HEMATOCRIT  39.1  33.7*  31.5*   PLATELETCT  140*  85*  89*   IRON   --    --   85   FERRITIN   --    --   378.8*   TOTIRONBC   --    --   146*     Recent Labs      17   0037   WBC  28.6*  20.2*  21.1*   NEUTSPOLYS  85.40*   --    --    LYMPHOCYTES  1.60*   --    --    MONOCYTES  11.30   --    --    EOSINOPHILS  0.00   --    --    BASOPHILS  0.20   --    --      Hemodynamics:  No data recorded.  Monitored Temp: 37.2 °C (99 °F)  Pulse  Av.7  Min: 52  Max: 120Heart Rate (Monitored): 85  NIBP: 160/73 mmHg     Respiratory:    Respiration: (!) 37, Pulse Oximetry: 98 %        RUL Breath Sounds: Clear, RML Breath Sounds: Diminished, RLL Breath Sounds: Diminished, SOURAV Breath Sounds: Clear, LLL Breath Sounds:  Diminished  Fluids:    Intake/Output Summary (Last 24 hours) at 01/16/17 0744  Last data filed at 01/16/17 0600   Gross per 24 hour   Intake    880 ml   Output    620 ml   Net    260 ml     Weight: 73.7 kg (162 lb 7.7 oz)  GI/Nutrition:  No orders of the defined types were placed in this encounter.     Medical Decision Making, by Problem:  Active Hospital Problems    Diagnosis   • Type 2 diabetes mellitus with stage 3 chronic kidney disease (CMS-HCC) [E11.22, N18.3]   • Intracranial mass [R90.0]   • Acute on chronic renal insufficiency (HCC) [N28.9, N18.9]   • HTN (hypertension) [I10]       Plan:  Continue medical care  Will await pathology  Keep in ICU today, q2hr neuro checks X 24 - 48 hours longer.     Labs reviewed, Radiology images reviewed and Medications reviewed

## 2017-01-18 NOTE — PROGRESS NOTES
[facility] Nephrology Progress Note               Author: DORYS Roberts MD, resident & Nikolay Pierce Date & Time created: 1/18/2017  1:47 PM     Interval History:  71 year old female with PMHx DM type 2, CKD who presented with headache and N/V and was found to have large right temporal intracranial mass on CT head ---> MRI brain reveals large right temporal lobe mass with extensive vasogenic edema most consistent with glioblastoma multiforme. There is R>L midline shift.    Consult to manage ANNABEL on CKD - patient has repeatedly said as outpatient - under no circumstances would she accept dialysis either temporarily or permanently.  HOWEVER - she has recanted that statement to me at this point.    DAILY NEPHROLOGY SUMMARY  1/08/17 - seen in consultation by Dr Chua.  ANNABEL felt likely due to pre-renal azotemia from poor intake w Nausea/headaches  1/09/17 - renal function not improved - no fluids given overnight - initiating x 1L  1/10/17 - Cr dropped from 5.17 to 5.09. K: 5.2. Nearly euvolemic. Better appetite. No indication to start HD now.    1/11/17 - MR done again on Khari 10. Nephro team double checked with Radiology office at 4492, the MR was done WITHOUT contrast. No HD today even her Cr is going up. No subjective uremic complaints. Near euvolemic status.    1/12/17 - HD catheter done at right IJ. No complication noted.    1/13/17 - Elevated Bun/Cr/K. No much subjective uremic symptoms. No symptoms from high K (5.7) 1st HD on Jan 13.   1/14/17 - s/p HD #1 yesterday without complication  Running again today in preparation for surgery tomorrow  1/15/17 - tolerated HD yesterday. NeuroSugery on Khari 15.   1/16/17 - Slow response to my interview. No much subjective complaints. Mild drowsiness noted. Bun/Cr from 1/15: 84/4.8 to 90/4.8. K at 5.3. No HD on 01/16.  1/17/17 - HD done smoothly in the morning for high Bun/Cr and low Na/high K. UF 2L. Low PLT at 85, trending down from 200K. No thrombosis or bleeding found.     1/18/17 - PLT stable at 89. Bun/Cr: 66/4. K4.7, Na 136. Will do another HD today to improve Na, reduce K/Bun/Cr.       Review of Systems   Limited ROS due to major brain surgery.   Constitutional: No fever  Cardiovascular: Denies chest pain or palpitations   Respiratory: Mild SOB, not worsening.   Gastrointestinal/Hepatic: N/V improved.   Genitourinary: Denies bladder dysfunction, dysuria or frequency  Musculoskeletal/Rheum: No complaints   Skin/Breast: Denies rash   Neurological: Left side weakness found yesterday improved.   All other systems were reviewed and are negative (AMA/CMS criteria)      Physical Exam     HEENT: s/p Surgery, stable wound.     Cardiovascular: S1 S2 noted.   Pulmonary/Chest: Effort normal and breath sounds normal. No respiratory distress.\  Abdominal: No tenderness, no rebounding pain  Musculoskeletal: Normal range of motion.   Neurological: Drowsiness improved. Left side muscle power improved. Working with our PT at bedside.    Psychiatric: Cant evaluate   Medication, lab, code status, vitals, images and orders were reviewed.         Labs:        Invalid input(s): [id number]      Recent Labs      01/17/17 1655 01/18/17   0037  01/18/17   0855   SODIUM  134*  136  139   POTASSIUM  4.2  4.7  4.7   CHLORIDE  101  104  105   CO2  25  25  27   BUN  56*  66*  74*   CREATININE  3.78*  4.08*  4.39*   CALCIUM  7.8*  7.6*  7.8*     Recent Labs      01/17/17   1655 01/18/17   0037  01/18/17   0855   GLUCOSE  89  171*  164*     Recent Labs      01/16/17 0413 01/17/17 0215 01/18/17   0037   RBC  4.33  3.74*  3.54*   HEMOGLOBIN  13.2  11.9*  11.1*   HEMATOCRIT  39.1  33.7*  31.5*   PLATELETCT  140*  85*  89*   IRON   --    --   85   FERRITIN   --    --   378.8*   TOTIRONBC   --    --   146*     Recent Labs      01/16/17 0413 01/17/17 0215 01/18/17 0037   WBC  28.6*  20.2*  21.1*   NEUTSPOLYS  85.40*   --    --    LYMPHOCYTES  1.60*   --    --    MONOCYTES  11.30   --    --     EOSINOPHILS  0.00   --    --    BASOPHILS  0.20   --    --            Hemodynamics:  No data recorded.  Monitored Temp: 37.2 °C (99 °F)  Pulse  Av.7  Min: 52  Max: 120Heart Rate (Monitored): 85  NIBP: 160/73 mmHg     Respiratory:    Respiration: (!) 37, Pulse Oximetry: 98 %        RUL Breath Sounds: Clear, RML Breath Sounds: Diminished, RLL Breath Sounds: Diminished, SOURAV Breath Sounds: Clear, LLL Breath Sounds: Diminished  Fluids:    Intake/Output Summary (Last 24 hours) at 17 1347  Last data filed at 17 0600   Gross per 24 hour   Intake   1405 ml   Output    250 ml   Net   1155 ml     Weight: 73.7 kg (162 lb 7.7 oz)  GI/Nutrition:  No orders of the defined types were placed in this encounter.     Medical Decision Making, by Problem:  Active Hospital Problems    Diagnosis       Assessment/Plan:  # NEW ESRD  -- iron Sat 38, ferrutin 235, not on ferrous. Hgb 11.    --  K: 1.7, HCO3: 25. On sodium bicarb. Corrected Ca: ~8.5, PO4:3.7, . Not on PO4 binder now.    -- s/p placement of dialysis catheter/Permcath on 17   -- s/p HD on , ,   -- HD TODAY () to improve Na, reduce K/Bun/Cr.   -- urine output 575 past 24 hours; and 2L UF done on .     # Intracranial Mass  --Neurosurgery on 1/15/17  --Oncology Dr. Salomon also following    # HTN, systolic around 150-170.   --sub-optimal control  --Continue home BP meds  --On amlodipine, hydralazine, metoprolol now.     # DM II--management per primary svc  # Metabolic Acidosis, with HCO3 25--continue PO bicarb supplements  # PLT stable at 80-90K. No thrombosis/bleeding found.

## 2017-01-18 NOTE — PROGRESS NOTES
Hospital Medicine Progress Note, Adult, Complex               Author: Earl Beltran Date & Time created: 1/18/2017  8:33 AM     Interval History:  Pt seen and evaluated in the ICU. Ms. Stephens has a hx of CKD and DM that presented to the ER on 1/6 with vomiting and a brain mass on CT with weight loss. Her kidney function worsened and she required dialysis. She underwent craniotomy by Dr. Ventura on 1/15. Her mentation is improving. Her nurse notes her BP has been elevated.   3% saline at 15 ml/hour. I met with family and we discussed her condition.   Review of Systems:  Review of Systems   Unable to perform ROS: acuity of condition       Physical Exam:  Physical Exam   Constitutional: No distress.   HENT:   Craniotomy with staples.   cortrak.   Neck: Neck supple.   Cardiovascular: Normal rate and regular rhythm.    Pulmonary/Chest: No respiratory distress. She has no wheezes.   Abdominal: Soft. She exhibits no distension. There is no tenderness.   Musculoskeletal: She exhibits edema. She exhibits no tenderness.   Neurological:   Moving both arms and legs. Sleepy, opens eyes, follows commands.    Skin: She is not diaphoretic. There is pallor.       Labs:  Recent Labs      01/15/17   0921   ISTATAPH  7.428   ISTATAPCO2  34.1   ISTATAPO2  103*   ISTATATCO2  24   [id number]  98   ISTATARTHCO3  22.5   ISTATARTBE  -1   ISTATTEMP  36.2 C   ISTATFIO2  0.5   ISTATSPEC  Arterial   ISTATAPHTC  7.440   [id number]  98*         Recent Labs      01/17/17   0215  01/17/17   1655  01/18/17   0037   SODIUM  135  134*  136   POTASSIUM  5.1  4.2  4.7   CHLORIDE  103  101  104   CO2  20  25  25   BUN  96*  56*  66*   CREATININE  5.57*  3.78*  4.08*   CALCIUM  7.9*  7.8*  7.6*     Recent Labs      01/17/17   0215  01/17/17   1655  01/18/17   0037   GLUCOSE  134*  89  171*     Recent Labs      01/16/17   0413  01/17/17   0215  01/18/17   0037   RBC  4.33  3.74*  3.54*   HEMOGLOBIN  13.2  11.9*  11.1*   HEMATOCRIT  39.1  33.7*  31.5*    PLATELETCT  140*  85*  89*   IRON   --    --   85   FERRITIN   --    --   378.8*   TOTIRONBC   --    --   146*     Recent Labs      17   0413  17   0215  17   0037   WBC  28.6*  20.2*  21.1*   NEUTSPOLYS  85.40*   --    --    LYMPHOCYTES  1.60*   --    --    MONOCYTES  11.30   --    --    EOSINOPHILS  0.00   --    --    BASOPHILS  0.20   --    --            Hemodynamics:  No data recorded.  Monitored Temp: 37.2 °C (99 °F)  Pulse  Av.7  Min: 52  Max: 120Heart Rate (Monitored): 85  NIBP: 160/73 mmHg     Respiratory:    Respiration: (!) 37, Pulse Oximetry: 98 %        RUL Breath Sounds: Clear, RML Breath Sounds: Diminished, RLL Breath Sounds: Diminished, SOURAV Breath Sounds: Clear, LLL Breath Sounds: Diminished  Fluids:    Intake/Output Summary (Last 24 hours) at 17 0832  Last data filed at 17 0600   Gross per 24 hour   Intake   2555 ml   Output   3315 ml   Net   -760 ml     Weight: 73.7 kg (162 lb 7.7 oz)  GI/Nutrition:  No orders of the defined types were placed in this encounter.     Medical Decision Making, by Problem:  Active Hospital Problems    Diagnosis   • Glioblastoma (CMS-HCC) [C71.9]s/p resection on 1/15, path + for glio. Follow daily Na. IV decadron. 3% saline drip at 15 ml/hour. Ba 139. Check BMP in the morning.Dr. Hernández, oncology, will consult as will radiation oncology.   • Type 2 diabetes mellitus with stage 3 chronic kidney disease (CMS-[facility]) [E11.22, N18.3]sliding scale insulin. Lantus 20.   • Acute on chronic renal failure: dialysis   • HTN (hypertension) [I10]on norvasc, hydralazine, metoprolol. Increase the dose of norvasc from 5 mg to 10 mg.   • Protein-calorie malnutrition, severe (CMS-HCC) [E43]from admit, clinical diagnosis in the setting of glioblastoma, weight loss, and low albumin.   • Thrombocytopenia (CMS-HCC) [D69.6]platelets dropped from 140  to 89 today.   • Dyslipidemia [E78.5]   I met with family at length and they are aware of her diagnosis  and the pending oncology and radiation oncology consults.     MRI:  1. Age-related cerebral atrophy.    2. Mild periventricular white matter changes consistent with chronic microvascular ischemic gliosis.    3. Large heterogeneous right temporal mass with surrounding vasogenic edema causing mild effacement of the right lateral ventricle and mild right left shift of the ventricular system.  Labs reviewed and Medications reviewed        DVT Prophylaxis: Contraindicated - High bleeding risk  DVT prophylaxis - mechanical: SCDs

## 2017-01-18 NOTE — CARE PLAN
Problem: Urinary Elimination:  Goal: Ability to reestablish a normal urinary elimination pattern will improve  Outcome: PROGRESSING SLOWER THAN EXPECTED  Pt has low urine output r/t dialysis today, still straw/yellow and present. Catheter in place. MD aware.     Problem: Skin Integrity  Goal: Risk for impaired skin integrity will decrease  Outcome: PROGRESSING AS EXPECTED  Pt repositioned every 2 hours, arms elevated off bed, heel float boots in use, silicone nasal cannula in use, MYLENE cream applied, and mepilex in place to prevent skin breakdown.

## 2017-01-19 PROBLEM — E87.1 HYPONATREMIA: Status: ACTIVE | Noted: 2017-01-19

## 2017-01-19 LAB
ANION GAP SERPL CALC-SCNC: 5 MMOL/L (ref 0–11.9)
ANION GAP SERPL CALC-SCNC: 8 MMOL/L (ref 0–11.9)
ANION GAP SERPL CALC-SCNC: 8 MMOL/L (ref 0–11.9)
ANION GAP SERPL CALC-SCNC: 9 MMOL/L (ref 0–11.9)
BUN SERPL-MCNC: 50 MG/DL (ref 8–22)
BUN SERPL-MCNC: 57 MG/DL (ref 8–22)
BUN SERPL-MCNC: 64 MG/DL (ref 8–22)
BUN SERPL-MCNC: 69 MG/DL (ref 8–22)
CALCIUM SERPL-MCNC: 7.3 MG/DL (ref 8.5–10.5)
CALCIUM SERPL-MCNC: 7.5 MG/DL (ref 8.5–10.5)
CALCIUM SERPL-MCNC: 7.6 MG/DL (ref 8.5–10.5)
CALCIUM SERPL-MCNC: 7.6 MG/DL (ref 8.5–10.5)
CHLORIDE SERPL-SCNC: 103 MMOL/L (ref 96–112)
CHLORIDE SERPL-SCNC: 104 MMOL/L (ref 96–112)
CHLORIDE SERPL-SCNC: 106 MMOL/L (ref 96–112)
CHLORIDE SERPL-SCNC: 107 MMOL/L (ref 96–112)
CO2 SERPL-SCNC: 24 MMOL/L (ref 20–33)
CO2 SERPL-SCNC: 26 MMOL/L (ref 20–33)
CO2 SERPL-SCNC: 26 MMOL/L (ref 20–33)
CO2 SERPL-SCNC: 28 MMOL/L (ref 20–33)
CREAT SERPL-MCNC: 3.09 MG/DL (ref 0.5–1.4)
CREAT SERPL-MCNC: 3.32 MG/DL (ref 0.5–1.4)
CREAT SERPL-MCNC: 3.57 MG/DL (ref 0.5–1.4)
CREAT SERPL-MCNC: 3.59 MG/DL (ref 0.5–1.4)
ERYTHROCYTE [DISTWIDTH] IN BLOOD BY AUTOMATED COUNT: 47.8 FL (ref 35.9–50)
GFR SERPL CREATININE-BSD FRML MDRD: 12 ML/MIN/1.73 M 2
GFR SERPL CREATININE-BSD FRML MDRD: 12 ML/MIN/1.73 M 2
GFR SERPL CREATININE-BSD FRML MDRD: 14 ML/MIN/1.73 M 2
GFR SERPL CREATININE-BSD FRML MDRD: 15 ML/MIN/1.73 M 2
GLUCOSE BLD-MCNC: 174 MG/DL (ref 65–99)
GLUCOSE BLD-MCNC: 187 MG/DL (ref 65–99)
GLUCOSE BLD-MCNC: 213 MG/DL (ref 65–99)
GLUCOSE BLD-MCNC: 215 MG/DL (ref 65–99)
GLUCOSE BLD-MCNC: 219 MG/DL (ref 65–99)
GLUCOSE SERPL-MCNC: 185 MG/DL (ref 65–99)
GLUCOSE SERPL-MCNC: 213 MG/DL (ref 65–99)
GLUCOSE SERPL-MCNC: 229 MG/DL (ref 65–99)
GLUCOSE SERPL-MCNC: 261 MG/DL (ref 65–99)
HCT VFR BLD AUTO: 30.4 % (ref 37–47)
HGB BLD-MCNC: 10.5 G/DL (ref 12–16)
IRON SATN MFR SERPL: 33 % (ref 15–55)
IRON SERPL-MCNC: 46 UG/DL (ref 40–170)
MCH RBC QN AUTO: 31.2 PG (ref 27–33)
MCHC RBC AUTO-ENTMCNC: 34.5 G/DL (ref 33.6–35)
MCV RBC AUTO: 90.2 FL (ref 81.4–97.8)
PLATELET # BLD AUTO: 85 K/UL (ref 164–446)
PMV BLD AUTO: 12.5 FL (ref 9–12.9)
POTASSIUM SERPL-SCNC: 4.5 MMOL/L (ref 3.6–5.5)
POTASSIUM SERPL-SCNC: 4.7 MMOL/L (ref 3.6–5.5)
POTASSIUM SERPL-SCNC: 4.9 MMOL/L (ref 3.6–5.5)
POTASSIUM SERPL-SCNC: 5 MMOL/L (ref 3.6–5.5)
RBC # BLD AUTO: 3.37 M/UL (ref 4.2–5.4)
SODIUM SERPL-SCNC: 136 MMOL/L (ref 135–145)
SODIUM SERPL-SCNC: 138 MMOL/L (ref 135–145)
SODIUM SERPL-SCNC: 140 MMOL/L (ref 135–145)
SODIUM SERPL-SCNC: 140 MMOL/L (ref 135–145)
TIBC SERPL-MCNC: 141 UG/DL (ref 250–450)
WBC # BLD AUTO: 16.7 K/UL (ref 4.8–10.8)

## 2017-01-19 PROCEDURE — A9270 NON-COVERED ITEM OR SERVICE: HCPCS | Performed by: HOSPITALIST

## 2017-01-19 PROCEDURE — 82962 GLUCOSE BLOOD TEST: CPT

## 2017-01-19 PROCEDURE — 83540 ASSAY OF IRON: CPT

## 2017-01-19 PROCEDURE — A9270 NON-COVERED ITEM OR SERVICE: HCPCS | Performed by: INTERNAL MEDICINE

## 2017-01-19 PROCEDURE — 700102 HCHG RX REV CODE 250 W/ 637 OVERRIDE(OP): Performed by: HOSPITALIST

## 2017-01-19 PROCEDURE — 83550 IRON BINDING TEST: CPT

## 2017-01-19 PROCEDURE — 85027 COMPLETE CBC AUTOMATED: CPT

## 2017-01-19 PROCEDURE — 700102 HCHG RX REV CODE 250 W/ 637 OVERRIDE(OP)

## 2017-01-19 PROCEDURE — 700102 HCHG RX REV CODE 250 W/ 637 OVERRIDE(OP): Performed by: PHARMACIST

## 2017-01-19 PROCEDURE — 36415 COLL VENOUS BLD VENIPUNCTURE: CPT

## 2017-01-19 PROCEDURE — 99233 SBSQ HOSP IP/OBS HIGH 50: CPT | Performed by: INTERNAL MEDICINE

## 2017-01-19 PROCEDURE — 700102 HCHG RX REV CODE 250 W/ 637 OVERRIDE(OP): Performed by: PHYSICIAN ASSISTANT

## 2017-01-19 PROCEDURE — A9270 NON-COVERED ITEM OR SERVICE: HCPCS | Performed by: PHYSICIAN ASSISTANT

## 2017-01-19 PROCEDURE — A9270 NON-COVERED ITEM OR SERVICE: HCPCS

## 2017-01-19 PROCEDURE — 700111 HCHG RX REV CODE 636 W/ 250 OVERRIDE (IP): Performed by: HOSPITALIST

## 2017-01-19 PROCEDURE — 700105 HCHG RX REV CODE 258: Performed by: HOSPITALIST

## 2017-01-19 PROCEDURE — 80048 BASIC METABOLIC PNL TOTAL CA: CPT

## 2017-01-19 PROCEDURE — A9270 NON-COVERED ITEM OR SERVICE: HCPCS | Performed by: PHARMACIST

## 2017-01-19 PROCEDURE — 700102 HCHG RX REV CODE 250 W/ 637 OVERRIDE(OP): Performed by: INTERNAL MEDICINE

## 2017-01-19 PROCEDURE — 770009 HCHG ROOM/CARE - ONCOLOGY SEMI PRI*

## 2017-01-19 RX ORDER — 3% SODIUM CHLORIDE 3 G/100ML
INJECTION, SOLUTION INTRAVENOUS CONTINUOUS
Status: DISCONTINUED | OUTPATIENT
Start: 2017-01-19 | End: 2017-01-19

## 2017-01-19 RX ADMIN — DEXAMETHASONE SODIUM PHOSPHATE 4 MG: 4 INJECTION, SOLUTION INTRAMUSCULAR; INTRAVENOUS at 17:05

## 2017-01-19 RX ADMIN — SODIUM CHLORIDE: 3 INJECTION, SOLUTION INTRAVENOUS at 05:26

## 2017-01-19 RX ADMIN — Medication 1 TABLET: at 19:42

## 2017-01-19 RX ADMIN — INSULIN GLARGINE 20 UNITS: 100 INJECTION, SOLUTION SUBCUTANEOUS at 19:54

## 2017-01-19 RX ADMIN — ONDANSETRON 4 MG: 2 INJECTION, SOLUTION INTRAMUSCULAR; INTRAVENOUS at 17:05

## 2017-01-19 RX ADMIN — AMLODIPINE BESYLATE 10 MG: 10 TABLET ORAL at 09:33

## 2017-01-19 RX ADMIN — MICONAZOLE NITRATE: 20 CREAM TOPICAL at 09:33

## 2017-01-19 RX ADMIN — METOPROLOL TARTRATE 50 MG: 50 TABLET, FILM COATED ORAL at 19:40

## 2017-01-19 RX ADMIN — HYDRALAZINE HYDROCHLORIDE 50 MG: 50 TABLET ORAL at 05:06

## 2017-01-19 RX ADMIN — HYDRALAZINE HYDROCHLORIDE 50 MG: 50 TABLET ORAL at 22:41

## 2017-01-19 RX ADMIN — DEXAMETHASONE SODIUM PHOSPHATE 4 MG: 4 INJECTION, SOLUTION INTRAMUSCULAR; INTRAVENOUS at 05:06

## 2017-01-19 RX ADMIN — SODIUM BICARBONATE TAB 650 MG 1300 MG: 650 TAB at 09:33

## 2017-01-19 RX ADMIN — LEVETIRACETAM 500 MG: 100 SOLUTION ORAL at 19:42

## 2017-01-19 RX ADMIN — SIMVASTATIN 10 MG: 20 TABLET, FILM COATED ORAL at 19:40

## 2017-01-19 RX ADMIN — DEXAMETHASONE SODIUM PHOSPHATE 4 MG: 4 INJECTION, SOLUTION INTRAMUSCULAR; INTRAVENOUS at 23:41

## 2017-01-19 RX ADMIN — ONDANSETRON 4 MG: 2 INJECTION, SOLUTION INTRAMUSCULAR; INTRAVENOUS at 09:50

## 2017-01-19 RX ADMIN — SCOPALAMINE 1 PATCH: 1 PATCH, EXTENDED RELEASE TRANSDERMAL at 11:00

## 2017-01-19 RX ADMIN — DEXAMETHASONE SODIUM PHOSPHATE 4 MG: 4 INJECTION, SOLUTION INTRAMUSCULAR; INTRAVENOUS at 11:56

## 2017-01-19 RX ADMIN — LEVETIRACETAM 500 MG: 100 SOLUTION ORAL at 09:33

## 2017-01-19 RX ADMIN — METOPROLOL TARTRATE 50 MG: 50 TABLET, FILM COATED ORAL at 09:33

## 2017-01-19 RX ADMIN — ONDANSETRON 4 MG: 2 INJECTION, SOLUTION INTRAMUSCULAR; INTRAVENOUS at 01:45

## 2017-01-19 RX ADMIN — HYDRALAZINE HYDROCHLORIDE 50 MG: 50 TABLET ORAL at 13:52

## 2017-01-19 RX ADMIN — MICONAZOLE NITRATE: 20 CREAM TOPICAL at 19:51

## 2017-01-19 ASSESSMENT — ENCOUNTER SYMPTOMS
CHILLS: 0
BACK PAIN: 0
EYE PAIN: 0
SHORTNESS OF BREATH: 0
MYALGIAS: 1
EYE DISCHARGE: 0
ABDOMINAL PAIN: 0
HEADACHES: 0
WEAKNESS: 0
COUGH: 0
FEVER: 0
NERVOUS/ANXIOUS: 0
DEPRESSION: 0
HEADACHES: 1
BLURRED VISION: 0
CLAUDICATION: 0
INSOMNIA: 0
NECK PAIN: 0
HEARTBURN: 0
NAUSEA: 1
DIARRHEA: 0
FOCAL WEAKNESS: 1
DIZZINESS: 0
SEIZURES: 0

## 2017-01-19 ASSESSMENT — PAIN SCALES - GENERAL: PAINLEVEL_OUTOF10: 0

## 2017-01-19 NOTE — PROGRESS NOTES
Met with patient and family at bedside.  Pt sleeping during entire time and did not open eyes or respond.  Family reporting it was ok to allow pt to rest at this time.  Provided education to family regarding cancer nurse navigator's role once patient is discharged.  Able to answer some of their questions.  Main concern was knowing how long patient was going to be in the hospital.  Encouraged them to discuss with hospitalist and neurosurgery.  Another concern was regarding the transfer out of ICU to neuro unity then to oncology unit without much discussion with patient regarding her diagnosis.  Answered additional questions as best as possible.   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  All were grateful for navigator stopping by.  Contact information provided.  Will stop by tomorrow in am when patient more alert.  They reported her being better today and more alert than previously.

## 2017-01-19 NOTE — CARE PLAN
Problem: Pain Management  Goal: Pain level will decrease to patient’s comfort goal  Outcome: PROGRESSING AS EXPECTED  Monitor pain levels, medicate prn per mar, offer nonpharm pain interventions    Problem: Safety  Goal: Will remain free from injury  Outcome: PROGRESSING AS EXPECTED  Hourly rounding, call light within reach, nonslip footwear on, bed locked/ placed in lowest position, family present at bedside, side rails upx2    Problem: Skin Integrity  Goal: Risk for impaired skin integrity will decrease  Outcome: PROGRESSING AS EXPECTED  Assess and monitor skin integrity, turn q2hrs

## 2017-01-19 NOTE — PROGRESS NOTES
Oncology/Hematology Progress Note               Author: HAVEN Hernández Date & Time created: 1/18/2017  5:52 PM     Interval History:  CC: Glioblastoma          Thrombocytopenia           Renal failure; dialysis  Dr. Salomon did original consult on 1/8/17  Has been getting dialysis  Tumor=Glioblastoma   states she has seen Dr. Martinez    Review of Systems:  Review of Systems   Unable to perform ROS      Physical Exam:  Physical Exam   Constitutional: No distress.   HENT:   Mouth/Throat: No oropharyngeal exudate.   Neck:   R neck dialysis cath   Cardiovascular: Normal rate and normal heart sounds.    Pulmonary/Chest: No respiratory distress.   Abdominal: Soft. She exhibits no distension. There is no tenderness.   Neurological:   Left sided weakness ; can barely squeeze hand   Skin: Skin is warm.       Labs:        Invalid input(s): [id number]      Recent Labs      01/17/17 1655 01/18/17 0037 01/18/17   0855   SODIUM  134*  136  139   POTASSIUM  4.2  4.7  4.7   CHLORIDE  101  104  105   CO2  25  25  27   BUN  56*  66*  74*   CREATININE  3.78*  4.08*  4.39*   CALCIUM  7.8*  7.6*  7.8*     Recent Labs      01/17/17 1655 01/18/17 0037  01/18/17   0855   GLUCOSE  89  171*  164*     Recent Labs      01/16/17 0413 01/17/17 0215 01/18/17   0037   RBC  4.33  3.74*  3.54*   HEMOGLOBIN  13.2  11.9*  11.1*   HEMATOCRIT  39.1  33.7*  31.5*   PLATELETCT  140*  85*  89*   IRON   --    --   85   FERRITIN   --    --   378.8*   TOTIRONBC   --    --   146*     Recent Labs      01/16/17 0413 01/17/17 0215 01/18/17   0037   WBC  28.6*  20.2*  21.1*   NEUTSPOLYS  85.40*   --    --    LYMPHOCYTES  1.60*   --    --    MONOCYTES  11.30   --    --    EOSINOPHILS  0.00   --    --    BASOPHILS  0.20   --    --      Recent Labs      01/17/17 1655 01/18/17 0037  01/18/17   0855   SODIUM  134*  136  139   POTASSIUM  4.2  4.7  4.7   CHLORIDE  101  104  105   CO2  25  25  27   GLUCOSE  89  171*  164*   BUN  56*   66*  74*   CREATININE  3.78*  4.08*  4.39*   CALCIUM  7.8*  7.6*  7.8*     Hemodynamics:  No data recorded.  Monitored Temp: 37.3 °C (99.1 °F)  Pulse  Av.5  Min: 52  Max: 120Heart Rate (Monitored): 87  NIBP: 126/60 mmHg     Respiratory:    Respiration: (!) 23, Pulse Oximetry: 96 %        RUL Breath Sounds: Diminished, RML Breath Sounds: Diminished, RLL Breath Sounds: Diminished, SOURAV Breath Sounds: Diminished, LLL Breath Sounds: Diminished  Fluids:    Intake/Output Summary (Last 24 hours) at 17 1752  Last data filed at 17 1648   Gross per 24 hour   Intake   2740 ml   Output   3370 ml   Net   -630 ml     Weight: 73.7 kg (162 lb 7.7 oz)  GI/Nutrition:  No orders of the defined types were placed in this encounter.     Medical Decision Making, by Problem:  Active Hospital Problems    Diagnosis   • Glioblastoma (CMS-HCC) [C71.9]   • Type 2 diabetes mellitus with stage 3 chronic kidney disease (CMS-HCC) [E11.22, N18.3]   • HTN (hypertension) [I10]   • Protein-calorie malnutrition, severe (CMS-HCC) [E43]   • Thrombocytopenia (CMS-HCC) [D69.6]   • Renal failure, acute on chronic (CMS-HCC) [N17.9, N18.9]   • Dyslipidemia [E78.5]       Plan:  REmains to be seen whether patient can receive Temodar with renal failure  XRT iin future  Hopefully plts will continue to rise; receiving heparin with dialysis  Will check back in a few days    Core Measures

## 2017-01-19 NOTE — PROGRESS NOTES
[facility] Nephrology Progress Note               Author: DORYS Roberts MD, resident & Nikolay Pierce Date & Time created: 1/19/2017  1:42 PM     Interval History:  71 year old female with PMHx DM type 2, CKD who presented with headache and N/V and was found to have large right temporal intracranial mass on CT head ---> MRI brain reveals large right temporal lobe mass with extensive vasogenic edema most consistent with glioblastoma multiforme. There is R>L midline shift.    Consult to manage ANNABEL on CKD - patient has repeatedly said as outpatient - under no circumstances would she accept dialysis either temporarily or permanently.  HOWEVER - she has recanted that statement to me at this point.    DAILY NEPHROLOGY SUMMARY  1/08/17 - seen in consultation by Dr Chua.  ANNABEL felt likely due to pre-renal azotemia from poor intake w Nausea/headaches  1/09/17 - renal function not improved - no fluids given overnight - initiating x 1L  1/10/17 - Cr dropped from 5.17 to 5.09. K: 5.2. Nearly euvolemic. Better appetite. No indication to start HD now.    1/11/17 - MR done again on Khari 10. Nephro team double checked with Radiology office at [id number], the MR was done WITHOUT contrast. No HD today even her Cr is going up. No subjective uremic complaints. Near euvolemic status.    1/12/17 - HD catheter done at right IJ. No complication noted.    1/13/17 - Elevated Bun/Cr/K. No much subjective uremic symptoms. No symptoms from high K (5.7) 1st HD on Jan 13.   1/14/17 - s/p HD #1 yesterday without complication  Running again today in preparation for surgery tomorrow  1/15/17 - tolerated HD yesterday. NeuroSugery on Khari 15.   1/16/17 - Slow response to my interview. No much subjective complaints. Mild drowsiness noted. Bun/Cr from 1/15: 84/4.8 to 90/4.8. K at 5.3. No HD on 01/16.  1/17/17 - HD done smoothly in the morning for high Bun/Cr and low Na/high K. UF 2L. Low PLT at 85, trending down from 200K. No thrombosis or bleeding found.     1/18/17 - PLT stable at 89. Bun/Cr: 66/4. K4.7, Na 136. Will do another HD today to improve Na, reduce K/Bun/Cr.   1/19/17 -PLT stable at 85. Bun/Cr 57/3.3. Better consciousness level. Better left side muscle power. Will arrange Gao for her.       Review of Systems   Limited ROS due to major brain surgery.   Constitutional: No fever  Cardiovascular: Denies chest pain or palpitations   Respiratory: Mild SOB, not worsening.   Gastrointestinal/Hepatic: N/V improved.   Genitourinary: Denies bladder dysfunction, dysuria or frequency  Musculoskeletal/Rheum: No complaints   Skin/Breast: Denies rash   Neurological: Left side weakness found yesterday improved.   All other systems were reviewed and are negative (AMA/CMS criteria)      Physical Exam     HEENT: s/p Surgery, stable wound.     Cardiovascular: S1 S2 noted.   Pulmonary/Chest: Effort normal and breath sounds normal. No respiratory distress.\  Abdominal: No tenderness, no rebounding pain  Musculoskeletal: Normal range of motion.   Neurological: Drowsiness improved. Left side muscle power improved. Working with our PT at bedside.    Psychiatric: Cant evaluate   Medication, lab, code status, vitals, images and orders were reviewed.         Labs:        Invalid input(s): [id number]      Recent Labs      01/18/17 2349 01/19/17   0515  01/19/17   1250   SODIUM  138  136  140   POTASSIUM  4.7  4.5  4.9   CHLORIDE  104  103  107   CO2  26  28  24   BUN  50*  57*  64*   CREATININE  3.09*  3.32*  3.57*   CALCIUM  7.5*  7.6*  7.3*     Recent Labs      01/18/17   2349 01/19/17   0515  01/19/17   1250   GLUCOSE  213*  185*  229*     Recent Labs      01/17/17 0215 01/18/17   0037  01/19/17   0515   RBC  3.74*  3.54*  3.37*   HEMOGLOBIN  11.9*  11.1*  10.5*   HEMATOCRIT  33.7*  31.5*  30.4*   PLATELETCT  85*  89*  85*   IRON   --   85  46   FERRITIN   --   378.8*   --    TOTIRONBC   --   146*  141*     Recent Labs      01/17/17   0215 01/18/17   0037  01/19/17    0515   WBC  20.2*  21.1*  16.7*           Hemodynamics:  Temp (24hrs), Av.5 °C (97.7 °F), Min:36.1 °C (97 °F), Max:36.8 °C (98.3 °F)  Temperature: 36.6 °C (97.8 °F), Monitored Temp: 37.8 °C (100 °F)  Pulse  Av.2  Min: 52  Max: 120Heart Rate (Monitored): 83  Blood Pressure : 141/50 mmHg, NIBP: 145/64 mmHg     Respiratory:    Respiration: 16, Pulse Oximetry: 93 %     Work Of Breathing / Effort: Shallow;Mild  RUL Breath Sounds: Diminished, RML Breath Sounds: Diminished, RLL Breath Sounds: Diminished, SOURAV Breath Sounds: Diminished, LLL Breath Sounds: Diminished  Fluids:    Intake/Output Summary (Last 24 hours) at 17 1342  Last data filed at 17 1200   Gross per 24 hour   Intake   1830 ml   Output   3095 ml   Net  -1265 ml     Weight: 74.1 kg (163 lb 5.8 oz)  GI/Nutrition:  No orders of the defined types were placed in this encounter.     Medical Decision Making, by Problem:  Active Hospital Problems    Diagnosis       Assessment/Plan:  # NEW ESRD  -- iron Sat 38, ferrutin 235, not on ferrous. Hgb 11.    --  K: 1.7, HCO3: 25. On sodium bicarb. Corrected Ca: ~8.5, PO4:3.7, . Not on PO4 binder now.    -- s/p placement of dialysis catheter on 17   -- s/p HD on , , ,   -- urine output 200 past 24 hours; and 2L UF done on .     # Intracranial Mass  --Neurosurgery on 1/15/17  --Oncology Dr. Salomon also following    # HTN, systolic around 150.   --sub-optimal control  --Continue home BP meds  --On amlodipine, hydralazine, metoprolol now.     # DM II--management per primary svc  # Metabolic Acidosis, with HCO3 28--continue PO bicarb supplements  # PLT stable at 80-90K. No thrombosis/bleeding found.

## 2017-01-19 NOTE — CARE PLAN
Problem: Knowledge Deficit  Goal: Knowledge of disease process/condition, treatment plan, diagnostic tests, and medications will improve  Intervention: Assess knowledge level of disease process/condition, treatment plan, diagnostic tests, and medications  Reviewing plan of care, activities, and medication with patient.  Encouraging patient to ask questions and participate in plan of care.  Providing answers to all questions.  Continuing with current plan of care.  Hourly rounding in practice.      Problem: Fluid Volume:  Goal: Will maintain balanced intake and output  Intervention: Monitor, educate, and encourage compliance with therapeutic intake of liquids  Monitoring pt fluid intake.  Providing tube feed and IV fluids per MD order, monitoring output.  Continuing with plan of care.

## 2017-01-19 NOTE — PROGRESS NOTES
Hd treatment ordered by Dr Pierce started at 1330 and ended at 1633 with net uf of 2000 ml as bp tolerated. Not in distress post treatment. See flow sheet for details.

## 2017-01-19 NOTE — PROGRESS NOTES
Pt arrived to room R319 at this time via bed with hospital transport. Family present at bedside. Pt a/o x3, no acute distress noted, respirations even and unlabored. Pt denies pain at this time. coretrak in place, restarted feeding. Side rails up x2, call light within reach, heel protector boots on pt, bed locked/ placed in lowest position. Will continue to monitor.

## 2017-01-19 NOTE — PROGRESS NOTES
Progress Note               Author: Ramesh Prince Date & Time created: 2017  8:26 AM     Interval History:  POD #4 crani and tumor resection  Path is GBM    Pt still confused, but more alert and talking  Weak to left arm, minimal movement to left leg; right side grossly strong  Alert to person only  Seen by oncology     Review of Systems:  Review of Systems   Gastrointestinal: Positive for nausea.   Neurological: Positive for headaches.       Physical Exam:  Physical Exam   Neurological:   Alert and oriented to person only        Labs:        Invalid input(s): [id number]      Recent Labs      17   0855  17   2349  17   0515   SODIUM  139  138  136   POTASSIUM  4.7  4.7  4.5   CHLORIDE  105  104  103   CO2  27  26  28   BUN  74*  50*  57*   CREATININE  4.39*  3.09*  3.32*   CALCIUM  7.8*  7.5*  7.6*     Recent Labs      17   0855  17   2349  17   0515   GLUCOSE  164*  213*  185*     Recent Labs      17   0215  17   0037  17   0515   RBC  3.74*  3.54*  3.37*   HEMOGLOBIN  11.9*  11.1*  10.5*   HEMATOCRIT  33.7*  31.5*  30.4*   PLATELETCT  85*  89*  85*   IRON   --   85  46   FERRITIN   --   378.8*   --    TOTIRONBC   --   146*  141*     Recent Labs      17   0215  17   0037  17   0515   WBC  20.2*  21.1*  16.7*     Hemodynamics:  Temp (24hrs), Av.5 °C (97.7 °F), Min:36.1 °C (97 °F), Max:36.8 °C (98.3 °F)  Temperature: 36.1 °C (97 °F), Monitored Temp: 37.8 °C (100 °F)  Pulse  Av.2  Min: 52  Max: 120Heart Rate (Monitored): 83  Blood Pressure : 150/45 mmHg, NIBP: 145/64 mmHg     Respiratory:    Respiration: 16, Pulse Oximetry: 93 %     Work Of Breathing / Effort: Shallow;Mild  RUL Breath Sounds: Diminished, RML Breath Sounds: Diminished, RLL Breath Sounds: Diminished, SOURAV Breath Sounds: Diminished, LLL Breath Sounds: Diminished  Fluids:    Intake/Output Summary (Last 24 hours) at 17 0852  Last data filed at 17 0201    Gross per 24 hour   Intake   2030 ml   Output   3195 ml   Net  -1165 ml     Weight: 74.1 kg (163 lb 5.8 oz)  GI/Nutrition:  No orders of the defined types were placed in this encounter.     Medical Decision Making, by Problem:  Active Hospital Problems    Diagnosis   • Glioblastoma (CMS-HCC) [C71.9]   • Type 2 diabetes mellitus with stage 3 chronic kidney disease (CMS-HCC) [E11.22, N18.3]   • HTN (hypertension) [I10]   • Protein-calorie malnutrition, severe (CMS-HCC) [E43]   • Thrombocytopenia (CMS-HCC) [D69.6]   • Renal failure, acute on chronic (CMS-HCC) [N17.9, N18.9]   • Dyslipidemia [E78.5]       Plan:  Continue medical care / oncology  Tsf to oncology   Continue steroids for now  Will watch     Labs reviewed, Radiology images reviewed and Medications reviewed

## 2017-01-19 NOTE — CONSULTS
DATE OF SERVICE:  01/18/2017    DIAGNOSIS:  Glioblastoma multiforme of the right temporal lobe, status post   gross total resection on 01/15/2017.    HISTORY OF PRESENT ILLNESS:  I had the pleasure of seeing the patient today in   consultation at the request of Dr. Earl Beltran for her newly diagnosed   glioblastoma multiforme.  The patient is a 71-year-old woman who presented to   [facility] with nausea and vomiting.  In retrospect, she feels she   has had this since nearly November, but it has been intermittent in nature.    She did have some episodes of confusion and personality change as well during   that time period.  During the visit to [facility], CT scan without   contrast given her chronic kidney disease was done that showed a right   temporal mass with associated edema.  She was transferred to [facility]   and MRI without contrast was done that showed a hemorrhagic right temporal   lobe mass concerning for primary brain malignancy.  She underwent a stealth   craniotomy resection on 01/15/2017 by Dr. Ventura that confirmed a   glioblastoma multiforme.  A gross total resection was achieved and patient is   now postoperative day #3 and recovering well.  She still has left-sided   weakness and some confusion, but is improving steadily.  I have been asked to   see her for consideration of adjuvant radiotherapy for glioblastoma   multiforme.  Dr. Salomon even talked with the patient preoperatively about the   likelihood of radiation and Temodar after surgery as this looked like an   aggressive primary brain tumor.    PAST MEDICAL HISTORY:  Significant for chronic kidney disease stage III/IV,   vitamin B12 deficiency, hypertension, diabetes mellitus.    MEDICATIONS:  Include Decadron, Keppra, diltiazem, Colace, insulin, Toprol,   Actos, Zocor.    ALLERGIES:  CODEINE, DILAUDID, PERCOCET, ULTRAM, VICODIN.    FAMILY HISTORY:  Significant for a brother who had multiple myeloma.    SOCIAL  HISTORY:  Patient's  and niece are with her at bedside.  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    REVIEW OF SYSTEMS:  A complete 18-point review of systems is in patient's   electronic medical record dated 01/18/2017.  All are negative with   relationship to this diagnosis with the exception of an appropriate   postoperative course from craniotomy.  She has left-sided weakness and some   confusion.  Family has not fully described her diagnosis to her, but she was   aware of the likelihood of glioblastoma multiforme even preoperatively.    PHYSICAL EXAMINATION:  VITAL SIGNS:  Patient's vital signs are stable.  GENERAL:  She is currently actively getting dialysis while we were talking.    Performance status currently ECOG 2, but appropriate for postoperative course   and improving.  EYES:  Eye exam reveals equally reactive pupils.  HEENT:  Reveals no lesions in the oral cavity or oropharynx.  NECK:  Reveals no cervical or supraclavicular lymphadenopathy.  LUNGS:  Clear to auscultation bilaterally.  HEART:  Normal S1, S2.  ABDOMEN:  Soft and nontender.  MUSCULOSKELETAL:  Reveals no acute areas of bony tenderness or deformity.  NEUROLOGIC:  Patient is alert and oriented, but not appropriate enough level   to have full discussion.  We will re-describe treatment when she comes for   simulation.     Assessment: GBM of Right Temporal Lobe status post gross total rescection    Plan:  Discussed diagnosis,prognosis and treatment options over a roughly 75 minute time peroid, 95% of that time dedicated to treatment and prognosis,   We did have a more extended discussion with her  and   niece.  We discussed the role of radiation in combination with Temodar   followed by adjuvant Temodar.  We discussed in patients over 70, a   hypofractionated course of only 3 weeks' time to a total dose of 4005 cGy.    This would start with a simulation between postoperative week 3 and 4.  We   have given them  an appointment card for 02/06/2017 at 1:00 p.m.  We will not   utilize any contrast during that visit due to her chronic kidney disease.  We   discussed all the risks, benefits, and side effects of therapy and patient's   family and she are amenable to treatment.       ____________________________________     Devin Martinez MD    [facility] / [facility]    DD:  01/18/2017 14:56:05  DT:  01/18/2017 19:11:54    D#:  [id number]  Job#:  [id number]    cc: JV FARRIS MD, Bry Ventura MD, NGUYEN BOWMAN MD

## 2017-01-19 NOTE — PROGRESS NOTES
Pt arrived to unit, transferred to hospital bed via slide board, pt slight drowsy, A&Ox4, PERRL, no complaints of pain/nausea, respirations are shallow, even, and unlabored with dimished breath sounds on room air, abdomen is soft, nontender with positive bowel sounds. Incision approximated, no s/s of infection. RN restarted Diabetasource TF at 50-goal, will monitor for nausea. SCDs on, float heal boots on, bed alarm on, call light within reach, family at bedside for support, will continue to monitor.

## 2017-01-19 NOTE — WOUND TEAM
"[facility]  Inpatient Services  Initial Wound & Skin Care Evaluation    Admission Date:  1/7/2017   HPI, PMH, SH: Reviewed  Unit where seen by Wound Team: S104/00    WOUND CONSULT RELATED TO: sacrum, perineum, rectum    SUBJECTIVE:  \"Okay.\"    Self Report / Pain Level: no c/o pain      OBJECTIVE: on DANDY bed, heels floated on pillows  WOUND TYPE, LOCATION, CHARACTERISTICS (Pressure ulcers: location, stage, POA or date identified)  Wound Other (IAD with edema and maceration)   Perineum;Labia;Rectum-Periwound Skin: Pink, Normal  Drainage : None      Tissue Type and %:    100% red/pink  Wound Edges:    irregular    Odor:     None   Exposed structure(s):   No   Signs and Symptoms of Infection:satellite lesions    Measurements:   Length (cm): 15  Width (cm): 9  Depth (cm):  (<0.1)     Tracts/undermining:   None     INTERVENTIONS BY WOUND TEAM: pt turned to R side, skin viewed, reapplied antifungal Hector  Dressing Options: Open to Air      Interdisciplinary consultation:   With Nursing;With Patient     EVALUATION: pt has IAD with satellite lesions    Factors affecting wound healing: incontinence, edema, DM, HTN, chronic kidney disease, decreased mobility  Goals: resolution of IAD in 2-3 weeks      NURSING PLAN OF CARE ORDERS (X):    Dressing changes: See Dressing Maintenance orders:   Skin care: See Skin Care orders: x  Rectal tube care: See Rectal Tube Care orders:   Other orders:    RSKIN: CURRENT (X) ORDERED (O)  Q shift Eulalio:  X  Q shift pressure point assessments:  X  Pressure redistribution mattress        DANDY  x    Bariatric DANDY      Bariatric foam        Heel float boots       Heels floated on pillows    x  Barrier wipes  x    Barrier Cream      Barrier paste      Sacral silicone dressing      Padded O2 tubing  x    Anchorfast      Trach with Optifoam split foam       Waffle cushion      Rectal tube or BMS      Antifungal tx  x  Turn q 2 hours x  Up to chair  Ambulate   PT/OT     Dietician   x   PO "     TF x  TPN     PVN    NPO   # days   Other       WOUND TEAM PLAN OF CARE (X):   NPWT change 3 x week:        Dressing changes by wound team:       Follow up as needed:   x    Other (explain):    Anticipated discharge plans (X):  SNF:           Home Care:           Outpatient Wound Center:            Self Care:            Other:  tbd

## 2017-01-20 ENCOUNTER — APPOINTMENT (OUTPATIENT)
Dept: RADIOLOGY | Facility: MEDICAL CENTER | Age: 72
DRG: 025 | End: 2017-01-20
Attending: INTERNAL MEDICINE
Payer: MEDICARE

## 2017-01-20 ENCOUNTER — APPOINTMENT (OUTPATIENT)
Dept: RADIOLOGY | Facility: MEDICAL CENTER | Age: 72
DRG: 025 | End: 2017-01-20
Attending: EMERGENCY MEDICINE
Payer: MEDICARE

## 2017-01-20 LAB
ANION GAP SERPL CALC-SCNC: 7 MMOL/L (ref 0–11.9)
ANION GAP SERPL CALC-SCNC: 9 MMOL/L (ref 0–11.9)
BUN SERPL-MCNC: 75 MG/DL (ref 8–22)
BUN SERPL-MCNC: 82 MG/DL (ref 8–22)
CALCIUM SERPL-MCNC: 7.4 MG/DL (ref 8.5–10.5)
CALCIUM SERPL-MCNC: 7.6 MG/DL (ref 8.5–10.5)
CHLORIDE SERPL-SCNC: 106 MMOL/L (ref 96–112)
CHLORIDE SERPL-SCNC: 106 MMOL/L (ref 96–112)
CO2 SERPL-SCNC: 26 MMOL/L (ref 20–33)
CO2 SERPL-SCNC: 28 MMOL/L (ref 20–33)
CREAT SERPL-MCNC: 3.98 MG/DL (ref 0.5–1.4)
CREAT SERPL-MCNC: 4.1 MG/DL (ref 0.5–1.4)
ERYTHROCYTE [DISTWIDTH] IN BLOOD BY AUTOMATED COUNT: 52.5 FL (ref 35.9–50)
GFR SERPL CREATININE-BSD FRML MDRD: 11 ML/MIN/1.73 M 2
GFR SERPL CREATININE-BSD FRML MDRD: 11 ML/MIN/1.73 M 2
GLUCOSE BLD-MCNC: 104 MG/DL (ref 65–99)
GLUCOSE BLD-MCNC: 109 MG/DL (ref 65–99)
GLUCOSE BLD-MCNC: 114 MG/DL (ref 65–99)
GLUCOSE BLD-MCNC: 167 MG/DL (ref 65–99)
GLUCOSE BLD-MCNC: 221 MG/DL (ref 65–99)
GLUCOSE SERPL-MCNC: 156 MG/DL (ref 65–99)
GLUCOSE SERPL-MCNC: 255 MG/DL (ref 65–99)
HCT VFR BLD AUTO: 32.6 % (ref 37–47)
HGB BLD-MCNC: 10.9 G/DL (ref 12–16)
IRON SATN MFR SERPL: 33 % (ref 15–55)
IRON SERPL-MCNC: 51 UG/DL (ref 40–170)
MCH RBC QN AUTO: 31.9 PG (ref 27–33)
MCHC RBC AUTO-ENTMCNC: 33.4 G/DL (ref 33.6–35)
MCV RBC AUTO: 95.3 FL (ref 81.4–97.8)
PLATELET # BLD AUTO: 87 K/UL (ref 164–446)
PMV BLD AUTO: 11.9 FL (ref 9–12.9)
POTASSIUM SERPL-SCNC: 5 MMOL/L (ref 3.6–5.5)
POTASSIUM SERPL-SCNC: 5 MMOL/L (ref 3.6–5.5)
RBC # BLD AUTO: 3.42 M/UL (ref 4.2–5.4)
SODIUM SERPL-SCNC: 141 MMOL/L (ref 135–145)
SODIUM SERPL-SCNC: 141 MMOL/L (ref 135–145)
TIBC SERPL-MCNC: 155 UG/DL (ref 250–450)
WBC # BLD AUTO: 16.9 K/UL (ref 4.8–10.8)

## 2017-01-20 PROCEDURE — 82962 GLUCOSE BLOOD TEST: CPT

## 2017-01-20 PROCEDURE — 90935 HEMODIALYSIS ONE EVALUATION: CPT

## 2017-01-20 PROCEDURE — G8997 SWALLOW GOAL STATUS: HCPCS | Mod: CJ

## 2017-01-20 PROCEDURE — 99233 SBSQ HOSP IP/OBS HIGH 50: CPT | Performed by: INTERNAL MEDICINE

## 2017-01-20 PROCEDURE — A9270 NON-COVERED ITEM OR SERVICE: HCPCS | Performed by: INTERNAL MEDICINE

## 2017-01-20 PROCEDURE — A9270 NON-COVERED ITEM OR SERVICE: HCPCS | Performed by: HOSPITALIST

## 2017-01-20 PROCEDURE — 02H633Z INSERTION OF INFUSION DEVICE INTO RIGHT ATRIUM, PERCUTANEOUS APPROACH: ICD-10-PCS | Performed by: RADIOLOGY

## 2017-01-20 PROCEDURE — 700102 HCHG RX REV CODE 250 W/ 637 OVERRIDE(OP): Performed by: PHARMACIST

## 2017-01-20 PROCEDURE — 77001 FLUOROGUIDE FOR VEIN DEVICE: CPT

## 2017-01-20 PROCEDURE — 36558 INSERT TUNNELED CV CATH: CPT

## 2017-01-20 PROCEDURE — 700102 HCHG RX REV CODE 250 W/ 637 OVERRIDE(OP): Performed by: HOSPITALIST

## 2017-01-20 PROCEDURE — G8996 SWALLOW CURRENT STATUS: HCPCS | Mod: CL

## 2017-01-20 PROCEDURE — 83550 IRON BINDING TEST: CPT

## 2017-01-20 PROCEDURE — 700111 HCHG RX REV CODE 636 W/ 250 OVERRIDE (IP)

## 2017-01-20 PROCEDURE — 770009 HCHG ROOM/CARE - ONCOLOGY SEMI PRI*

## 2017-01-20 PROCEDURE — 0JH60XZ INSERTION OF TUNNELED VASCULAR ACCESS DEVICE INTO CHEST SUBCUTANEOUS TISSUE AND FASCIA, OPEN APPROACH: ICD-10-PCS | Performed by: RADIOLOGY

## 2017-01-20 PROCEDURE — 700102 HCHG RX REV CODE 250 W/ 637 OVERRIDE(OP): Performed by: INTERNAL MEDICINE

## 2017-01-20 PROCEDURE — 92610 EVALUATE SWALLOWING FUNCTION: CPT

## 2017-01-20 PROCEDURE — 83540 ASSAY OF IRON: CPT

## 2017-01-20 PROCEDURE — A9270 NON-COVERED ITEM OR SERVICE: HCPCS

## 2017-01-20 PROCEDURE — A9270 NON-COVERED ITEM OR SERVICE: HCPCS | Performed by: PHARMACIST

## 2017-01-20 PROCEDURE — 76937 US GUIDE VASCULAR ACCESS: CPT

## 2017-01-20 PROCEDURE — 700111 HCHG RX REV CODE 636 W/ 250 OVERRIDE (IP): Performed by: RADIOLOGY

## 2017-01-20 PROCEDURE — 700111 HCHG RX REV CODE 636 W/ 250 OVERRIDE (IP): Performed by: NEUROLOGICAL SURGERY

## 2017-01-20 PROCEDURE — 700111 HCHG RX REV CODE 636 W/ 250 OVERRIDE (IP): Performed by: HOSPITALIST

## 2017-01-20 PROCEDURE — 36415 COLL VENOUS BLD VENIPUNCTURE: CPT

## 2017-01-20 PROCEDURE — 85027 COMPLETE CBC AUTOMATED: CPT | Mod: 91

## 2017-01-20 PROCEDURE — 700101 HCHG RX REV CODE 250

## 2017-01-20 PROCEDURE — C1894 INTRO/SHEATH, NON-LASER: HCPCS

## 2017-01-20 PROCEDURE — 80048 BASIC METABOLIC PNL TOTAL CA: CPT

## 2017-01-20 PROCEDURE — 700102 HCHG RX REV CODE 250 W/ 637 OVERRIDE(OP)

## 2017-01-20 RX ORDER — MIDAZOLAM HYDROCHLORIDE 1 MG/ML
.5-2 INJECTION INTRAMUSCULAR; INTRAVENOUS PRN
Status: ACTIVE | OUTPATIENT
Start: 2017-01-20 | End: 2017-01-20

## 2017-01-20 RX ORDER — MIDAZOLAM HYDROCHLORIDE 1 MG/ML
.5-2 INJECTION INTRAMUSCULAR; INTRAVENOUS PRN
Status: DISCONTINUED | OUTPATIENT
Start: 2017-01-20 | End: 2017-01-20

## 2017-01-20 RX ORDER — MIDAZOLAM HYDROCHLORIDE 1 MG/ML
INJECTION INTRAMUSCULAR; INTRAVENOUS
Status: COMPLETED
Start: 2017-01-20 | End: 2017-01-20

## 2017-01-20 RX ORDER — LIDOCAINE HYDROCHLORIDE AND EPINEPHRINE BITARTRATE 20; .01 MG/ML; MG/ML
INJECTION, SOLUTION SUBCUTANEOUS
Status: COMPLETED
Start: 2017-01-20 | End: 2017-01-20

## 2017-01-20 RX ORDER — ACETAMINOPHEN 325 MG/1
650 TABLET ORAL EVERY 4 HOURS PRN
Status: DISCONTINUED | OUTPATIENT
Start: 2017-01-20 | End: 2017-01-27 | Stop reason: HOSPADM

## 2017-01-20 RX ORDER — HEPARIN SODIUM 5000 [USP'U]/ML
5000 INJECTION, SOLUTION INTRAVENOUS; SUBCUTANEOUS EVERY 8 HOURS
Status: DISCONTINUED | OUTPATIENT
Start: 2017-01-20 | End: 2017-01-20

## 2017-01-20 RX ORDER — ONDANSETRON 2 MG/ML
INJECTION INTRAMUSCULAR; INTRAVENOUS
Status: COMPLETED
Start: 2017-01-20 | End: 2017-01-20

## 2017-01-20 RX ORDER — CEFAZOLIN SODIUM 1 G/3ML
INJECTION, POWDER, FOR SOLUTION INTRAMUSCULAR; INTRAVENOUS
Status: COMPLETED
Start: 2017-01-20 | End: 2017-01-20

## 2017-01-20 RX ORDER — ONDANSETRON 2 MG/ML
4 INJECTION INTRAMUSCULAR; INTRAVENOUS PRN
Status: DISCONTINUED | OUTPATIENT
Start: 2017-01-20 | End: 2017-01-20

## 2017-01-20 RX ORDER — ONDANSETRON 2 MG/ML
4 INJECTION INTRAMUSCULAR; INTRAVENOUS PRN
Status: ACTIVE | OUTPATIENT
Start: 2017-01-20 | End: 2017-01-20

## 2017-01-20 RX ORDER — SODIUM CHLORIDE 9 MG/ML
500 INJECTION, SOLUTION INTRAVENOUS PRN
Status: DISCONTINUED | OUTPATIENT
Start: 2017-01-20 | End: 2017-01-23

## 2017-01-20 RX ORDER — DEXAMETHASONE SODIUM PHOSPHATE 4 MG/ML
2 INJECTION, SOLUTION INTRA-ARTICULAR; INTRALESIONAL; INTRAMUSCULAR; INTRAVENOUS; SOFT TISSUE EVERY 6 HOURS
Status: DISCONTINUED | OUTPATIENT
Start: 2017-01-20 | End: 2017-01-21

## 2017-01-20 RX ORDER — HEPARIN SODIUM 1000 [USP'U]/ML
INJECTION, SOLUTION INTRAVENOUS; SUBCUTANEOUS
Status: COMPLETED
Start: 2017-01-20 | End: 2017-01-20

## 2017-01-20 RX ADMIN — DEXAMETHASONE SODIUM PHOSPHATE 2 MG: 4 INJECTION, SOLUTION INTRAMUSCULAR; INTRAVENOUS at 12:06

## 2017-01-20 RX ADMIN — ACETAMINOPHEN 650 MG: 325 TABLET, FILM COATED ORAL at 08:43

## 2017-01-20 RX ADMIN — ACETAMINOPHEN 650 MG: 325 TABLET, FILM COATED ORAL at 13:21

## 2017-01-20 RX ADMIN — ONDANSETRON 4 MG: 2 INJECTION, SOLUTION INTRAMUSCULAR; INTRAVENOUS at 15:09

## 2017-01-20 RX ADMIN — HEPARIN SODIUM 2600 UNITS: 1000 INJECTION, SOLUTION INTRAVENOUS; SUBCUTANEOUS at 18:45

## 2017-01-20 RX ADMIN — METOPROLOL TARTRATE 50 MG: 50 TABLET, FILM COATED ORAL at 19:45

## 2017-01-20 RX ADMIN — Medication 1 TABLET: at 19:45

## 2017-01-20 RX ADMIN — HYDRALAZINE HYDROCHLORIDE 50 MG: 50 TABLET ORAL at 05:37

## 2017-01-20 RX ADMIN — FENTANYL CITRATE 25 MCG: 50 INJECTION, SOLUTION INTRAMUSCULAR; INTRAVENOUS at 15:03

## 2017-01-20 RX ADMIN — DEXAMETHASONE SODIUM PHOSPHATE 2 MG: 4 INJECTION, SOLUTION INTRAMUSCULAR; INTRAVENOUS at 23:36

## 2017-01-20 RX ADMIN — METOPROLOL TARTRATE 50 MG: 50 TABLET, FILM COATED ORAL at 08:43

## 2017-01-20 RX ADMIN — HYDRALAZINE HYDROCHLORIDE 50 MG: 50 TABLET ORAL at 23:36

## 2017-01-20 RX ADMIN — SIMVASTATIN 10 MG: 20 TABLET, FILM COATED ORAL at 19:43

## 2017-01-20 RX ADMIN — DEXAMETHASONE SODIUM PHOSPHATE 4 MG: 4 INJECTION, SOLUTION INTRAMUSCULAR; INTRAVENOUS at 05:36

## 2017-01-20 RX ADMIN — LEVETIRACETAM 500 MG: 100 SOLUTION ORAL at 19:45

## 2017-01-20 RX ADMIN — AMLODIPINE BESYLATE 10 MG: 10 TABLET ORAL at 08:43

## 2017-01-20 RX ADMIN — CEFAZOLIN 1000 MG: 1 INJECTION, POWDER, FOR SOLUTION INTRAVENOUS at 15:00

## 2017-01-20 RX ADMIN — MIDAZOLAM HYDROCHLORIDE 1 MG: 1 INJECTION, SOLUTION INTRAMUSCULAR; INTRAVENOUS at 15:07

## 2017-01-20 RX ADMIN — MIDAZOLAM 1 MG: 1 INJECTION INTRAMUSCULAR; INTRAVENOUS at 15:03

## 2017-01-20 RX ADMIN — LIDOCAINE HYDROCHLORIDE AND EPINEPHRINE: 20; 10 INJECTION, SOLUTION INFILTRATION; PERINEURAL at 15:09

## 2017-01-20 RX ADMIN — MIDAZOLAM HYDROCHLORIDE 1 MG: 1 INJECTION, SOLUTION INTRAMUSCULAR; INTRAVENOUS at 15:05

## 2017-01-20 RX ADMIN — HEPARIN 500 UNITS: 100 SYRINGE at 15:10

## 2017-01-20 RX ADMIN — INSULIN GLARGINE 20 UNITS: 100 INJECTION, SOLUTION SUBCUTANEOUS at 19:59

## 2017-01-20 RX ADMIN — DEXAMETHASONE SODIUM PHOSPHATE 2 MG: 4 INJECTION, SOLUTION INTRAMUSCULAR; INTRAVENOUS at 19:42

## 2017-01-20 RX ADMIN — MIDAZOLAM HYDROCHLORIDE 1 MG: 1 INJECTION, SOLUTION INTRAMUSCULAR; INTRAVENOUS at 15:03

## 2017-01-20 RX ADMIN — MICONAZOLE NITRATE: 20 CREAM TOPICAL at 19:45

## 2017-01-20 RX ADMIN — SODIUM BICARBONATE TAB 650 MG 1300 MG: 650 TAB at 08:43

## 2017-01-20 RX ADMIN — LEVETIRACETAM 500 MG: 100 SOLUTION ORAL at 08:43

## 2017-01-20 RX ADMIN — FENTANYL CITRATE 25 MCG: 50 INJECTION, SOLUTION INTRAMUSCULAR; INTRAVENOUS at 15:06

## 2017-01-20 RX ADMIN — MICONAZOLE NITRATE: 20 CREAM TOPICAL at 08:43

## 2017-01-20 ASSESSMENT — ENCOUNTER SYMPTOMS
NAUSEA: 1
SHORTNESS OF BREATH: 0
FOCAL WEAKNESS: 1
CHILLS: 0
EYE PAIN: 0
CLAUDICATION: 0
FEVER: 0
BACK PAIN: 0
BLURRED VISION: 0
EYE DISCHARGE: 0
DIZZINESS: 0
HEARTBURN: 0
WEAKNESS: 0
DEPRESSION: 0
HEADACHES: 0
SEIZURES: 0
COUGH: 0
ABDOMINAL PAIN: 0
DIARRHEA: 0
NECK PAIN: 0
INSOMNIA: 0
NERVOUS/ANXIOUS: 0
MYALGIAS: 1
HEADACHES: 1

## 2017-01-20 ASSESSMENT — PAIN SCALES - GENERAL
PAINLEVEL_OUTOF10: 4
PAINLEVEL_OUTOF10: 0
PAINLEVEL_OUTOF10: 0

## 2017-01-20 NOTE — PROGRESS NOTES
"Hospital Medicine Interval Note  Date of Service:  1/19/2017    Chief Complaint  71 y.o.-year-old female admitted 1/7/2017 with GBM, sp resection on 1/15/17.    Interval Problem Update  Patient doing okay, she tells me she has \"brain cancer and is not having any pain.\"  There was confusion earlier in the day as to if patient had been told her diagnosis, I explained it in detail to patient and niece to remove any confusion regarding patient's understanding of her cancer.    Consultants/Specialty  Oncology - [facility] - [facility]    Disposition  tbd     Review of Systems   Constitutional: Negative for fever and chills.   HENT: Negative for nosebleeds.    Eyes: Negative for blurred vision, pain and discharge.   Respiratory: Negative for cough and shortness of breath.    Cardiovascular: Negative for chest pain, claudication and leg swelling.   Gastrointestinal: Negative for heartburn, abdominal pain and diarrhea.   Genitourinary: Negative for dysuria and frequency.   Musculoskeletal: Positive for myalgias. Negative for back pain and neck pain.   Skin: Negative for itching and rash.   Neurological: Positive for focal weakness. Negative for dizziness, seizures, weakness and headaches.   Psychiatric/Behavioral: Negative for depression. The patient is not nervous/anxious and does not have insomnia.       Physical Exam Laboratory/Imaging   Filed Vitals:    01/19/17 0330 01/19/17 0800 01/19/17 1130 01/19/17 1352   BP: 136/68 150/45 141/50 146/69   Pulse: 87 87 74 80   Temp: 36.8 °C (98.3 °F) 36.1 °C (97 °F) 36.6 °C (97.8 °F)    Resp: 16 16 16    Height: 1.575 m (5' 2\")      Weight: 74.1 kg (163 lb 5.8 oz)      SpO2: 94% 93% 93%      Physical Exam   Constitutional: She is oriented to person, place, and time. She appears well-developed and well-nourished. No distress.   HENT:   Head: Normocephalic.   Staples on scalp, healing incision.   Eyes: Conjunctivae are normal. No scleral icterus.   Neck: Neck supple. No JVD present. "   Cardiovascular: Normal rate, regular rhythm, normal heart sounds and intact distal pulses.  Exam reveals no gallop and no friction rub.    No murmur heard.  Pulmonary/Chest: Effort normal and breath sounds normal. No respiratory distress. She has no wheezes. She exhibits no tenderness.   Abdominal: Soft. Bowel sounds are normal. There is no tenderness.   Musculoskeletal: She exhibits no edema or tenderness.   Neurological: She is alert and oriented to person, place, and time. No cranial nerve deficit.   Skin: Skin is warm and dry. She is not diaphoretic. No erythema.   Psychiatric: She has a normal mood and affect. Her behavior is normal.   Nursing note and vitals reviewed.   Lab Results   Component Value Date/Time    WBC 16.7* 01/19/2017 05:15 AM    HEMOGLOBIN 10.5* 01/19/2017 05:15 AM    HEMATOCRIT 30.4* 01/19/2017 05:15 AM    PLATELET COUNT 85* 01/19/2017 05:15 AM     Lab Results   Component Value Date/Time    SODIUM 140 01/19/2017 12:50 PM    POTASSIUM 4.9 01/19/2017 12:50 PM    CHLORIDE 107 01/19/2017 12:50 PM    CO2 24 01/19/2017 12:50 PM    GLUCOSE 229* 01/19/2017 12:50 PM    BUN 64* 01/19/2017 12:50 PM    CREATININE 3.57* 01/19/2017 12:50 PM      Assessment/Plan    Glioblastoma (CMS-HCC)  Assessment & Plan  Grade IV based on pathology  Edgar morales oncologist - Aime is primary  Dr Martinez consulted this am - mapping 2/6/17    Type 2 diabetes mellitus with stage 3 chronic kidney disease (CMS-HCC)  Assessment & Plan  Lantus 20  SSi  Cortrak with diabetasource      Dyslipidemia  Assessment & Plan  zocor as home medication - okay to continue to hold      Protein-calorie malnutrition, severe (CMS-HCC)  Assessment & Plan  Continue cortrak for nutrition      Thrombocytopenia (CMS-HCC)  Assessment & Plan  Stable in 80s, continue to follow counts  No AC given recent brain surgery      Renal failure, acute on chronic (CMS-HCC)  Assessment & Plan  Currently on HD, nephrology  following      Hyponatremia  Assessment & Plan  Na up to 140, can cut back on 3% change rate to 10c/hour      HTN (hypertension)  Assessment & Plan  Norvasc, metoprolol  PRN medications with fluctuating fluid status between HD, 3%NS     Labs reviewed, Medications reviewed and Radiology images reviewed  Hillman catheter: Monitoring for Diabetes Insipidus in Neurology Patients

## 2017-01-20 NOTE — ASSESSMENT & PLAN NOTE
Grade IV based on pathology  Edgar is rounding oncologist - Aime is primary  Dr Martinez consulted this am - mapping 2/6/17  Cannot have chemotherapy while on HD  Steroid taper per NSG

## 2017-01-20 NOTE — CARE PLAN
Problem: Safety  Goal: Will remain free from injury  Outcome: PROGRESSING AS EXPECTED  Bed alarm in use  Pt educated on call light    Problem: Urinary Elimination:  Goal: Ability to reestablish a normal urinary elimination pattern will improve  Outcome: PROGRESSING SLOWER THAN EXPECTED  Hillman in place  Decreasing urine output

## 2017-01-20 NOTE — THERAPY
"  Speech Language Therapy Clinical Swallow Evaluation completed.  Functional Status: Patient was seen for a clinical bedside swallow evaluation this date. Patient was upright in bed with family present at bedside. Pt demonstrated delayed responses. poor attention to task, easily distracted, and repetition of information required. Patient with reduced ROM of lingual/labial structures with weakness noted. PO trials of ice, thins, nectars, and purees were consumed. Pt demonstrated delayed onset of swallow, decreased laryngeal elevation and hyolaryngeal excursion noted upon palpation. Pt impulsive requiring direct 1:1 cues. Slight throat clearing and delayed cough x1 noted during trials of thin liquids. Pt with changes in vocal quality with trials of NTL and thins via side of cup or with straw. Pt did fatigue quickly. At this time recommend continue TF with 3 NTFL snacks per day via spoon with MAX 1:1 feeding. Education was provided to patients family at bedside. SLP to continue to follow for dysphagia tx and completion of full cognitive evaluation.   Recommendations - Diet: Diet / Liquid Recommendation: Other (Comments) (3 NTL snacks per day/ NO FULL MEAL TRAYS)                          Strategies: Strict 1:1 feeding , Direct supervision during meals, No Straws and Head of Bed at 90 Degrees                          Medication Administration: Medication Administration : Via Gastric Tube  Plan of Care: Will benefit from Speech Therapy 3 times per week    See \"Rehab Therapy-Acute\" Patient Summary Report for complete documentation.   "

## 2017-01-20 NOTE — PROGRESS NOTES
Cortrak Placement    Tube Team verified patient name and medical record number prior to tube placement.  Cortrak tube (43 inches, 10 Canadian) placed at 67 cm in right nare.  Per Cortrak picture, tube appears to be in the stomach  .  Nursing Instructions: Awaiting KUB to confirm placement before use for medications or feeding. Once placement confirmed, flush tube with 30 ml of water, and then remove and save stylet, in patient medication drawer.

## 2017-01-20 NOTE — ASSESSMENT & PLAN NOTE
Currently on HD, nephrology following  Tunneled cath placed 1/20 without complication.  Poor urine production and increase in cr through weekend  HD resumed today

## 2017-01-20 NOTE — PROGRESS NOTES
"Met with patient and family at bedside.  Introduced self to patient.  Pt states her brain is not working as well as it should be and she is having difficulty with \"answers\".  Pt denies questions at this time except wanting to know how long she will be in the hospital.  Encouraged her to discuss with physician, but that she will need to get stronger and recover a little more.  Provided contact information.  Available if needed inpatient.  Will be available as needed once discharged.  "

## 2017-01-20 NOTE — CARE PLAN
Problem: Mobility  Goal: Risk for activity intolerance will decrease  Outcome: PROGRESSING SLOWER THAN EXPECTED  Plan to work with PT/OT, but unavailable at this time. RN into room to help with ROM and increase activity. Pt c/o pain/stiffness to LE's. Will continue to encourage mobilization as tolerated.     Problem: Urinary Elimination:  Goal: Ability to reestablish a normal urinary elimination pattern will improve  Outcome: PROGRESSING AS EXPECTED  Hillman catheter in place, pt has minimal output due to renal failure and dialysis. Will continue to closely monitor output.

## 2017-01-20 NOTE — PROGRESS NOTES
Pt AA&O x 2, confused to the year and situation but able to reorient patient. Pt able to follow commands and answer most questions when asked. Plan for hemodialysis catheter to be placed today in IR. Tube feeding stopped at 0715. Consent signed and in place. PIV infusing hypertonic saline 3%. Na 141, orders to decrease rate to 5ml/hr. Cortrak in place with bridle. Plan for therapies to work with patient. PT/OT paged. SLP working with patient at this time. Family at bedside. All questions answered at this time and patient agreeable to POC. Fall precautions in place, call light within reach and hourly rounding in progress. Q2H in place. Will continue to monitor and assess.

## 2017-01-20 NOTE — OR SURGEON
Immediate Post- Operative Note        PostOp Diagnosis: NEEDS DIALYSIS      Procedure(s):TUNNELLED RIJ HD CATH    Estimated Blood Loss: Less than 5 ml        Complications: None            1/20/2017     3:10 PM     Gene Helm

## 2017-01-20 NOTE — ASSESSMENT & PLAN NOTE
Dropped to 57 today  HIT antibody requested by oncology - ordered.  Heparin okay per NSG but will continue to hold given low platelets.  Has received heparin flush to HD catheter this weekend

## 2017-01-20 NOTE — PROGRESS NOTES
Progress Note               Author: Bry Ventura MD Date & Time created: 2017  6:03 AM     Interval History:  POD #5 crani and tumor resection  Path is GBM    Mental status at pre-op status, A/Ox2  LUE and LLE much stronger 4/5    Seen by oncology     Review of Systems:  Review of Systems   Gastrointestinal: Positive for nausea.   Neurological: Positive for headaches.       Physical Exam:  Physical Exam   Neurological:   A/o x2 [place], mild left sided weakness, incision CDI       Labs:        Invalid input(s): [id number]      Recent Labs      17   1250  17   1714  17   2357   SODIUM  140  140  141   POTASSIUM  4.9  5.0  5.0   CHLORIDE  107  106  106   CO2  24  26  26   BUN  64*  69*  75*   CREATININE  3.57*  3.59*  3.98*   CALCIUM  7.3*  7.6*  7.4*     Recent Labs      17   1250  17   1714  17   2357   GLUCOSE  229*  261*  255*     Recent Labs      17   0037  17   0515  17   0512   RBC  3.54*  3.37*  3.42*   HEMOGLOBIN  11.1*  10.5*  10.9*   HEMATOCRIT  31.5*  30.4*  32.6*   PLATELETCT  89*  85*  87*   IRON  85  46  51   FERRITIN  378.8*   --    --    TOTIRONBC  146*  141*  155*     Recent Labs      17   0037  17   0515  17   0512   WBC  21.1*  16.7*  16.9*     Hemodynamics:  Temp (24hrs), Av.5 °C (97.7 °F), Min:36.1 °C (97 °F), Max:36.7 °C (98 °F)  Temperature: 36.7 °C (98 °F)  Pulse  Av.4  Min: 52  Max: 120   Blood Pressure : 155/65 mmHg (RN notified)     Respiratory:    Respiration: 18, Pulse Oximetry: 93 %     Work Of Breathing / Effort: Shallow;Mild  RUL Breath Sounds: Clear, RML Breath Sounds: Diminished, RLL Breath Sounds: Diminished, SOURAV Breath Sounds: Clear, LLL Breath Sounds: Diminished  Fluids:    Intake/Output Summary (Last 24 hours) at 17 0826  Last data filed at 17 0200   Gross per 24 hour   Intake   2030 ml   Output   3195 ml   Net  -1165 ml        GI/Nutrition:  No orders of the defined types were  placed in this encounter.     Medical Decision Making, by Problem:  Active Hospital Problems    Diagnosis   • Glioblastoma (CMS-HCC) [C71.9]   • Type 2 diabetes mellitus with stage 3 chronic kidney disease (CMS-HCC) [E11.22, N18.3]   • HTN (hypertension) [I10]   • Protein-calorie malnutrition, severe (CMS-HCC) [E43]   • Thrombocytopenia (CMS-HCC) [D69.6]   • Renal failure, acute on chronic (CMS-HCC) [N17.9, N18.9]   • Dyslipidemia [E78.5]       Plan:  Patient improving well  Needs aggressive PT/OT/ST  Ok for heparin 5000u sq q8hrs  Will start weaning steroids    Labs reviewed, Radiology images reviewed and Medications reviewed

## 2017-01-20 NOTE — PROGRESS NOTES
IR Note:  Dr Helm completed placement of tunneled hemodialysis catheter to right IJ.  Patient tolerated procedure well and remained hemodynamically stable.  Report given to dialysis RN and patient taken to dialysis after procedure.

## 2017-01-20 NOTE — PROGRESS NOTES
[facility] Nephrology Progress Note               Author: DORYS Roberts MD, resident & Nikolay Pierce Date & Time created: 1/20/2017  1:08 PM     Interval History:  71 year old female with PMHx DM type 2, CKD who presented with headache and N/V and was found to have large right temporal intracranial mass on CT head ---> MRI brain reveals large right temporal lobe mass with extensive vasogenic edema most consistent with glioblastoma multiforme. There is R>L midline shift.    Consult to manage ANNABEL on CKD - patient has repeatedly said as outpatient - under no circumstances would she accept dialysis either temporarily or permanently.  HOWEVER - she has recanted that statement to me at this point.    DAILY NEPHROLOGY SUMMARY  1/08/17 - seen in consultation by Dr Chua.  ANNABEL felt likely due to pre-renal azotemia from poor intake w Nausea/headaches  1/09/17 - renal function not improved - no fluids given overnight - initiating x 1L  1/10/17 - Cr dropped from 5.17 to 5.09. K: 5.2. Nearly euvolemic. Better appetite. No indication to start HD now.    1/11/17 - MR done again on Khari 10. Nephro team double checked with Radiology office at [phone], the MR was done WITHOUT contrast. No HD today even her Cr is going up. No subjective uremic complaints. Near euvolemic status.    1/12/17 - HD catheter done at right IJ. No complication noted.    1/13/17 - Elevated Bun/Cr/K. No much subjective uremic symptoms. No symptoms from high K (5.7) 1st HD on Jan 13.   1/14/17 - s/p HD #1 yesterday without complication  Running again today in preparation for surgery tomorrow  1/15/17 - tolerated HD yesterday. NeuroSugery on Khari 15.   1/16/17 - Slow response to my interview. No much subjective complaints. Mild drowsiness noted. Bun/Cr from 1/15: 84/4.8 to 90/4.8. K at 5.3. No HD on 01/16.  1/17/17 - HD done smoothly in the morning for high Bun/Cr and low Na/high K. UF 2L. Low PLT at 85, trending down from 200K. No thrombosis or bleeding found.     1/18/17 - PLT stable at 89. Bun/Cr: 66/4. K4.7, Na 136. Will do another HD today to improve Na, reduce K/Bun/Cr.   1/19/17 -PLT stable at 85. Bun/Cr 57/3.3. Better consciousness level. Better left side muscle power. Will arrange Gao for her.   1/20/17- HD today as her schedule. Waiting for perm cath from IR team. Urine output 375 in past 24 hours.     Review of Systems   Limited ROS due to major brain surgery.   Constitutional: No fever  Cardiovascular: Denies chest pain or palpitations   Respiratory: Mild SOB, not worsening.   Gastrointestinal/Hepatic: Still mild N/V.   Genitourinary: Denies bladder dysfunction, dysuria or frequency  Musculoskeletal/Rheum: No complaints   Skin/Breast: Denies rash   Neurological: Left side weakness found yesterday improved.   All other systems were reviewed and are negative (AMA/CMS criteria)      Physical Exam     HEENT: s/p Surgery, stable wound.     Cardiovascular: S1 S2 noted.   Pulmonary/Chest: Effort normal and breath sounds normal. No respiratory distress.\  Abdominal: No tenderness, no rebounding pain  Musculoskeletal: Normal range of motion.   Neurological: Drowsiness improved. Left side muscle power improved. Working with our PT at bedside.    Psychiatric: Cant evaluate   Medication, lab, code status, vitals, images and orders were reviewed.         Labs:        Invalid input(s): [id number]      Recent Labs      01/19/17   1714  01/19/17   2357  01/20/17   0848   SODIUM  140  141  141   POTASSIUM  5.0  5.0  5.0   CHLORIDE  106  106  106   CO2  26  26  28   BUN  69*  75*  82*   CREATININE  3.59*  3.98*  4.10*   CALCIUM  7.6*  7.4*  7.6*     Recent Labs      01/19/17   1714  01/19/17   2357  01/20/17   0848   GLUCOSE  261*  255*  156*     Recent Labs      01/18/17   0037  01/19/17   0515  01/20/17   0512   RBC  3.54*  3.37*  3.42*   HEMOGLOBIN  11.1*  10.5*  10.9*   HEMATOCRIT  31.5*  30.4*  32.6*   PLATELETCT  89*  85*  87*   IRON  85  46  51   FERRITIN  378.8*    --    --    [facility]  146*  141*  155*     Recent Labs      17   0037  17   0515  17   0512   WBC  21.1*  16.7*  16.9*           Hemodynamics:  Temp (24hrs), Av.7 °C (98.1 °F), Min:36.4 °C (97.6 °F), Max:37 °C (98.6 °F)  Temperature: 37 °C (98.6 °F)  Pulse  Av.4  Min: 52  Max: 120   Blood Pressure : 151/59 mmHg     Respiratory:    Respiration: 18, Pulse Oximetry: 90 %     Work Of Breathing / Effort: Shallow;Mild  RUL Breath Sounds: Diminished, RML Breath Sounds: Diminished, RLL Breath Sounds: Diminished, SOURAV Breath Sounds: Diminished, LLL Breath Sounds: Diminished  Fluids:    Intake/Output Summary (Last 24 hours) at 17 1308  Last data filed at 17 0400   Gross per 24 hour   Intake    120 ml   Output    375 ml   Net   -255 ml        GI/Nutrition:  No orders of the defined types were placed in this encounter.     Medical Decision Making, by Problem:  Active Hospital Problems    Diagnosis       Assessment/Plan:  # Chronic kidney disease patient with new ESRD  -- iron Sat 38, ferrutin 235, not on ferrous. Hgb 11.    --  K: 5, HCO3: 26. On sodium bicarb. Corrected Ca: ~8.5, PO4:3.7, . Not on PO4 binder now.    -- s/p placement of dialysis catheter on 17   -- s/p HD on , , , ,   -- Waiting for perm cath, will arrange outpatient HD schedule for her.     # Intracranial Mass  --Neurosurgery on 1/15/17  --Per NeuroSurgery's note, tapering of steroid started.   --Oncology Dr. Salomon also following    # HTN, systolic around 150.   --Continue home BP meds  --On amlodipine, hydralazine, metoprolol now.     # DM II--management per primary svc  # Metabolic Acidosis, with HCO3 26--continue PO bicarb supplements  # PLT stable at 80-90K. No thrombosis/bleeding found.

## 2017-01-21 LAB
ANION GAP SERPL CALC-SCNC: 5 MMOL/L (ref 0–11.9)
ANION GAP SERPL CALC-SCNC: 6 MMOL/L (ref 0–11.9)
ANION GAP SERPL CALC-SCNC: 8 MMOL/L (ref 0–11.9)
BUN SERPL-MCNC: 43 MG/DL (ref 8–22)
BUN SERPL-MCNC: 55 MG/DL (ref 8–22)
BUN SERPL-MCNC: 65 MG/DL (ref 8–22)
CALCIUM SERPL-MCNC: 7.4 MG/DL (ref 8.5–10.5)
CALCIUM SERPL-MCNC: 7.4 MG/DL (ref 8.5–10.5)
CALCIUM SERPL-MCNC: 7.5 MG/DL (ref 8.5–10.5)
CHLORIDE SERPL-SCNC: 100 MMOL/L (ref 96–112)
CHLORIDE SERPL-SCNC: 103 MMOL/L (ref 96–112)
CHLORIDE SERPL-SCNC: 104 MMOL/L (ref 96–112)
CO2 SERPL-SCNC: 28 MMOL/L (ref 20–33)
CO2 SERPL-SCNC: 29 MMOL/L (ref 20–33)
CO2 SERPL-SCNC: 29 MMOL/L (ref 20–33)
CREAT SERPL-MCNC: 2.59 MG/DL (ref 0.5–1.4)
CREAT SERPL-MCNC: 2.93 MG/DL (ref 0.5–1.4)
CREAT SERPL-MCNC: 3.4 MG/DL (ref 0.5–1.4)
ERYTHROCYTE [DISTWIDTH] IN BLOOD BY AUTOMATED COUNT: 48.5 FL (ref 35.9–50)
GFR SERPL CREATININE-BSD FRML MDRD: 13 ML/MIN/1.73 M 2
GFR SERPL CREATININE-BSD FRML MDRD: 16 ML/MIN/1.73 M 2
GFR SERPL CREATININE-BSD FRML MDRD: 18 ML/MIN/1.73 M 2
GLUCOSE BLD-MCNC: 142 MG/DL (ref 65–99)
GLUCOSE BLD-MCNC: 171 MG/DL (ref 65–99)
GLUCOSE BLD-MCNC: 199 MG/DL (ref 65–99)
GLUCOSE BLD-MCNC: 229 MG/DL (ref 65–99)
GLUCOSE SERPL-MCNC: 123 MG/DL (ref 65–99)
GLUCOSE SERPL-MCNC: 181 MG/DL (ref 65–99)
GLUCOSE SERPL-MCNC: 252 MG/DL (ref 65–99)
HCT VFR BLD AUTO: 31.1 % (ref 37–47)
HGB BLD-MCNC: 10.2 G/DL (ref 12–16)
IRON SATN MFR SERPL: 38 % (ref 15–55)
IRON SERPL-MCNC: 57 UG/DL (ref 40–170)
MCH RBC QN AUTO: 29.9 PG (ref 27–33)
MCHC RBC AUTO-ENTMCNC: 32.8 G/DL (ref 33.6–35)
MCV RBC AUTO: 91.2 FL (ref 81.4–97.8)
PLATELET # BLD AUTO: 80 K/UL (ref 164–446)
PMV BLD AUTO: 12.1 FL (ref 9–12.9)
POTASSIUM SERPL-SCNC: 4.2 MMOL/L (ref 3.6–5.5)
POTASSIUM SERPL-SCNC: 4.2 MMOL/L (ref 3.6–5.5)
POTASSIUM SERPL-SCNC: 4.6 MMOL/L (ref 3.6–5.5)
RBC # BLD AUTO: 3.41 M/UL (ref 4.2–5.4)
SODIUM SERPL-SCNC: 136 MMOL/L (ref 135–145)
SODIUM SERPL-SCNC: 138 MMOL/L (ref 135–145)
SODIUM SERPL-SCNC: 138 MMOL/L (ref 135–145)
TIBC SERPL-MCNC: 151 UG/DL (ref 250–450)
WBC # BLD AUTO: 14.2 K/UL (ref 4.8–10.8)

## 2017-01-21 PROCEDURE — A9270 NON-COVERED ITEM OR SERVICE: HCPCS | Performed by: NURSE PRACTITIONER

## 2017-01-21 PROCEDURE — 700102 HCHG RX REV CODE 250 W/ 637 OVERRIDE(OP): Performed by: INTERNAL MEDICINE

## 2017-01-21 PROCEDURE — 700102 HCHG RX REV CODE 250 W/ 637 OVERRIDE(OP): Performed by: HOSPITALIST

## 2017-01-21 PROCEDURE — 770009 HCHG ROOM/CARE - ONCOLOGY SEMI PRI*

## 2017-01-21 PROCEDURE — A9270 NON-COVERED ITEM OR SERVICE: HCPCS | Performed by: INTERNAL MEDICINE

## 2017-01-21 PROCEDURE — 83550 IRON BINDING TEST: CPT

## 2017-01-21 PROCEDURE — 36415 COLL VENOUS BLD VENIPUNCTURE: CPT

## 2017-01-21 PROCEDURE — 700105 HCHG RX REV CODE 258: Performed by: INTERNAL MEDICINE

## 2017-01-21 PROCEDURE — A9270 NON-COVERED ITEM OR SERVICE: HCPCS | Performed by: HOSPITALIST

## 2017-01-21 PROCEDURE — 80048 BASIC METABOLIC PNL TOTAL CA: CPT

## 2017-01-21 PROCEDURE — A9270 NON-COVERED ITEM OR SERVICE: HCPCS | Performed by: PHARMACIST

## 2017-01-21 PROCEDURE — 700102 HCHG RX REV CODE 250 W/ 637 OVERRIDE(OP): Performed by: NURSE PRACTITIONER

## 2017-01-21 PROCEDURE — 700111 HCHG RX REV CODE 636 W/ 250 OVERRIDE (IP): Performed by: NEUROLOGICAL SURGERY

## 2017-01-21 PROCEDURE — 700102 HCHG RX REV CODE 250 W/ 637 OVERRIDE(OP): Performed by: PHARMACIST

## 2017-01-21 PROCEDURE — 700111 HCHG RX REV CODE 636 W/ 250 OVERRIDE (IP): Performed by: INTERNAL MEDICINE

## 2017-01-21 PROCEDURE — 82962 GLUCOSE BLOOD TEST: CPT

## 2017-01-21 PROCEDURE — 99233 SBSQ HOSP IP/OBS HIGH 50: CPT | Performed by: INTERNAL MEDICINE

## 2017-01-21 PROCEDURE — 83540 ASSAY OF IRON: CPT

## 2017-01-21 PROCEDURE — A9270 NON-COVERED ITEM OR SERVICE: HCPCS

## 2017-01-21 PROCEDURE — 700102 HCHG RX REV CODE 250 W/ 637 OVERRIDE(OP)

## 2017-01-21 RX ORDER — SODIUM CHLORIDE 9 MG/ML
500 INJECTION, SOLUTION INTRAVENOUS ONCE
Status: COMPLETED | OUTPATIENT
Start: 2017-01-21 | End: 2017-01-21

## 2017-01-21 RX ORDER — SODIUM CHLORIDE 9 MG/ML
INJECTION, SOLUTION INTRAVENOUS CONTINUOUS
Status: DISCONTINUED | OUTPATIENT
Start: 2017-01-21 | End: 2017-01-22

## 2017-01-21 RX ORDER — ALPRAZOLAM 0.25 MG/1
0.25 TABLET ORAL NIGHTLY PRN
Status: DISCONTINUED | OUTPATIENT
Start: 2017-01-21 | End: 2017-01-27 | Stop reason: HOSPADM

## 2017-01-21 RX ORDER — DEXAMETHASONE 4 MG/1
2 TABLET ORAL EVERY 8 HOURS
Status: DISCONTINUED | OUTPATIENT
Start: 2017-01-21 | End: 2017-01-23

## 2017-01-21 RX ORDER — DEXAMETHASONE SODIUM PHOSPHATE 4 MG/ML
2 INJECTION, SOLUTION INTRA-ARTICULAR; INTRALESIONAL; INTRAMUSCULAR; INTRAVENOUS; SOFT TISSUE EVERY 8 HOURS
Status: DISCONTINUED | OUTPATIENT
Start: 2017-01-21 | End: 2017-01-21

## 2017-01-21 RX ORDER — ENEMA 19; 7 G/133ML; G/133ML
1 ENEMA RECTAL PRN
Status: DISCONTINUED | OUTPATIENT
Start: 2017-01-21 | End: 2017-01-21

## 2017-01-21 RX ORDER — DEXAMETHASONE 4 MG/1
2 TABLET ORAL EVERY 8 HOURS
Status: DISCONTINUED | OUTPATIENT
Start: 2017-01-21 | End: 2017-01-21

## 2017-01-21 RX ADMIN — HYDRALAZINE HYDROCHLORIDE 50 MG: 50 TABLET ORAL at 15:16

## 2017-01-21 RX ADMIN — LEVETIRACETAM 500 MG: 100 SOLUTION ORAL at 10:32

## 2017-01-21 RX ADMIN — DEXAMETHASONE 2 MG: 4 TABLET ORAL at 21:14

## 2017-01-21 RX ADMIN — Medication 1 TABLET: at 21:14

## 2017-01-21 RX ADMIN — LEVETIRACETAM 500 MG: 100 SOLUTION ORAL at 21:15

## 2017-01-21 RX ADMIN — METOPROLOL TARTRATE 50 MG: 50 TABLET, FILM COATED ORAL at 10:31

## 2017-01-21 RX ADMIN — DEXAMETHASONE 2 MG: 4 TABLET ORAL at 15:18

## 2017-01-21 RX ADMIN — METOPROLOL TARTRATE 50 MG: 50 TABLET, FILM COATED ORAL at 21:14

## 2017-01-21 RX ADMIN — HYDRALAZINE HYDROCHLORIDE 50 MG: 50 TABLET ORAL at 05:48

## 2017-01-21 RX ADMIN — LACTULOSE 30 ML: 10 SOLUTION ORAL at 16:18

## 2017-01-21 RX ADMIN — BISACODYL 10 MG: 10 SUPPOSITORY RECTAL at 21:13

## 2017-01-21 RX ADMIN — AMLODIPINE BESYLATE 10 MG: 10 TABLET ORAL at 10:31

## 2017-01-21 RX ADMIN — SODIUM CHLORIDE: 9 INJECTION, SOLUTION INTRAVENOUS at 19:44

## 2017-01-21 RX ADMIN — SODIUM BICARBONATE TAB 650 MG 1300 MG: 650 TAB at 10:31

## 2017-01-21 RX ADMIN — SIMVASTATIN 10 MG: 20 TABLET, FILM COATED ORAL at 21:14

## 2017-01-21 RX ADMIN — SODIUM CHLORIDE 500 ML: 9 INJECTION, SOLUTION INTRAVENOUS at 18:32

## 2017-01-21 RX ADMIN — MICONAZOLE NITRATE: 20 CREAM TOPICAL at 11:14

## 2017-01-21 RX ADMIN — HYDRALAZINE HYDROCHLORIDE 50 MG: 50 TABLET ORAL at 21:25

## 2017-01-21 RX ADMIN — ALPRAZOLAM 0.25 MG: 0.25 TABLET ORAL at 23:20

## 2017-01-21 RX ADMIN — DEXAMETHASONE SODIUM PHOSPHATE 2 MG: 4 INJECTION, SOLUTION INTRAMUSCULAR; INTRAVENOUS at 05:48

## 2017-01-21 RX ADMIN — INSULIN GLARGINE 20 UNITS: 100 INJECTION, SOLUTION SUBCUTANEOUS at 21:38

## 2017-01-21 RX ADMIN — MICONAZOLE NITRATE: 20 CREAM TOPICAL at 21:15

## 2017-01-21 ASSESSMENT — ENCOUNTER SYMPTOMS
COUGH: 0
HEADACHES: 0
ABDOMINAL PAIN: 0
FEVER: 0
MYALGIAS: 1
WEAKNESS: 0
CHILLS: 0
FOCAL WEAKNESS: 1
BACK PAIN: 0
CLAUDICATION: 0
HEARTBURN: 0
DIARRHEA: 0
INSOMNIA: 0
BLURRED VISION: 0
EYE DISCHARGE: 0
SHORTNESS OF BREATH: 0
DIZZINESS: 0
NERVOUS/ANXIOUS: 0
EYE PAIN: 0
SEIZURES: 0
NECK PAIN: 0
DEPRESSION: 0

## 2017-01-21 ASSESSMENT — PAIN SCALES - GENERAL
PAINLEVEL_OUTOF10: 0
PAINLEVEL_OUTOF10: 0

## 2017-01-21 NOTE — PROGRESS NOTES
Hospital Medicine Progress Note, Adult, Complex               Author: Zulma Coronado Date & Time created: 1/21/2017  10:15 AM     Interval History:  71 year old female with PMHx DM type 2, CKD who presented with headache and N/V and was found to have large right temporal intracranial mass on CT head ---> MRI brain reveals large right temporal lobe mass with extensive vasogenic edema most consistent with glioblastoma multiforme. There is R>L midline shift.  Consult to manage ANNABEL on CKD - patient has repeatedly said as outpatient - under no circumstances would she accept dialysis either temporarily or permanently.  HOWEVER - she has recanted that statement to me at this point.    DAILY NEPHROLOGY SUMMARY  1/08/17 - seen in consultation by Dr Chua.  ANNABEL felt likely due to pre-renal azotemia from poor intake w Nausea/headaches  1/09/17 - renal function not improved - no fluids given overnight - initiating x 1L  1/10/17 - Cr dropped from 5.17 to 5.09. K: 5.2. Nearly euvolemic. Better appetite. No indication to start HD now.    1/11/17 - MR done again on Khari 10. Nephro team double checked with Radiology office at [phone], the MR was done WITHOUT contrast. No HD today even her Cr is going up. No subjective uremic complaints. Near euvolemic status.    1/12/17 - HD catheter done at right IJ. No complication noted.    1/13/17 - Elevated Bun/Cr/K. No much subjective uremic symptoms. No symptoms from high K (5.7) 1st HD on Jan 13.    1/14/17 - s/p HD #1 yesterday without complication  Running again today in preparation for surgery tomorrow  1/15/17 - tolerated HD yesterday. NeuroSugery on Khari 15.    1/16/17 - Slow response to my interview. No much subjective complaints. Mild drowsiness noted. Bun/Cr from 1/15: 84/4.8 to 90/4.8. K at 5.3. No HD on 01/16.  1/17/17 - HD done smoothly in the morning for high Bun/Cr and low Na/high K. UF 2L. Low PLT at 85, trending down from 200K. No thrombosis or bleeding found.    1/18/17  - PLT stable at 89. Bun/Cr: 66/4. K4.7, Na 136. Will do another HD today to improve Na, reduce K/Bun/Cr.    17 -PLT stable at 85. Bun/Cr 57/3.3. Better consciousness level. Better left side muscle power. Will arrange Gao for her.    17- HD today as her schedule. Waiting for perm cath from IR team. Urine output 375 in past 24 hours.   17  -  S/p Hemodialysis treatment on 2017 initiated on ,Net UF: 2200 ml.   S/p Perm-cath placement, Good UOP , holding HD for now, apears on the dry side, no IV fluid and NPO, we will start gentle fluid hydration.         Review of Systems:  ROS    Physical Exam:  Physical Exam    Labs:        Invalid input(s): [id number]      Recent Labs      17   0848  17   0906   SODIUM  141  138  138   POTASSIUM  5.0  4.2  4.6   CHLORIDE  106  103  104   CO2  28  29  29   BUN  82*  43*  55*   CREATININE  4.10*  2.59*  2.93*   CALCIUM  7.6*  7.5*  7.4*     Recent Labs      17   0848  17   0906   GLUCOSE  156*  123*  181*     Recent Labs      17   0515  17   0512  17   0518   RBC  3.37*  3.42*  3.41*   --    HEMOGLOBIN  10.5*  10.9*  10.2*   --    HEMATOCRIT  30.4*  32.6*  31.1*   --    PLATELETCT  85*  87*  80*   --    IRON  46  51   --   57   TOTIRONBC  141*  155*   --   151*     Recent Labs      17   0515  17   WBC  16.7*  16.9*  14.2*           Hemodynamics:  Temp (24hrs), Av.4 °C (97.5 °F), Min:35.8 °C (96.4 °F), Max:36.8 °C (98.2 °F)  Temperature: 36.8 °C (98.2 °F)  Pulse  Av.3  Min: 52  Max: 120Heart Rate (Monitored): 85  Blood Pressure : 138/59 mmHg, NIBP: 112/52 mmHg     Respiratory:    Respiration: 18, Pulse Oximetry: 92 %     Work Of Breathing / Effort: Shallow;Mild  RUL Breath Sounds: Diminished, RML Breath Sounds: Diminished, RLL Breath Sounds: Diminished, SOURAV Breath Sounds: Diminished, LLL Breath Sounds:  Diminished  Fluids:    Intake/Output Summary (Last 24 hours) at 01/21/17 1015  Last data filed at 01/21/17 0847   Gross per 24 hour   Intake   1897 ml   Output   2950 ml   Net  -1053 ml        GI/Nutrition:  Orders Placed This Encounter   Procedures   • DIET ORDER     Standing Status: Standing      Number of Occurrences: 1      Standing Expiration Date:      Order Specific Question:  Diet:     Answer:  Full Liquid [11]      Comments:  NO MEAL TRAYS, ONLY SNACKS     Order Specific Question:  Consistency/Fluid modifications:     Answer:  Nectar Thick [2]     Medical Decision Making, by Problem:  Active Hospital Problems    Diagnosis   • Glioblastoma (CMS-[facility]) [C71.9]   • Type 2 diabetes mellitus with stage 3 chronic kidney disease (CMS-[facility]) [E11.22, N18.3]   • HTN (hypertension) [I10]   • Hyponatremia [E87.1]   • Protein-calorie malnutrition, severe (CMS-[facility]) [E43]   • Thrombocytopenia (CMS-[facility]) [D69.6]   • Renal failure, acute on chronic (CMS-HCC) [N17.9, N18.9]   • Dyslipidemia [E78.5]       Core Measures    Assessment/Plan:  # Chronic kidney disease patient with new ESRD  -- iron Sat 38, ferrutin 235, not on ferrous. Hgb 11.    --  K: 5, HCO3: 26. On sodium bicarb. Corrected Ca: ~8.5, PO4:3.7, . Not on PO4 binder now.    -- s/p placement of dialysis catheter on 1/12/17    -- s/p HD on 1/13, 1/14, 1/17, 01/18, 1/20  -- S/p Perm-cath placement  -- Good UOP , holding HD for now    # Intracranial Mass  --Neurosurgery on 1/15/17  --Per NeuroSurgery's note, tapering of steroid started.    --Oncology Dr. Salomon also following    # HTN, systolic around 150.    --Continue home BP meds  --On amlodipine, hydralazine, metoprolol now.     # DM II--management per primary svc  # Metabolic Acidosis, with HCO3 26--continue PO bicarb supplements  # PLT stable at 80-90K. No thrombosis/bleeding found.

## 2017-01-21 NOTE — PROGRESS NOTES
Per dialysis RN, unable to draw BMP at this time due to patient currently receiving dialysis. Please wait until 2-4 hours after dialysis is completed before drawing labs.   updated at states she will draw lab around 2300.

## 2017-01-21 NOTE — PROGRESS NOTES
Hospital Medicine Interval Note  Date of Service:  1/20/2017    Chief Complaint  71 y.o.-year-old female admitted 1/7/2017 with GBM, sp resection on 1/15/17.    Interval Problem Update  Patient states she feels fine today, family at bedside.  Swallow eval to be done today and cog eval when available.  Supevised NTL snacks with 1:1 supervision to be started.  Drop Na rate to 5cc/hour and likely dc tomorrow.  Reviewed Dr Claudio's note - cleared for prophylactic heparin but will hold for now given platelets in 80s. [facility] oncology.    Consultants/Specialty  Oncology - [place]  NS - [place]    Disposition  tbd     Review of Systems   Constitutional: Negative for fever and chills.   HENT: Negative for nosebleeds.    Eyes: Negative for blurred vision, pain and discharge.   Respiratory: Negative for cough and shortness of breath.    Cardiovascular: Negative for chest pain, claudication and leg swelling.   Gastrointestinal: Negative for heartburn, abdominal pain and diarrhea.   Genitourinary: Negative for dysuria and frequency.   Musculoskeletal: Positive for myalgias. Negative for back pain and neck pain.   Skin: Negative for itching and rash.   Neurological: Positive for focal weakness. Negative for dizziness, seizures, weakness and headaches.   Psychiatric/Behavioral: Negative for depression. The patient is not nervous/anxious and does not have insomnia.       Physical Exam Laboratory/Imaging   Filed Vitals:    01/20/17 1506 01/20/17 1511 01/20/17 1518 01/20/17 1521   BP:       Pulse: 86 77 80 85   Temp:       Resp: 16 16     Height:       Weight:       SpO2: 99% 96% 96% 96%     Physical Exam   Constitutional: She is oriented to person, place, and time. She appears well-developed and well-nourished. No distress.   HENT:   Head: Normocephalic.   Staples on scalp, healing incision.   Eyes: Conjunctivae are normal. No scleral icterus.   Neck: Neck supple. No JVD present.   Cardiovascular: Normal rate, regular rhythm, normal  heart sounds and intact distal pulses.  Exam reveals no gallop and no friction rub.    No murmur heard.  Pulmonary/Chest: Effort normal and breath sounds normal. No respiratory distress. She has no wheezes. She exhibits no tenderness.   Abdominal: Soft. Bowel sounds are normal. There is no tenderness.   Musculoskeletal: She exhibits no edema or tenderness.   Neurological: She is alert and oriented to person, place, and time. No cranial nerve deficit.   Skin: Skin is warm and dry. She is not diaphoretic. No erythema.   Psychiatric: She has a normal mood and affect. Her behavior is normal.   Nursing note and vitals reviewed.   Lab Results   Component Value Date/Time    WBC 16.9* 01/20/2017 05:12 AM    HEMOGLOBIN 10.9* 01/20/2017 05:12 AM    HEMATOCRIT 32.6* 01/20/2017 05:12 AM    PLATELET COUNT 87* 01/20/2017 05:12 AM     Lab Results   Component Value Date/Time    SODIUM 141 01/20/2017 08:48 AM    POTASSIUM 5.0 01/20/2017 08:48 AM    CHLORIDE 106 01/20/2017 08:48 AM    CO2 28 01/20/2017 08:48 AM    GLUCOSE 156* 01/20/2017 08:48 AM    BUN 82* 01/20/2017 08:48 AM    CREATININE 4.10* 01/20/2017 08:48 AM      Assessment/Plan    Glioblastoma (CMS-HCC)  Assessment & Plan  Grade IV based on pathology  Edgar morales oncologist - Aime is primary  Dr Martinez consulted this am - mapping 2/6/17  Cannot have chemotherapy while on HD  Steroid taper per NSG      Type 2 diabetes mellitus with stage 3 chronic kidney disease (CMS-[facility])  Assessment & Plan  Lantus 20  SSi  Cortrak with diabetasource      Dyslipidemia  Assessment & Plan  zocor as home medication - okay to continue to hold      Protein-calorie malnutrition, severe (CMS-HCC)  Assessment & Plan  Continue cortrak for nutrition      Thrombocytopenia (CMS-[facility])  Assessment & Plan  Stable in 80s, continue to follow counts  Heparin okay per NSG but will continue to hold given low platelets.      Renal failure, acute on chronic (CMS-[facility])  Assessment & Plan  Currently on HD,  nephrology following  Tunneled cath placed today without complication.      Hyponatremia  Assessment & Plan  Na up to 141, can cut back on 3% change rate to 5cc/hour - likely dc tomorrow.      HTN (hypertension)  Assessment & Plan  Norvasc, metoprolol  PRN medications with fluctuating fluid status between HD, 3%NS       Labs reviewed, Medications reviewed and Radiology images reviewed  Hillman catheter: Monitoring for Diabetes Insipidus in Neurology Patients

## 2017-01-21 NOTE — PROGRESS NOTES
Hemodialysis treatment on 1/20/2017 initiated on 1545 pm and ended on 1846 pm and was ordered by Dr. Pierce. Please see paper flow sheets for details. Net UF: 2200 ml. Patient stable, tolerated treatment, AO x 3, lethargic/sleepy, afebrile, no shortness of breath noted, VSS, right IJ HD perm catheter intact, no s/s of infection noted, dressing - CDI, no oozing/ no bleeding/ no hematoma noted, patient w/o acute c/o or acute events during treatment and at this time. Report given to primary RN - Margaret Rodrigues RN.

## 2017-01-21 NOTE — CARE PLAN
Problem: Pain Management  Goal: Pain level will decrease to patient’s comfort goal  Outcome: PROGRESSING AS EXPECTED  Assess pain using 1-10 scale  Medicate PRN    Problem: Venous Thromboembolism (VTW)/Deep Vein Thrombosis (DVT) Prevention:  Goal: Patient will participate in Venous Thrombosis (VTE)/Deep Vein Thrombosis (DVT)Prevention Measures  Outcome: PROGRESSING AS EXPECTED  SCD's in place  Pt educated on VTE risk

## 2017-01-21 NOTE — PROGRESS NOTES
Progress Note               Author: Bry Ventura MD Date & Time created: 2017  10:11 AM     Interval History:  POD #7 crani and tumor resection  Path is GBM    Mental status at pre-op status, A/Ox2  LUE and LLE much stronger 4/5    Seen by oncology     Review of Systems:  ROS    Physical Exam:  Physical Exam   Neurological:   A/o x2 [place], mild left sided weakness, incision CDI       Labs:        Invalid input(s): [id number]      Recent Labs      17   0848  17   0906   SODIUM  141  138  138   POTASSIUM  5.0  4.2  4.6   CHLORIDE  106  103  104   CO2  28  29  29   BUN  82*  43*  55*   CREATININE  4.10*  2.59*  2.93*   CALCIUM  7.6*  7.5*  7.4*     Recent Labs      17   0848  17   0906   GLUCOSE  156*  123*  181*     Recent Labs      17   0515  17   0517   0518   RBC  3.37*  3.42*  3.41*   --    HEMOGLOBIN  10.5*  10.9*  10.2*   --    HEMATOCRIT  30.4*  32.6*  31.1*   --    PLATELETCT  85*  87*  80*   --    IRON  46  51   --   57   TOTIRONBC  141*  155*   --   151*     Recent Labs      17   WBC  16.7*  16.9*  14.2*     Hemodynamics:  Temp (24hrs), Av.4 °C (97.5 °F), Min:35.8 °C (96.4 °F), Max:36.8 °C (98.2 °F)  Temperature: 36.8 °C (98.2 °F)  Pulse  Av.3  Min: 52  Max: 120Heart Rate (Monitored): 85  Blood Pressure : 138/59 mmHg, NIBP: 112/52 mmHg     Respiratory:    Respiration: 18, Pulse Oximetry: 92 %     Work Of Breathing / Effort: Shallow;Mild  RUL Breath Sounds: Diminished, RML Breath Sounds: Diminished, RLL Breath Sounds: Diminished, SOURAV Breath Sounds: Diminished, LLL Breath Sounds: Diminished  Fluids:    Intake/Output Summary (Last 24 hours) at 17 0826  Last data filed at 17 0200   Gross per 24 hour   Intake   2030 ml   Output   3195 ml   Net  -1165 ml        GI/Nutrition:  Orders Placed This Encounter   Procedures   • DIET ORDER      Standing Status: Standing      Number of Occurrences: 1      Standing Expiration Date:      Order Specific Question:  Diet:     Answer:  Full Liquid [11]      Comments:  NO MEAL TRAYS, ONLY SNACKS     Order Specific Question:  Consistency/Fluid modifications:     Answer:  Nectar Thick [2]     Medical Decision Making, by Problem:  Active Hospital Problems    Diagnosis   • Glioblastoma (CMS-HCC) [C71.9]   • Type 2 diabetes mellitus with stage 3 chronic kidney disease (CMS-HCC) [E11.22, N18.3]   • HTN (hypertension) [I10]   • Protein-calorie malnutrition, severe (CMS-HCC) [E43]   • Thrombocytopenia (CMS-HCC) [D69.6]   • Renal failure, acute on chronic (CMS-HCC) [N17.9, N18.9]   • Dyslipidemia [E78.5]       Plan:  Patient improving well  Passed ST, progressing PO intake  Ok for heparin 5000u sq q8hrs, being held due to thrombocytopenia which I agree with  Taper steroids to 2mg q 8 hr today, plan 2q12 1/23    Labs reviewed, Radiology images reviewed and Medications reviewed

## 2017-01-21 NOTE — DISCHARGE PLANNING
Medical Social Work    Discussed in IDT rounds this morning at pt's bedside.  Pt will have catheter placed for dialysis today.  Pt will likely require SNF or Rehab placement prior to d/c home.     Plan:  SS will remain available to provide support and assist with d/c planning as needed.

## 2017-01-22 ENCOUNTER — APPOINTMENT (OUTPATIENT)
Dept: RADIOLOGY | Facility: MEDICAL CENTER | Age: 72
DRG: 025 | End: 2017-01-22
Attending: INTERNAL MEDICINE
Payer: MEDICARE

## 2017-01-22 LAB
ANION GAP SERPL CALC-SCNC: 5 MMOL/L (ref 0–11.9)
ANION GAP SERPL CALC-SCNC: 5 MMOL/L (ref 0–11.9)
ANION GAP SERPL CALC-SCNC: 9 MMOL/L (ref 0–11.9)
BUN SERPL-MCNC: 66 MG/DL (ref 8–22)
BUN SERPL-MCNC: 70 MG/DL (ref 8–22)
BUN SERPL-MCNC: 75 MG/DL (ref 8–22)
CALCIUM SERPL-MCNC: 7.1 MG/DL (ref 8.5–10.5)
CALCIUM SERPL-MCNC: 7.3 MG/DL (ref 8.5–10.5)
CALCIUM SERPL-MCNC: 7.4 MG/DL (ref 8.5–10.5)
CHLORIDE SERPL-SCNC: 102 MMOL/L (ref 96–112)
CHLORIDE SERPL-SCNC: 102 MMOL/L (ref 96–112)
CHLORIDE SERPL-SCNC: 103 MMOL/L (ref 96–112)
CO2 SERPL-SCNC: 25 MMOL/L (ref 20–33)
CO2 SERPL-SCNC: 25 MMOL/L (ref 20–33)
CO2 SERPL-SCNC: 28 MMOL/L (ref 20–33)
CREAT SERPL-MCNC: 3.4 MG/DL (ref 0.5–1.4)
CREAT SERPL-MCNC: 3.48 MG/DL (ref 0.5–1.4)
CREAT SERPL-MCNC: 3.54 MG/DL (ref 0.5–1.4)
ERYTHROCYTE [DISTWIDTH] IN BLOOD BY AUTOMATED COUNT: 48.4 FL (ref 35.9–50)
GFR SERPL CREATININE-BSD FRML MDRD: 13 ML/MIN/1.73 M 2
GLUCOSE BLD-MCNC: 118 MG/DL (ref 65–99)
GLUCOSE BLD-MCNC: 137 MG/DL (ref 65–99)
GLUCOSE BLD-MCNC: 154 MG/DL (ref 65–99)
GLUCOSE BLD-MCNC: 167 MG/DL (ref 65–99)
GLUCOSE BLD-MCNC: 94 MG/DL (ref 65–99)
GLUCOSE SERPL-MCNC: 108 MG/DL (ref 65–99)
GLUCOSE SERPL-MCNC: 167 MG/DL (ref 65–99)
GLUCOSE SERPL-MCNC: 169 MG/DL (ref 65–99)
HCT VFR BLD AUTO: 30.9 % (ref 37–47)
HGB BLD-MCNC: 10.2 G/DL (ref 12–16)
IRON SATN MFR SERPL: 18 % (ref 15–55)
IRON SERPL-MCNC: 27 UG/DL (ref 40–170)
MCH RBC QN AUTO: 30.3 PG (ref 27–33)
MCHC RBC AUTO-ENTMCNC: 33 G/DL (ref 33.6–35)
MCV RBC AUTO: 91.7 FL (ref 81.4–97.8)
PLATELET # BLD AUTO: 71 K/UL (ref 164–446)
PMV BLD AUTO: 11.6 FL (ref 9–12.9)
POTASSIUM SERPL-SCNC: 4 MMOL/L (ref 3.6–5.5)
POTASSIUM SERPL-SCNC: 4 MMOL/L (ref 3.6–5.5)
POTASSIUM SERPL-SCNC: 4.2 MMOL/L (ref 3.6–5.5)
RBC # BLD AUTO: 3.37 M/UL (ref 4.2–5.4)
SODIUM SERPL-SCNC: 132 MMOL/L (ref 135–145)
SODIUM SERPL-SCNC: 135 MMOL/L (ref 135–145)
SODIUM SERPL-SCNC: 137 MMOL/L (ref 135–145)
TIBC SERPL-MCNC: 153 UG/DL (ref 250–450)
WBC # BLD AUTO: 22.9 K/UL (ref 4.8–10.8)

## 2017-01-22 PROCEDURE — 83550 IRON BINDING TEST: CPT

## 2017-01-22 PROCEDURE — 700102 HCHG RX REV CODE 250 W/ 637 OVERRIDE(OP)

## 2017-01-22 PROCEDURE — 99233 SBSQ HOSP IP/OBS HIGH 50: CPT | Performed by: INTERNAL MEDICINE

## 2017-01-22 PROCEDURE — A9270 NON-COVERED ITEM OR SERVICE: HCPCS | Performed by: INTERNAL MEDICINE

## 2017-01-22 PROCEDURE — A9270 NON-COVERED ITEM OR SERVICE: HCPCS | Performed by: NURSE PRACTITIONER

## 2017-01-22 PROCEDURE — 83540 ASSAY OF IRON: CPT

## 2017-01-22 PROCEDURE — A9270 NON-COVERED ITEM OR SERVICE: HCPCS | Performed by: PHARMACIST

## 2017-01-22 PROCEDURE — 700102 HCHG RX REV CODE 250 W/ 637 OVERRIDE(OP): Performed by: HOSPITALIST

## 2017-01-22 PROCEDURE — 770009 HCHG ROOM/CARE - ONCOLOGY SEMI PRI*

## 2017-01-22 PROCEDURE — 700111 HCHG RX REV CODE 636 W/ 250 OVERRIDE (IP): Performed by: INTERNAL MEDICINE

## 2017-01-22 PROCEDURE — 36415 COLL VENOUS BLD VENIPUNCTURE: CPT

## 2017-01-22 PROCEDURE — 700102 HCHG RX REV CODE 250 W/ 637 OVERRIDE(OP): Performed by: PHARMACIST

## 2017-01-22 PROCEDURE — 700102 HCHG RX REV CODE 250 W/ 637 OVERRIDE(OP): Performed by: PHYSICIAN ASSISTANT

## 2017-01-22 PROCEDURE — 700102 HCHG RX REV CODE 250 W/ 637 OVERRIDE(OP): Performed by: NURSE PRACTITIONER

## 2017-01-22 PROCEDURE — A9270 NON-COVERED ITEM OR SERVICE: HCPCS

## 2017-01-22 PROCEDURE — A9270 NON-COVERED ITEM OR SERVICE: HCPCS | Performed by: PHYSICIAN ASSISTANT

## 2017-01-22 PROCEDURE — A9270 NON-COVERED ITEM OR SERVICE: HCPCS | Performed by: HOSPITALIST

## 2017-01-22 PROCEDURE — 80048 BASIC METABOLIC PNL TOTAL CA: CPT

## 2017-01-22 PROCEDURE — 700102 HCHG RX REV CODE 250 W/ 637 OVERRIDE(OP): Performed by: INTERNAL MEDICINE

## 2017-01-22 PROCEDURE — 85027 COMPLETE CBC AUTOMATED: CPT

## 2017-01-22 PROCEDURE — 82962 GLUCOSE BLOOD TEST: CPT

## 2017-01-22 RX ORDER — SODIUM CHLORIDE 9 MG/ML
500 INJECTION, SOLUTION INTRAVENOUS ONCE
Status: COMPLETED | OUTPATIENT
Start: 2017-01-22 | End: 2017-01-22

## 2017-01-22 RX ADMIN — HYDRALAZINE HYDROCHLORIDE 50 MG: 50 TABLET ORAL at 06:00

## 2017-01-22 RX ADMIN — DEXAMETHASONE 2 MG: 4 TABLET ORAL at 13:52

## 2017-01-22 RX ADMIN — SODIUM BICARBONATE TAB 650 MG 1300 MG: 650 TAB at 08:27

## 2017-01-22 RX ADMIN — DEXAMETHASONE 2 MG: 4 TABLET ORAL at 05:32

## 2017-01-22 RX ADMIN — MICONAZOLE NITRATE: 20 CREAM TOPICAL at 21:31

## 2017-01-22 RX ADMIN — HYDRALAZINE HYDROCHLORIDE 50 MG: 50 TABLET ORAL at 21:31

## 2017-01-22 RX ADMIN — LEVETIRACETAM 500 MG: 100 SOLUTION ORAL at 20:46

## 2017-01-22 RX ADMIN — SCOPALAMINE 1 PATCH: 1 PATCH, EXTENDED RELEASE TRANSDERMAL at 11:00

## 2017-01-22 RX ADMIN — INSULIN GLARGINE 20 UNITS: 100 INJECTION, SOLUTION SUBCUTANEOUS at 20:45

## 2017-01-22 RX ADMIN — SODIUM CHLORIDE 500 ML: 9 INJECTION, SOLUTION INTRAVENOUS at 11:52

## 2017-01-22 RX ADMIN — AMLODIPINE BESYLATE 10 MG: 10 TABLET ORAL at 08:27

## 2017-01-22 RX ADMIN — MICONAZOLE NITRATE: 20 CREAM TOPICAL at 08:27

## 2017-01-22 RX ADMIN — METOPROLOL TARTRATE 50 MG: 50 TABLET, FILM COATED ORAL at 08:27

## 2017-01-22 RX ADMIN — LEVETIRACETAM 500 MG: 100 SOLUTION ORAL at 08:27

## 2017-01-22 RX ADMIN — Medication 1 TABLET: at 20:46

## 2017-01-22 RX ADMIN — ALPRAZOLAM 0.25 MG: 0.25 TABLET ORAL at 21:31

## 2017-01-22 RX ADMIN — SIMVASTATIN 10 MG: 20 TABLET, FILM COATED ORAL at 20:46

## 2017-01-22 RX ADMIN — ACETAMINOPHEN 650 MG: 325 TABLET, FILM COATED ORAL at 13:52

## 2017-01-22 RX ADMIN — HYDRALAZINE HYDROCHLORIDE 50 MG: 50 TABLET ORAL at 13:52

## 2017-01-22 RX ADMIN — METOPROLOL TARTRATE 50 MG: 50 TABLET, FILM COATED ORAL at 20:46

## 2017-01-22 RX ADMIN — DEXAMETHASONE 2 MG: 4 TABLET ORAL at 20:46

## 2017-01-22 ASSESSMENT — ENCOUNTER SYMPTOMS
ABDOMINAL PAIN: 0
HEADACHES: 0
COUGH: 0
MEMORY LOSS: 1
CHILLS: 0
FEVER: 0
EYE PAIN: 0
NERVOUS/ANXIOUS: 0
FOCAL WEAKNESS: 1
HEARTBURN: 0
CLAUDICATION: 0
BLURRED VISION: 0
WEAKNESS: 0
BACK PAIN: 0
NECK PAIN: 0
DIZZINESS: 0
RESPIRATORY NEGATIVE: 1
DEPRESSION: 0
MUSCULOSKELETAL NEGATIVE: 1
CARDIOVASCULAR NEGATIVE: 1
INSOMNIA: 0
EYE DISCHARGE: 0
DIARRHEA: 0
MYALGIAS: 1
SEIZURES: 0
SHORTNESS OF BREATH: 0

## 2017-01-22 ASSESSMENT — PAIN SCALES - GENERAL
PAINLEVEL_OUTOF10: 0
PAINLEVEL_OUTOF10: 6
PAINLEVEL_OUTOF10: 0

## 2017-01-22 NOTE — PROGRESS NOTES
500cc bolus infused, patient UO is at 10 currently, will continue to monitor. Labs to be drawn as scheduled to monitor BUN and creatinine. Will notify Dr. Craven when labs return.

## 2017-01-22 NOTE — PROGRESS NOTES
Hospital Medicine Progress Note, Adult, Complex               Author: Zulma Coronado Date & Time created: 1/22/2017  10:08 AM     Interval History:  71 year old female with PMHx DM type 2, CKD who presented with headache and N/V and was found to have large right temporal intracranial mass on CT head ---> MRI brain reveals large right temporal lobe mass with extensive vasogenic edema most consistent with glioblastoma multiforme. There is R>L midline shift.  Consult to manage ANNABEL on CKD - patient has repeatedly said as outpatient - under no circumstances would she accept dialysis either temporarily or permanently.  HOWEVER - she has recanted that statement to me at this point.    DAILY NEPHROLOGY SUMMARY  1/08/17 - seen in consultation by Dr Chua.  ANNABEL felt likely due to pre-renal azotemia from poor intake w Nausea/headaches  1/09/17 - renal function not improved - no fluids given overnight - initiating x 1L  1/10/17 - Cr dropped from 5.17 to 5.09. K: 5.2. Nearly euvolemic. Better appetite. No indication to start HD now.    1/11/17 - MR done again on Khari 10. Nephro team double checked with Radiology office at [id number], the MR was done WITHOUT contrast. No HD today even her Cr is going up. No subjective uremic complaints. Near euvolemic status.    1/12/17 - HD catheter done at right IJ. No complication noted.    1/13/17 - Elevated Bun/Cr/K. No much subjective uremic symptoms. No symptoms from high K (5.7) 1st HD on Jan 13.    1/14/17 - s/p HD #1 yesterday without complication  Running again today in preparation for surgery tomorrow  1/15/17 - tolerated HD yesterday. NeuroSugery on Khari 15.    1/16/17 - Slow response to my interview. No much subjective complaints. Mild drowsiness noted. Bun/Cr from 1/15: 84/4.8 to 90/4.8. K at 5.3. No HD on 01/16.  1/17/17 - HD done smoothly in the morning for high Bun/Cr and low Na/high K. UF 2L. Low PLT at 85, trending down from 200K. No thrombosis or bleeding found.     1/18/17  - PLT stable at 89. Bun/Cr: 66/4. K4.7, Na 136. Will do another HD today to improve Na, reduce K/Bun/Cr.    17 -PLT stable at 85. Bun/Cr 57/3.3. Better consciousness level. Better left side muscle power. Will arrange Gao for her.    17- HD today as her schedule. Waiting for perm cath from IR team. Urine output 375 in past 24 hours.    17  -  S/p Hemodialysis treatment on 2017 initiated on ,Net UF: 2200 ml.   S/p Perm-cath placement, Good UOP , holding HD for now, apears on the dry side, no IV fluid and NPO, we will start gentle fluid hydration.   17 -  The patient is not responding to fluid challenge, Cr is trending up off HD, D/C IV fluid, HD in Am.      Review of Systems:  ROS    Physical Exam:  Physical Exam    Labs:        Invalid input(s): [id number]      Recent Labs      17   0011  17   0849   SODIUM  136  137  132*   POTASSIUM  4.2  4.0  4.0   CHLORIDE  100  103  102   CO2  28  25  25   BUN  65*  66*  70*   CREATININE  3.40*  3.48*  3.40*   CALCIUM  7.4*  7.4*  7.1*     Recent Labs      17   0011  17   0849   GLUCOSE  252*  169*  108*     Recent Labs      1718  17   0011  17   0549   RBC  3.42*  3.41*   --   3.37*   --    HEMOGLOBIN  10.9*  10.2*   --   10.2*   --    HEMATOCRIT  32.6*  31.1*   --   30.9*   --    PLATELETCT  87*  80*   --   71*   --    IRON  51   --   57   --   27*   TOTIRONBC  155*   --   151*   --   153*     Recent Labs      17   0011   WBC  16.9*  14.2*  22.9*           Hemodynamics:  Temp (24hrs), Av.6 °C (97.9 °F), Min:36.1 °C (97 °F), Max:37 °C (98.6 °F)  Temperature: 36.1 °C (97 °F)  Pulse  Av.7  Min: 52  Max: 120   Blood Pressure : 144/64 mmHg     Respiratory:    Respiration: 18, Pulse Oximetry: 96 %     Work Of Breathing / Effort: Shallow;Mild  RUL Breath Sounds: Diminished, RML Breath  Sounds: Diminished, RLL Breath Sounds: Diminished, SOURAV Breath Sounds: Diminished, LLL Breath Sounds: Diminished  Fluids:    Intake/Output Summary (Last 24 hours) at 01/22/17 1008  Last data filed at 01/22/17 0500   Gross per 24 hour   Intake   1483 ml   Output    300 ml   Net   1183 ml     Weight: 71.1 kg (156 lb 12 oz)  GI/Nutrition:  Orders Placed This Encounter   Procedures   • DIET ORDER     Standing Status: Standing      Number of Occurrences: 1      Standing Expiration Date:      Order Specific Question:  Diet:     Answer:  Full Liquid [11]      Comments:  NO MEAL TRAYS, ONLY SNACKS     Order Specific Question:  Consistency/Fluid modifications:     Answer:  Nectar Thick [2]     Medical Decision Making, by Problem:  Active Hospital Problems    Diagnosis   • Glioblastoma (CMS-[facility]) [C71.9]   • Type 2 diabetes mellitus with stage 3 chronic kidney disease (CMS-[facility]) [E11.22, N18.3]   • HTN (hypertension) [I10]   • Hyponatremia [E87.1]   • Protein-calorie malnutrition, severe (CMS-[facility]) [E43]   • Thrombocytopenia (CMS-[facility]) [D69.6]   • Renal failure, acute on chronic (CMS-[facility]) [N17.9, N18.9]   • Dyslipidemia [E78.5]     Assessment/Plan:  # Chronic kidney disease patient with new ESRD  -- iron Sat 38, ferrutin 235, not on ferrous. Hgb 11.    --  K: 5, HCO3: 26. On sodium bicarb. Corrected Ca: ~8.5, PO4:3.7, . Not on PO4 binder now.    -- s/p placement of dialysis catheter on 1/12/17    -- s/p HD on 1/13, 1/14, 1/17, 01/18, 1/20  -- S/p Perm-cath placement  --  The patient is not responding to fluid challenge, Cr is trending up off HD, D/C IV fluid, HD in Am.    # Intracranial Mass  --Neurosurgery on 1/15/17  --Per NeuroSurgery's note, tapering of steroid started.    --Oncology Dr. Salomon also following    # HTN, systolic around 150.    --Continue home BP meds  --On amlodipine, hydralazine, metoprolol now.     # DM II--management per primary svc  # Metabolic Acidosis, with HCO3 26--continue PO bicarb supplements  #  PLT stable at 80-90K. No thrombosis/bleeding found.     Thank you,      Core Measures

## 2017-01-22 NOTE — CARE PLAN
Problem: Safety  Goal: Will remain free from injury  Outcome: PROGRESSING AS EXPECTED  Pt attempting to get out of bed  Orientation and education provided    Problem: Bowel/Gastric:  Goal: Normal bowel function is maintained or improved  Outcome: PROGRESSING SLOWER THAN EXPECTED  Bowel sounds normoactive x4  Pt very constipated- soap enema administered along with suppository.

## 2017-01-22 NOTE — PROGRESS NOTES
Hospital Medicine Interval Note  Date of Service:  1/21/2017    Chief Complaint  71 y.o.-year-old female admitted 1/7/2017 with GBM, sp resection on 1/15/17.    Interval Problem Update  Patient states has no complaints, she is happy she gets oral snacks to start improving her swallowing capacity.  PIV came out today after 3% discontinued and PIV hasn't been able to be replaced.  US guided attempt pending.  Nephrology has okayed use of HD catheter and RN rightfully made sure NAM was aware as well as me.  The best thing for the patient at this time is to give IVF through HD catheter and re-assess tomorrow.  If another line is not able to be placed peripherally and patient still needs IVF - I will have to place a central line tomorrow.  PICC is not appropriate in this patient.    Consultants/Specialty  Oncology - [place]  [facility] [place] [facility]  Nephrology - [facility]    Disposition  tbd     Review of Systems   Constitutional: Negative for fever and chills.   HENT: Negative for nosebleeds.    Eyes: Negative for blurred vision, pain and discharge.   Respiratory: Negative for cough and shortness of breath.    Cardiovascular: Negative for chest pain, claudication and leg swelling.   Gastrointestinal: Negative for heartburn, abdominal pain and diarrhea.   Genitourinary: Negative for dysuria and frequency.   Musculoskeletal: Positive for myalgias. Negative for back pain and neck pain.   Skin: Negative for itching and rash.   Neurological: Positive for focal weakness. Negative for dizziness, seizures, weakness and headaches.   Psychiatric/Behavioral: Negative for depression. The patient is not nervous/anxious and does not have insomnia.       Physical Exam Laboratory/Imaging   Filed Vitals:    01/21/17 0400 01/21/17 1100 01/21/17 1514 01/21/17 1600   BP: 138/59 129/53 136/58 129/61   Pulse: 72 70 88 81   Temp: 36.8 °C (98.2 °F) 37 °C (98.6 °F) 36.7 °C (98.1 °F) 36.9 °C (98.4 °F)   Resp: 18 18 18 16   Height:       Weight:  71.1 kg (156 lb  12 oz)     SpO2: 92% 93%  93%     Physical Exam   Constitutional: She is oriented to person, place, and time. She appears well-developed and well-nourished. No distress.   HENT:   Head: Normocephalic.   Staples on scalp, healing incision.   Eyes: Conjunctivae are normal. No scleral icterus.   Neck: Neck supple. No JVD present.   Cardiovascular: Normal rate, regular rhythm, normal heart sounds and intact distal pulses.  Exam reveals no gallop and no friction rub.    No murmur heard.  Pulmonary/Chest: Effort normal and breath sounds normal. No respiratory distress. She has no wheezes. She exhibits no tenderness.   Abdominal: Soft. Bowel sounds are normal. There is no tenderness.   Musculoskeletal: She exhibits no edema or tenderness.   Neurological: She is alert and oriented to person, place, and time. No cranial nerve deficit.   Skin: Skin is warm and dry. She is not diaphoretic. No erythema.   Psychiatric: She has a normal mood and affect. Her behavior is normal.   Nursing note and vitals reviewed.   Lab Results   Component Value Date/Time    WBC 14.2* 01/20/2017 11:22 PM    HEMOGLOBIN 10.2* 01/20/2017 11:22 PM    HEMATOCRIT 31.1* 01/20/2017 11:22 PM    PLATELET COUNT 80* 01/20/2017 11:22 PM     Lab Results   Component Value Date/Time    SODIUM 136 01/21/2017 05:18 PM    POTASSIUM 4.2 01/21/2017 05:18 PM    CHLORIDE 100 01/21/2017 05:18 PM    CO2 28 01/21/2017 05:18 PM    GLUCOSE 252* 01/21/2017 05:18 PM    BUN 65* 01/21/2017 05:18 PM    CREATININE 3.40* 01/21/2017 05:18 PM      Assessment/Plan    Glioblastoma (CMS-HCC)  Assessment & Plan  Grade IV based on pathology  Edgar morales oncologist - Aime is primary  Dr Martinez consulted this am - mapping 2/6/17  Cannot have chemotherapy while on HD  Steroid taper per NSG      Type 2 diabetes mellitus with stage 3 chronic kidney disease (CMS-HCC)  Assessment & Plan  Lantus 20  SSi  Cortrak with diabetasource      Dyslipidemia  Assessment & Plan  zocor as home  medication - okay to continue to hold      Protein-calorie malnutrition, severe (CMS-HCC)  Assessment & Plan  Continue cortrak for nutrition      Thrombocytopenia (CMS-[facility])  Assessment & Plan  Stable in 80s, continue to follow counts  Heparin okay per NSG but will continue to hold given low platelets.      Renal failure, acute on chronic (CMS-HCC)  Assessment & Plan  Currently on HD, nephrology following  Tunneled cath placed 1/20 without complication.  Patient currently without PIV access, nephrology recommended use of HD cath for IVF admin      Hyponatremia  Assessment & Plan  Na 138, stop 3%      HTN (hypertension)  Assessment & Plan  Norvasc, metoprolol  PRN medications with fluctuating fluid status between HD, 3%NS       Labs reviewed, Medications reviewed and Radiology images reviewed  Hillman catheter: Monitoring for Diabetes Insipidus in Neurology Patients

## 2017-01-22 NOTE — CARE PLAN
Problem: Venous Thromboembolism (VTW)/Deep Vein Thrombosis (DVT) Prevention:  Goal: Patient will participate in Venous Thrombosis (VTE)/Deep Vein Thrombosis (DVT)Prevention Measures  Intervention: Ensure patient wears graduated elastic stockings (ADALGISA hose) and/or SCDs, if ordered, when in bed or chair (Remove at least once per shift for skin check)  Patient is wearing scd's.

## 2017-01-22 NOTE — PROGRESS NOTES
Patient's peripheral IV infiltrated after 3% NS drip d/c.  ICU RN up to bedside, unable to place US PIV.  PICC RN to bedside, also unable to place US PIV.  Able to find veins but unable to advance catheter.  Notified Dr. Jackson.

## 2017-01-22 NOTE — CARE PLAN
Problem: Bowel/Gastric:  Goal: Normal bowel function is maintained or improved  Outcome: PROGRESSING AS EXPECTED  Patient has been given bowel protocol, patient had small bowel movement last night.     Problem: Fluid Volume:  Goal: Will maintain balanced intake and output  Outcome: PROGRESSING AS EXPECTED  Fluids D/c per doctor order, IV line remain connected.

## 2017-01-22 NOTE — PROGRESS NOTES
Dr. Coronado to bedside, ordered IV fluids as she stated patient appears dry but is making urine and she wants to encourage renal function.  As previously noted, patient w/o IV access, only has 2 lumen HD catheter w/o pigtail.  MD requested to infuse IV fluids ONLY via red port of dialysis catheter.  This RN discussed with charge RN, EVANGELIST, Dr. Jackson.  All in agreement that with nephrologist ok, best practice for patient is to provide infusion via HD catheter.  Dr. Jackson stated she will re-assess vascular access options for patient in AM.  Dr. Coronado stated she will round in AM to heparin lock catheter.

## 2017-01-22 NOTE — PROGRESS NOTES
Oncology/Hematology Progress Note               Author: HAVEN Hernández Date & Time created: 1/22/2017  8:27 AM     Interval History:  CC: Glioblastoma          Thrombocytopenia          Renal failure  More alert and awake  Can move left side better  Dialysis Mon-Wed- Friday  Plts 71k; stopped heparin; tapering Decadron    Review of Systems:  Review of Systems   Constitutional: Negative for fever and chills.   Respiratory: Negative.    Cardiovascular: Negative.    Gastrointestinal: Negative for abdominal pain.   Genitourinary: Negative.    Musculoskeletal: Negative.    Neurological: Positive for focal weakness. Negative for headaches.   Psychiatric/Behavioral: Positive for memory loss.       Physical Exam:  Physical Exam   Constitutional: No distress.   HENT:   Head: Normocephalic.   Mouth/Throat: No oropharyngeal exudate.   Tube feeding   Neck: Normal range of motion.   Dialysis cath   Cardiovascular: Normal rate.    Pulmonary/Chest: Effort normal and breath sounds normal.   Abdominal: Soft.   Lymphadenopathy:     She has no cervical adenopathy.   Neurological:   Confused  Can move left side better   Skin: Skin is warm.       Labs:        Invalid input(s): [id number]      Recent Labs      01/21/17   0906  01/21/17 1718 01/22/17   0011   SODIUM  138  136  137   POTASSIUM  4.6  4.2  4.0   CHLORIDE  104  100  103   CO2  29  28  25   BUN  55*  65*  66*   CREATININE  2.93*  3.40*  3.48*   CALCIUM  7.4*  7.4*  7.4*     Recent Labs      01/21/17   0906  01/21/17   1718  01/22/17   0011   GLUCOSE  181*  252*  169*     Recent Labs      01/20/17 0512 01/20/17 2322 01/21/17 0518  01/22/17   0011  01/22/17   0549   RBC  3.42*  3.41*   --   3.37*   --    HEMOGLOBIN  10.9*  10.2*   --   10.2*   --    HEMATOCRIT  32.6*  31.1*   --   30.9*   --    PLATELETCT  87*  80*   --   71*   --    IRON  51   --   57   --   27*   TOTIRONBC  155*   --   151*   --   153*     Recent Labs      01/20/17 0512 01/20/17 2322   17   0011   WBC  16.9*  14.2*  22.9*     Recent Labs      17   0906  17   1718  17   0011   SODIUM  138  136  137   POTASSIUM  4.6  4.2  4.0   CHLORIDE  104  100  103   CO2  29  28  25   GLUCOSE  181*  252*  169*   BUN  55*  65*  66*   CREATININE  2.93*  3.40*  3.48*   CALCIUM  7.4*  7.4*  7.4*     Hemodynamics:  Temp (24hrs), Av.6 °C (97.9 °F), Min:36.1 °C (97 °F), Max:37 °C (98.6 °F)  Temperature: 36.1 °C (97 °F)  Pulse  Av.7  Min: 52  Max: 120   Blood Pressure : 144/64 mmHg     Respiratory:    Respiration: 18, Pulse Oximetry: 96 %     Work Of Breathing / Effort: Shallow;Mild  RUL Breath Sounds: Diminished, RML Breath Sounds: Diminished, RLL Breath Sounds: Diminished, SOURAV Breath Sounds: Diminished, LLL Breath Sounds: Diminished  Fluids:    Intake/Output Summary (Last 24 hours) at 17 0827  Last data filed at 17 0500   Gross per 24 hour   Intake   2880 ml   Output    300 ml   Net   2580 ml     Weight: 71.1 kg (156 lb 12 oz)  GI/Nutrition:  Orders Placed This Encounter   Procedures   • DIET ORDER     Standing Status: Standing      Number of Occurrences: 1      Standing Expiration Date:      Order Specific Question:  Diet:     Answer:  Full Liquid [11]      Comments:  NO MEAL TRAYS, ONLY SNACKS     Order Specific Question:  Consistency/Fluid modifications:     Answer:  Nectar Thick [2]     Medical Decision Making, by Problem:  Active Hospital Problems    Diagnosis   • Glioblastoma (CMS-HCC) [C71.9]   • Type 2 diabetes mellitus with stage 3 chronic kidney disease (CMS-HCC) [E11.22, N18.3]   • HTN (hypertension) [I10]   • Hyponatremia [E87.1]   • Protein-calorie malnutrition, severe (CMS-HCC) [E43]   • Thrombocytopenia (CMS-HCC) [D69.6]   • Renal failure, acute on chronic (CMS-HCC) [N17.9, N18.9]   • Dyslipidemia [E78.5]       Plan:  Need to follow plts; not sure about etiology of drop  Currently off heparin  Probable ECF/Rehab; make sure [facility] knows where patient is for  early Feb XRT  Not sure about [facility] with renal failure    Labs reviewed

## 2017-01-22 NOTE — PROGRESS NOTES
Patient AOx4 but forgetful at times.  VSS.  Denies pain.  Crani incision R scalp with staples, approximated, nehemiah.  Cortrak in place, TF at goal, tolerating well.  Last BM 1/18, bowel protocol implemented, no BM this shift.  Hillman to dd, scant clear yellow urine.  Excoriation and redness on labia, perineum, coccyx; katheryn paste applied.  Up 3 person max assist to commode x1, patient was unable to stand on her own.  HD catheter R chest, scant bloody drainage, resolved with pressure applied, Dr. Jackson aware.  Bed alarm on.  Family members at bedside.  Hourly rounding in place.

## 2017-01-22 NOTE — PROGRESS NOTES
Patient c/o lower back pain. Tylenol on MAR per active order not appropriate at this time. Discuss with Dr. Jackson she said it was okay to give tylenol at this time.

## 2017-01-22 NOTE — PROGRESS NOTES
Received report from NOC RN, assumed care of patient at 0700. Patient is awake alert and oriented x 3. She is sleepy because she didn't get much rest last night. Patient has a cortrak in place running tube feed. Patient can have three nectar thick snacks a day. Patient has a hawkins in place. Per active order fluids are infusing into HD catheter. Patient had small bowel movement last night per report, nurse had to digitally disimpact patient and remove stool. Call light within reach, bed in low and locked position. Family at bedside, questions answered, plan of care discussed.

## 2017-01-22 NOTE — CARE PLAN
Problem: Safety  Goal: Will remain free from falls  Intervention: Implement fall precautions  Bed alarm is on. Bed controls are locked. Patients bed is in the lowest position. Tread socks have been placed on the patient. Patient and family educated to call for assistance. Call light within reach.

## 2017-01-23 LAB
ANION GAP SERPL CALC-SCNC: 3 MMOL/L (ref 0–11.9)
BUN SERPL-MCNC: 41 MG/DL (ref 8–22)
CALCIUM SERPL-MCNC: 7.1 MG/DL (ref 8.5–10.5)
CHLORIDE SERPL-SCNC: 103 MMOL/L (ref 96–112)
CO2 SERPL-SCNC: 29 MMOL/L (ref 20–33)
CREAT SERPL-MCNC: 1.96 MG/DL (ref 0.5–1.4)
CRP SERPL HS-MCNC: 2.08 MG/DL (ref 0–0.75)
ERYTHROCYTE [DISTWIDTH] IN BLOOD BY AUTOMATED COUNT: 48.6 FL (ref 35.9–50)
GFR SERPL CREATININE-BSD FRML MDRD: 25 ML/MIN/1.73 M 2
GLUCOSE BLD-MCNC: 143 MG/DL (ref 65–99)
GLUCOSE BLD-MCNC: 148 MG/DL (ref 65–99)
GLUCOSE BLD-MCNC: 151 MG/DL (ref 65–99)
GLUCOSE BLD-MCNC: 162 MG/DL (ref 65–99)
GLUCOSE BLD-MCNC: 167 MG/DL (ref 65–99)
GLUCOSE SERPL-MCNC: 160 MG/DL (ref 65–99)
HCT VFR BLD AUTO: 26.7 % (ref 37–47)
HEPIND PLTAB  51052: NEGATIVE
HGB BLD-MCNC: 8.8 G/DL (ref 12–16)
IRON SATN MFR SERPL: 18 % (ref 15–55)
IRON SERPL-MCNC: 26 UG/DL (ref 40–170)
MCH RBC QN AUTO: 30.1 PG (ref 27–33)
MCHC RBC AUTO-ENTMCNC: 33 G/DL (ref 33.6–35)
MCV RBC AUTO: 91.4 FL (ref 81.4–97.8)
PLATELET # BLD AUTO: 57 K/UL (ref 164–446)
PMV BLD AUTO: 12.3 FL (ref 9–12.9)
POTASSIUM SERPL-SCNC: 4 MMOL/L (ref 3.6–5.5)
PREALB SERPL-MCNC: 18 MG/DL (ref 18–38)
RBC # BLD AUTO: 2.92 M/UL (ref 4.2–5.4)
SODIUM SERPL-SCNC: 135 MMOL/L (ref 135–145)
TIBC SERPL-MCNC: 146 UG/DL (ref 250–450)
WBC # BLD AUTO: 18.1 K/UL (ref 4.8–10.8)

## 2017-01-23 PROCEDURE — A9270 NON-COVERED ITEM OR SERVICE: HCPCS

## 2017-01-23 PROCEDURE — A9270 NON-COVERED ITEM OR SERVICE: HCPCS | Performed by: HOSPITALIST

## 2017-01-23 PROCEDURE — 36415 COLL VENOUS BLD VENIPUNCTURE: CPT

## 2017-01-23 PROCEDURE — 700102 HCHG RX REV CODE 250 W/ 637 OVERRIDE(OP): Performed by: HOSPITALIST

## 2017-01-23 PROCEDURE — A9270 NON-COVERED ITEM OR SERVICE: HCPCS | Performed by: PHARMACIST

## 2017-01-23 PROCEDURE — 80048 BASIC METABOLIC PNL TOTAL CA: CPT

## 2017-01-23 PROCEDURE — 99232 SBSQ HOSP IP/OBS MODERATE 35: CPT | Performed by: INTERNAL MEDICINE

## 2017-01-23 PROCEDURE — 700102 HCHG RX REV CODE 250 W/ 637 OVERRIDE(OP)

## 2017-01-23 PROCEDURE — 83550 IRON BINDING TEST: CPT

## 2017-01-23 PROCEDURE — 82962 GLUCOSE BLOOD TEST: CPT | Mod: 91

## 2017-01-23 PROCEDURE — 700102 HCHG RX REV CODE 250 W/ 637 OVERRIDE(OP): Performed by: NURSE PRACTITIONER

## 2017-01-23 PROCEDURE — A9270 NON-COVERED ITEM OR SERVICE: HCPCS | Performed by: NURSE PRACTITIONER

## 2017-01-23 PROCEDURE — 90935 HEMODIALYSIS ONE EVALUATION: CPT

## 2017-01-23 PROCEDURE — A9270 NON-COVERED ITEM OR SERVICE: HCPCS | Performed by: INTERNAL MEDICINE

## 2017-01-23 PROCEDURE — 85027 COMPLETE CBC AUTOMATED: CPT

## 2017-01-23 PROCEDURE — 700102 HCHG RX REV CODE 250 W/ 637 OVERRIDE(OP): Performed by: INTERNAL MEDICINE

## 2017-01-23 PROCEDURE — 83540 ASSAY OF IRON: CPT

## 2017-01-23 PROCEDURE — 700102 HCHG RX REV CODE 250 W/ 637 OVERRIDE(OP): Performed by: PHARMACIST

## 2017-01-23 PROCEDURE — 700111 HCHG RX REV CODE 636 W/ 250 OVERRIDE (IP)

## 2017-01-23 PROCEDURE — A9270 NON-COVERED ITEM OR SERVICE: HCPCS | Performed by: PHYSICIAN ASSISTANT

## 2017-01-23 PROCEDURE — 86022 PLATELET ANTIBODIES: CPT

## 2017-01-23 PROCEDURE — 700102 HCHG RX REV CODE 250 W/ 637 OVERRIDE(OP): Performed by: PHYSICIAN ASSISTANT

## 2017-01-23 PROCEDURE — 86140 C-REACTIVE PROTEIN: CPT

## 2017-01-23 PROCEDURE — 770009 HCHG ROOM/CARE - ONCOLOGY SEMI PRI*

## 2017-01-23 PROCEDURE — 84134 ASSAY OF PREALBUMIN: CPT

## 2017-01-23 RX ORDER — HEPARIN SODIUM 1000 [USP'U]/ML
INJECTION, SOLUTION INTRAVENOUS; SUBCUTANEOUS
Status: COMPLETED
Start: 2017-01-23 | End: 2017-01-23

## 2017-01-23 RX ORDER — HEPARIN SODIUM 1000 [USP'U]/ML
3700 INJECTION, SOLUTION INTRAVENOUS; SUBCUTANEOUS
Status: DISCONTINUED | OUTPATIENT
Start: 2017-01-23 | End: 2017-01-23

## 2017-01-23 RX ORDER — DEXAMETHASONE 4 MG/1
2 TABLET ORAL EVERY 12 HOURS
Status: DISPENSED | OUTPATIENT
Start: 2017-01-23 | End: 2017-01-25

## 2017-01-23 RX ORDER — DEXAMETHASONE 1 MG
1 TABLET ORAL 2 TIMES DAILY
Status: COMPLETED | OUTPATIENT
Start: 2017-01-25 | End: 2017-01-26

## 2017-01-23 RX ADMIN — Medication 1 TABLET: at 20:24

## 2017-01-23 RX ADMIN — METOPROLOL TARTRATE 50 MG: 50 TABLET, FILM COATED ORAL at 11:49

## 2017-01-23 RX ADMIN — AMLODIPINE BESYLATE 10 MG: 10 TABLET ORAL at 09:00

## 2017-01-23 RX ADMIN — HYDRALAZINE HYDROCHLORIDE 50 MG: 50 TABLET ORAL at 05:14

## 2017-01-23 RX ADMIN — SIMVASTATIN 10 MG: 20 TABLET, FILM COATED ORAL at 20:23

## 2017-01-23 RX ADMIN — MICONAZOLE NITRATE: 20 CREAM TOPICAL at 11:50

## 2017-01-23 RX ADMIN — HYDRALAZINE HYDROCHLORIDE 50 MG: 50 TABLET ORAL at 20:23

## 2017-01-23 RX ADMIN — HEPARIN SODIUM 3700 UNITS: 1000 INJECTION, SOLUTION INTRAVENOUS; SUBCUTANEOUS at 11:01

## 2017-01-23 RX ADMIN — INSULIN GLARGINE 20 UNITS: 100 INJECTION, SOLUTION SUBCUTANEOUS at 20:23

## 2017-01-23 RX ADMIN — DEXAMETHASONE 2 MG: 4 TABLET ORAL at 05:14

## 2017-01-23 RX ADMIN — DEXAMETHASONE 2 MG: 4 TABLET ORAL at 20:24

## 2017-01-23 RX ADMIN — METOPROLOL TARTRATE 50 MG: 50 TABLET, FILM COATED ORAL at 20:23

## 2017-01-23 RX ADMIN — LEVETIRACETAM 500 MG: 100 SOLUTION ORAL at 11:49

## 2017-01-23 RX ADMIN — HYDRALAZINE HYDROCHLORIDE 50 MG: 50 TABLET ORAL at 13:48

## 2017-01-23 RX ADMIN — LEVETIRACETAM 500 MG: 100 SOLUTION ORAL at 20:24

## 2017-01-23 RX ADMIN — MICONAZOLE NITRATE: 20 CREAM TOPICAL at 21:00

## 2017-01-23 RX ADMIN — ALPRAZOLAM 0.25 MG: 0.25 TABLET ORAL at 22:03

## 2017-01-23 RX ADMIN — SODIUM BICARBONATE TAB 650 MG 1300 MG: 650 TAB at 11:49

## 2017-01-23 ASSESSMENT — ENCOUNTER SYMPTOMS
MYALGIAS: 1
FEVER: 0
HEARTBURN: 0
SHORTNESS OF BREATH: 0
ABDOMINAL PAIN: 0
WEAKNESS: 0
EYE PAIN: 0
BRUISES/BLEEDS EASILY: 1
DEPRESSION: 0
CONSTITUTIONAL NEGATIVE: 1
HEADACHES: 0
EYE DISCHARGE: 0
NECK PAIN: 0
RESPIRATORY NEGATIVE: 1
MUSCULOSKELETAL NEGATIVE: 1
CARDIOVASCULAR NEGATIVE: 1
DIARRHEA: 0
INSOMNIA: 0
BLURRED VISION: 0
FOCAL WEAKNESS: 1
COUGH: 0
CHILLS: 0
EYES NEGATIVE: 1
SEIZURES: 0
CLAUDICATION: 0
GASTROINTESTINAL NEGATIVE: 1
BACK PAIN: 0
NERVOUS/ANXIOUS: 0
DIZZINESS: 0

## 2017-01-23 ASSESSMENT — PAIN SCALES - GENERAL
PAINLEVEL_OUTOF10: 0
PAINLEVEL_OUTOF10: 0

## 2017-01-23 NOTE — PROGRESS NOTES
3hr HD started @ 0757 and completed @ 1058,tx well tolerated, VSS,net UF = 1000ml.RIDOLORES TD cressing CDI,report given to Ledy Stout RN.

## 2017-01-23 NOTE — PROGRESS NOTES
Per Cliff- patient to be disconnected from fluid and the line is to be heparin flushed. Contacted dialysis per doctor request, they are going to come de-access and heparin flush the line. Patient not to get anymore fluid and will receive dialysis tomorrow.

## 2017-01-23 NOTE — PROGRESS NOTES
Hospital Medicine Progress Note, Adult, Complex               Author: Michael Date & Time created: 1/23/2017  8:02 AM     Interval History:  71 year old female with PMHx DM type 2, CKD who presented with headache and N/V and was found to have large right temporal intracranial mass on CT head ---> MRI brain reveals large right temporal lobe mass with extensive vasogenic edema most consistent with glioblastoma multiforme. There is R>L midline shift.  Consult to manage ANNABEL on CKD - patient has repeatedly said as outpatient - under no circumstances would she accept dialysis either temporarily or permanently.  HOWEVER - she has recanted that statement to me at this point.    DAILY NEPHROLOGY SUMMARY  1/08/17 - seen in consultation by Dr Chua.  ANNABEL felt likely due to pre-renal azotemia from poor intake w Nausea/headaches  1/09/17 - renal function not improved - no fluids given overnight - initiating x 1L  1/10/17 - Cr dropped from 5.17 to 5.09. K: 5.2. Nearly euvolemic. Better appetite. No indication to start HD now.    1/11/17 - MR done again on Khari 10. Nephro team double checked with Radiology office at 4492, the MR was done WITHOUT contrast. No HD today even her Cr is going up. No subjective uremic complaints. Near euvolemic status.    1/12/17 - HD catheter done at right IJ. No complication noted.    1/13/17 - Elevated Bun/Cr/K. No much subjective uremic symptoms. No symptoms from high K (5.7) 1st HD on Jan 13.    1/14/17 - s/p HD #1 yesterday without complication  Running again today in preparation for surgery tomorrow  1/15/17 - tolerated HD yesterday. NeuroSugery on Khari 15.    1/16/17 - Slow response to my interview. No much subjective complaints. Mild drowsiness noted. Bun/Cr from 1/15: 84/4.8 to 90/4.8. K at 5.3. No HD on 01/16.  1/17/17 - HD done smoothly in the morning for high Bun/Cr and low Na/high K. UF 2L. Low PLT at 85, trending down from 200K. No thrombosis or bleeding found.     1/18/17 - PLT stable  at 89. Bun/Cr: 66/4. K4.7, Na 136. Will do another HD today to improve Na, reduce K/Bun/Cr.    1/19/17 -PLT stable at 85. Bun/Cr 57/3.3. Better consciousness level. Better left side muscle power. Will arrange Gao for her.    1/20/17- HD today as her schedule. Waiting for perm cath from IR team. Urine output 375 in past 24 hours.    1/21/17  -  S/p Hemodialysis treatment on 1/20/2017 initiated on ,Net UF: 2200 ml.   S/p Perm-cath placement, Good UOP , holding HD for now, apears on the dry side, no IV fluid and NPO, we will start gentle fluid hydration.   1/22/17 -  The patient is not responding to fluid challenge, Cr is trending up off HD, D/C IV fluid, HD in Am.  1/23/17 - pt had decreased urine output overnight      Review of Systems:  Review of Systems   Constitutional: Negative.    HENT: Negative.    Eyes: Negative.    Respiratory: Negative.    Cardiovascular: Negative.    Gastrointestinal: Negative.    Genitourinary:        Decreasing urine output   Musculoskeletal: Negative.    Skin: Negative.    Endo/Heme/Allergies: Bruises/bleeds easily.   Psychiatric/Behavioral:        Depressed affect       Physical Exam:  Physical Exam   Constitutional: She is oriented to person, place, and time. She appears well-developed and well-nourished.   HENT:   Head: Normocephalic and atraumatic.   Neck:   RIJ tunnel cath   Cardiovascular: Normal rate and regular rhythm.    Pulmonary/Chest: She has wheezes. She has rales.   Abdominal: Soft. Bowel sounds are normal.   Musculoskeletal: She exhibits no edema.   Neurological: She is alert and oriented to person, place, and time.   Skin: Skin is warm.   Nursing note and vitals reviewed.      Labs:        Invalid input(s): [id number]      Recent Labs      01/22/17   0011  01/22/17   0849  01/22/17   1700   SODIUM  137  132*  135   POTASSIUM  4.0  4.0  4.2   CHLORIDE  103  102  102   CO2  25  25  28   BUN  66*  70*  75*   CREATININE  3.48*  3.40*  3.54*   CALCIUM  7.4*  7.1*  7.3*      Recent Labs      17   0011  17   0849  17   1700  17   0322   PREALBUMIN   --    --    --   18.0   GLUCOSE  169*  108*  167*   --      Recent Labs      17   2322  17   0518  17   0011  17   0549  17   0322   RBC  3.41*   --   3.37*   --   2.92*   HEMOGLOBIN  10.2*   --   10.2*   --   8.8*   HEMATOCRIT  31.1*   --   30.9*   --   26.7*   PLATELETCT  80*   --   71*   --   57*   IRON   --   57   --   27*  26*   TOTIRONBC   --   151*   --   153*  146*     Recent Labs      172  17   0011  17   0322   WBC  14.2*  22.9*  18.1*           Hemodynamics:  Temp (24hrs), Av.6 °C (97.9 °F), Min:36.4 °C (97.5 °F), Max:36.7 °C (98.1 °F)  Temperature: 36.7 °C (98.1 °F)  Pulse  Av.9  Min: 52  Max: 120   Blood Pressure : 141/63 mmHg     Respiratory:    Respiration: 18, Pulse Oximetry: 93 %     Work Of Breathing / Effort: Shallow;Mild  RUL Breath Sounds: Diminished, RML Breath Sounds: Diminished, RLL Breath Sounds: Diminished, SOURAV Breath Sounds: Diminished, LLL Breath Sounds: Diminished  Fluids:    Intake/Output Summary (Last 24 hours) at 17 0802  Last data filed at 17 0500   Gross per 24 hour   Intake    330 ml   Output    320 ml   Net     10 ml        GI/Nutrition:  Orders Placed This Encounter   Procedures   • DIET ORDER     Standing Status: Standing      Number of Occurrences: 1      Standing Expiration Date:      Order Specific Question:  Diet:     Answer:  Full Liquid [11]      Comments:  NO MEAL TRAYS, ONLY SNACKS     Order Specific Question:  Consistency/Fluid modifications:     Answer:  Nectar Thick [2]     Medical Decision Making, by Problem:  Active Hospital Problems    Diagnosis   • Glioblastoma (CMS-HCC) [C71.9]   • Type 2 diabetes mellitus with stage 3 chronic kidney disease (CMS-HCC) [E11.22, N18.3]   • HTN (hypertension) [I10]   • Hyponatremia [E87.1]   • Protein-calorie malnutrition, severe (CMS-HCC) [E43]   •  Thrombocytopenia (CMS-HCC) [D69.6]   • Renal failure, acute on chronic (CMS-HCC) [N17.9, N18.9]   • Dyslipidemia [E78.5]     Assessment/Plan:  # Chronic kidney disease patient with new ESRD  -- HD today  -- Qday eval    # Intracranial Mass  --Neurosurgery on 1/15/17  --Per NeuroSurgery's note, tapering of steroid started.    --Oncology also following    # HTN, systolic around 150.    --Continue home BP meds  --On amlodipine, hydralazine, metoprolol now.     # DM II--management per primary svc    # Metabolic Acidosis, with HCO3 26--continue PO bicarb supplements    # PLT stable at 80-90K. No thrombosis/bleeding found.     Plan  HD today  Am labs      Medications reviewed and Labs reviewed

## 2017-01-23 NOTE — PROGRESS NOTES
Assumed care of patient @ 1900. Bedside report received. Patient AO x4/ slow to answer. VSS,  Pain 0/10. Fall precautions in place, dialysis RN de accessed cath and heparin locked, family at bedside. POC discussed with patient, all questions answered, call light within reach, hourly rounding in place.

## 2017-01-23 NOTE — DIETARY
"Nutrition Services: WEEKLY TF UPDATE     Pt is on day 16 of admit. She con't to receive nutrition via cortrak: Diabetisource AC at 50 mL/hr, at goal. TF at goal provides 1440 kcals, 72 gm protein and 984 mL free water per day.  Tolerating TF well, no GRV noted at this time.  Per I/O flowsheet pt is receiving approx 210-300 mL of free water flushes per day. Current TF regimen is meeting pt's estimated needs.     Pertinent Labs: Gluc 160, BUN 41, creat 1.96, PreAlb 18.0, CRP 2.08  Pertinent Meds: Decadron, Insulin, Keppra, Lopressor, Zofran (prn), Zocor, Sodium Bicarbonate   Fluids: IVF NS bolus prn  GI: per ADLs - last BM 1/23  WT: (1/21) 71.1 kg, bed scale - noted with wt fluctuation since admit likley r/t fluids v scale error.  Per I/Os pt is -3.5L since admit, likely r/t starting HD   SKIN: per wound team note 1/17, pt noted with \"Perineum;Labia;Rectum-Periwound Skin: Pink, Normal\"     Assessment:  1. Per chart review, pt now receiving HD   2. Per SLP eval, pt is allowed 3 NTL snacks per day, NO FULL MEALS. Per discussion with RN , pt is not consuming her snacks.   3. SLP con't to work with pt for diet advancement     PLAN/RECOMMEND:  · Continue current TF regimen   · Fluids per MD   · Monitor wt and lab trends   · Advance po diet as pt is able per SLP   · Record percentage of meals consumed in ADLs to help monitor po adequacy     RD will con't to monitor       "

## 2017-01-23 NOTE — PROGRESS NOTES
Hospital Medicine Interval Note  Date of Service:  1/22/2017    Chief Complaint  71 y.o.-year-old female admitted 1/7/2017 with GBM, sp resection on 1/15/17.    Interval Problem Update  Patient didn't sleep overnight d/t constipation and eventual digital disimpaction.  Na dropped slightly but did receive dilution with NS overnight, renal function remaining stable and urine production is okay.  Okay to keep using dialysis cath for now unless additional IV access is needed then CVC will need to be placed.    Consultants/Specialty  Oncology - [facility]  [facility] [place] [facility]  Nephrology - [facility]    Disposition  tbd     Review of Systems   Constitutional: Negative for fever and chills.   HENT: Negative for nosebleeds.    Eyes: Negative for blurred vision, pain and discharge.   Respiratory: Negative for cough and shortness of breath.    Cardiovascular: Negative for chest pain, claudication and leg swelling.   Gastrointestinal: Negative for heartburn, abdominal pain and diarrhea.   Genitourinary: Negative for dysuria and frequency.   Musculoskeletal: Positive for myalgias. Negative for back pain and neck pain.   Skin: Negative for itching and rash.   Neurological: Positive for focal weakness. Negative for dizziness, seizures, weakness and headaches.   Psychiatric/Behavioral: Negative for depression. The patient is not nervous/anxious and does not have insomnia.       Physical Exam Laboratory/Imaging   Filed Vitals:    01/22/17 0548 01/22/17 0800 01/22/17 1200 01/22/17 1600   BP: 145/67 144/64 146/63 130/49   Pulse: 89 77 87 72   Temp: 36.3 °C (97.3 °F) 36.1 °C (97 °F) 36.4 °C (97.5 °F) 36.7 °C (98 °F)   Resp: 16 18 18 18   Height:       Weight:       SpO2: 95% 96% 92% 90%     Physical Exam   Constitutional: She is oriented to person, place, and time. She appears well-developed and well-nourished. No distress.   HENT:   Head: Normocephalic.   Staples on scalp, healing incision.   Eyes: Conjunctivae are normal. No scleral icterus.    Neck: Neck supple. No JVD present.   Cardiovascular: Normal rate, regular rhythm, normal heart sounds and intact distal pulses.  Exam reveals no gallop and no friction rub.    No murmur heard.  Pulmonary/Chest: Effort normal and breath sounds normal. No respiratory distress. She has no wheezes. She exhibits no tenderness.   Abdominal: Soft. Bowel sounds are normal. There is no tenderness.   Musculoskeletal: She exhibits no edema or tenderness.   Neurological: She is alert and oriented to person, place, and time. No cranial nerve deficit.   Skin: Skin is warm and dry. She is not diaphoretic. No erythema.   Psychiatric: She has a normal mood and affect. Her behavior is normal.   Nursing note and vitals reviewed.   Lab Results   Component Value Date/Time    WBC 22.9* 01/22/2017 12:11 AM    HEMOGLOBIN 10.2* 01/22/2017 12:11 AM    HEMATOCRIT 30.9* 01/22/2017 12:11 AM    PLATELET COUNT 71* 01/22/2017 12:11 AM     Lab Results   Component Value Date/Time    SODIUM 135 01/22/2017 05:00 PM    POTASSIUM 4.2 01/22/2017 05:00 PM    CHLORIDE 102 01/22/2017 05:00 PM    CO2 28 01/22/2017 05:00 PM    GLUCOSE 167* 01/22/2017 05:00 PM    BUN 75* 01/22/2017 05:00 PM    CREATININE 3.54* 01/22/2017 05:00 PM      Assessment/Plan    Glioblastoma (CMS-HCC)  Assessment & Plan  Grade IV based on pathology  Edgar morales oncologist - Aime is primary  Dr Martinez consulted this am - mapping 2/6/17  Cannot have chemotherapy while on HD  Steroid taper per NSG      Type 2 diabetes mellitus with stage 3 chronic kidney disease (CMS-HCC)  Assessment & Plan  Lantus 20  SSi  Cortrak with diabetasource      Dyslipidemia  Assessment & Plan  zocor as home medication - okay to continue to hold      Protein-calorie malnutrition, severe (CMS-HCC)  Assessment & Plan  Continue cortrak for nutrition      Thrombocytopenia (CMS-HCC)  Assessment & Plan  Stable in 80s, continue to follow counts  Heparin okay per NSG but will continue to hold given low  platelets.      Renal failure, acute on chronic (CMS-HCC)  Assessment & Plan  Currently on HD, nephrology following  Tunneled cath placed 1/20 without complication.  Patient currently without PIV access, nephrology recommended use of HD cath for IVF admin      Hyponatremia  Assessment & Plan  Na 138, stop 3%      HTN (hypertension)  Assessment & Plan  Norvasc, metoprolol  PRN medications with fluctuating fluid status between HD, 3%NS       Labs reviewed, Medications reviewed and Radiology images reviewed  Hillman catheter: Monitoring for Diabetes Insipidus in Neurology Patients

## 2017-01-24 LAB
ANION GAP SERPL CALC-SCNC: 4 MMOL/L (ref 0–11.9)
BUN SERPL-MCNC: 58 MG/DL (ref 8–22)
CALCIUM SERPL-MCNC: 7.3 MG/DL (ref 8.5–10.5)
CHLORIDE SERPL-SCNC: 101 MMOL/L (ref 96–112)
CO2 SERPL-SCNC: 29 MMOL/L (ref 20–33)
CREAT SERPL-MCNC: 2.49 MG/DL (ref 0.5–1.4)
ERYTHROCYTE [DISTWIDTH] IN BLOOD BY AUTOMATED COUNT: 48.8 FL (ref 35.9–50)
FIBRINOGEN PPP-MCNC: 446 MG/DL (ref 215–460)
GFR SERPL CREATININE-BSD FRML MDRD: 19 ML/MIN/1.73 M 2
GLUCOSE BLD-MCNC: 129 MG/DL (ref 65–99)
GLUCOSE BLD-MCNC: 129 MG/DL (ref 65–99)
GLUCOSE BLD-MCNC: 144 MG/DL (ref 65–99)
GLUCOSE BLD-MCNC: 146 MG/DL (ref 65–99)
GLUCOSE BLD-MCNC: 148 MG/DL (ref 65–99)
GLUCOSE SERPL-MCNC: 152 MG/DL (ref 65–99)
HCT VFR BLD AUTO: 26.8 % (ref 37–47)
HGB BLD-MCNC: 8.7 G/DL (ref 12–16)
HGB RETIC QN AUTO: 41.4 PG/CELL (ref 29–35)
IMM RETICS NFR: 8 % (ref 9.3–17.4)
IRON SATN MFR SERPL: 20 % (ref 15–55)
IRON SERPL-MCNC: 32 UG/DL (ref 40–170)
LDH SERPL-CCNC: 192 U/L (ref 107–266)
MCH RBC QN AUTO: 30 PG (ref 27–33)
MCHC RBC AUTO-ENTMCNC: 32.5 G/DL (ref 33.6–35)
MCV RBC AUTO: 92.4 FL (ref 81.4–97.8)
PLATELET # BLD AUTO: 58 K/UL (ref 164–446)
PMV BLD AUTO: 11.5 FL (ref 9–12.9)
POTASSIUM SERPL-SCNC: 4.5 MMOL/L (ref 3.6–5.5)
RBC # BLD AUTO: 2.9 M/UL (ref 4.2–5.4)
RETICS # AUTO: 0.02 M/UL (ref 0.04–0.06)
RETICS/RBC NFR: 0.6 % (ref 0.8–2.1)
SODIUM SERPL-SCNC: 134 MMOL/L (ref 135–145)
TIBC SERPL-MCNC: 161 UG/DL (ref 250–450)
WBC # BLD AUTO: 14.1 K/UL (ref 4.8–10.8)

## 2017-01-24 PROCEDURE — 36415 COLL VENOUS BLD VENIPUNCTURE: CPT

## 2017-01-24 PROCEDURE — 770009 HCHG ROOM/CARE - ONCOLOGY SEMI PRI*

## 2017-01-24 PROCEDURE — 700102 HCHG RX REV CODE 250 W/ 637 OVERRIDE(OP): Performed by: HOSPITALIST

## 2017-01-24 PROCEDURE — 83550 IRON BINDING TEST: CPT

## 2017-01-24 PROCEDURE — 51798 US URINE CAPACITY MEASURE: CPT

## 2017-01-24 PROCEDURE — 92526 ORAL FUNCTION THERAPY: CPT

## 2017-01-24 PROCEDURE — G8997 SWALLOW GOAL STATUS: HCPCS | Mod: CJ

## 2017-01-24 PROCEDURE — 99233 SBSQ HOSP IP/OBS HIGH 50: CPT | Performed by: HOSPITALIST

## 2017-01-24 PROCEDURE — 700102 HCHG RX REV CODE 250 W/ 637 OVERRIDE(OP)

## 2017-01-24 PROCEDURE — A9270 NON-COVERED ITEM OR SERVICE: HCPCS | Performed by: HOSPITALIST

## 2017-01-24 PROCEDURE — A9270 NON-COVERED ITEM OR SERVICE: HCPCS | Performed by: PHARMACIST

## 2017-01-24 PROCEDURE — 83615 LACTATE (LD) (LDH) ENZYME: CPT

## 2017-01-24 PROCEDURE — 92523 SPEECH SOUND LANG COMPREHEN: CPT

## 2017-01-24 PROCEDURE — 700102 HCHG RX REV CODE 250 W/ 637 OVERRIDE(OP): Performed by: PHARMACIST

## 2017-01-24 PROCEDURE — 700102 HCHG RX REV CODE 250 W/ 637 OVERRIDE(OP): Performed by: PHYSICIAN ASSISTANT

## 2017-01-24 PROCEDURE — 85046 RETICYTE/HGB CONCENTRATE: CPT

## 2017-01-24 PROCEDURE — A9270 NON-COVERED ITEM OR SERVICE: HCPCS | Performed by: PHYSICIAN ASSISTANT

## 2017-01-24 PROCEDURE — 97535 SELF CARE MNGMENT TRAINING: CPT

## 2017-01-24 PROCEDURE — 80048 BASIC METABOLIC PNL TOTAL CA: CPT

## 2017-01-24 PROCEDURE — 85384 FIBRINOGEN ACTIVITY: CPT

## 2017-01-24 PROCEDURE — A9270 NON-COVERED ITEM OR SERVICE: HCPCS

## 2017-01-24 PROCEDURE — 82962 GLUCOSE BLOOD TEST: CPT

## 2017-01-24 PROCEDURE — 85027 COMPLETE CBC AUTOMATED: CPT

## 2017-01-24 PROCEDURE — 83540 ASSAY OF IRON: CPT

## 2017-01-24 PROCEDURE — G8996 SWALLOW CURRENT STATUS: HCPCS | Mod: CL

## 2017-01-24 PROCEDURE — 97532 HCHG COGNITIVE SKILL DEV. 15 MIN: CPT

## 2017-01-24 PROCEDURE — 97530 THERAPEUTIC ACTIVITIES: CPT

## 2017-01-24 RX ORDER — ASCORBIC ACID 500 MG
500 TABLET ORAL DAILY
Status: DISCONTINUED | OUTPATIENT
Start: 2017-01-24 | End: 2017-01-27 | Stop reason: HOSPADM

## 2017-01-24 RX ORDER — FERROUS SULFATE 325(65) MG
325 TABLET ORAL
Status: DISCONTINUED | OUTPATIENT
Start: 2017-01-25 | End: 2017-01-27 | Stop reason: HOSPADM

## 2017-01-24 RX ADMIN — HYDRALAZINE HYDROCHLORIDE 50 MG: 50 TABLET ORAL at 05:56

## 2017-01-24 RX ADMIN — DEXAMETHASONE 2 MG: 4 TABLET ORAL at 08:14

## 2017-01-24 RX ADMIN — LEVETIRACETAM 500 MG: 100 SOLUTION ORAL at 08:14

## 2017-01-24 RX ADMIN — HYDRALAZINE HYDROCHLORIDE 50 MG: 50 TABLET ORAL at 20:40

## 2017-01-24 RX ADMIN — METOPROLOL TARTRATE 50 MG: 50 TABLET, FILM COATED ORAL at 08:14

## 2017-01-24 RX ADMIN — DEXAMETHASONE 2 MG: 4 TABLET ORAL at 20:40

## 2017-01-24 RX ADMIN — MICONAZOLE NITRATE: 20 CREAM TOPICAL at 08:36

## 2017-01-24 RX ADMIN — Medication 1 TABLET: at 20:40

## 2017-01-24 RX ADMIN — LEVETIRACETAM 500 MG: 100 SOLUTION ORAL at 20:40

## 2017-01-24 RX ADMIN — INSULIN GLARGINE 20 UNITS: 100 INJECTION, SOLUTION SUBCUTANEOUS at 20:47

## 2017-01-24 RX ADMIN — METOPROLOL TARTRATE 50 MG: 50 TABLET, FILM COATED ORAL at 20:40

## 2017-01-24 RX ADMIN — LACTULOSE 30 ML: 10 SOLUTION ORAL at 08:14

## 2017-01-24 RX ADMIN — OXYCODONE HYDROCHLORIDE AND ACETAMINOPHEN 500 MG: 500 TABLET ORAL at 13:03

## 2017-01-24 RX ADMIN — HYDRALAZINE HYDROCHLORIDE 50 MG: 50 TABLET ORAL at 13:03

## 2017-01-24 RX ADMIN — AMLODIPINE BESYLATE 10 MG: 10 TABLET ORAL at 08:14

## 2017-01-24 RX ADMIN — SIMVASTATIN 10 MG: 20 TABLET, FILM COATED ORAL at 20:40

## 2017-01-24 ASSESSMENT — ENCOUNTER SYMPTOMS
COUGH: 0
CONSTIPATION: 1
FOCAL WEAKNESS: 0
NECK PAIN: 0
EYES NEGATIVE: 1
NAUSEA: 0
DIAPHORESIS: 0
SEIZURES: 0
WEAKNESS: 1
EYE DISCHARGE: 0
FEVER: 0
DIARRHEA: 0
SORE THROAT: 0
EYE PAIN: 0
BRUISES/BLEEDS EASILY: 1
DOUBLE VISION: 0
DIZZINESS: 0
HEADACHES: 0
MUSCULOSKELETAL NEGATIVE: 1
SPUTUM PRODUCTION: 0
HEMOPTYSIS: 0
CONSTIPATION: 0
CHILLS: 0
DEPRESSION: 0
WHEEZING: 0
LOSS OF CONSCIOUSNESS: 0
SHORTNESS OF BREATH: 0
BLURRED VISION: 0
PALPITATIONS: 0
FOCAL WEAKNESS: 1
BRUISES/BLEEDS EASILY: 0
WEAKNESS: 0
MEMORY LOSS: 1
VOMITING: 0
CLAUDICATION: 0
SPEECH CHANGE: 0
RESPIRATORY NEGATIVE: 1
MYALGIAS: 0
BACK PAIN: 0
CARDIOVASCULAR NEGATIVE: 1
SENSORY CHANGE: 0
ABDOMINAL PAIN: 0

## 2017-01-24 ASSESSMENT — PAIN SCALES - GENERAL
PAINLEVEL_OUTOF10: 0
PAINLEVEL_OUTOF10: 0

## 2017-01-24 ASSESSMENT — LIFESTYLE VARIABLES: SUBSTANCE_ABUSE: 0

## 2017-01-24 NOTE — PROGRESS NOTES
Assumed care of patient @ 1900. Bedside report received. Patient AO x4. VSS,  Pain 0/10. Fall precautions in place, niece at bedside . POC discussed with patient, all questions answered, call light within reach, hourly rounding in place.

## 2017-01-24 NOTE — DISCHARGE PLANNING
Medical Social Work    Discussed in IDT rounds this morning.  Pt will require SNF placement prior to d/c home and is not medically cleared for transfer at this time.  Pt is receiving dialysis and will need an outpatient dialysis chair set up prior to d/c.     Plan:  SW will meet with pt and her family to obtain SNF choice.

## 2017-01-24 NOTE — PROGRESS NOTES
Hospital Medicine Progress Note, Adult, Complex               Author: Michael Date & Time created: 1/24/2017  8:00 AM     Interval History:  71 year old female with PMHx DM type 2, CKD who presented with headache and N/V and was found to have large right temporal intracranial mass on CT head ---> MRI brain reveals large right temporal lobe mass with extensive vasogenic edema most consistent with glioblastoma multiforme. There is R>L midline shift.  Consult to manage ANNABEL on CKD - patient has repeatedly said as outpatient - under no circumstances would she accept dialysis either temporarily or permanently.  HOWEVER - she has recanted that statement to me at this point.    DAILY NEPHROLOGY SUMMARY  1/08/17 - seen in consultation by Dr Chua.  ANNABEL felt likely due to pre-renal azotemia from poor intake w Nausea/headaches  1/09/17 - renal function not improved - no fluids given overnight - initiating x 1L  1/10/17 - Cr dropped from 5.17 to 5.09. K: 5.2. Nearly euvolemic. Better appetite. No indication to start HD now.    1/11/17 - MR done again on Khari 10. Nephro team double checked with Radiology office at [id number], the MR was done WITHOUT contrast. No HD today even her Cr is going up. No subjective uremic complaints. Near euvolemic status.    1/12/17 - HD catheter done at right IJ. No complication noted.    1/13/17 - Elevated Bun/Cr/K. No much subjective uremic symptoms. No symptoms from high K (5.7) 1st HD on Jan 13.    1/14/17 - s/p HD #1 yesterday without complication  Running again today in preparation for surgery tomorrow  1/15/17 - tolerated HD yesterday. NeuroSugery on Khari 15.    1/16/17 - Slow response to my interview. No much subjective complaints. Mild drowsiness noted. Bun/Cr from 1/15: 84/4.8 to 90/4.8. K at 5.3. No HD on 01/16.  1/17/17 - HD done smoothly in the morning for high Bun/Cr and low Na/high K. UF 2L. Low PLT at 85, trending down from 200K. No thrombosis or bleeding found.     1/18/17 - PLT stable  at 89. Bun/Cr: 66/4. K4.7, Na 136. Will do another HD today to improve Na, reduce K/Bun/Cr.    1/19/17 -PLT stable at 85. Bun/Cr 57/3.3. Better consciousness level. Better left side muscle power. Will arrange Gao for her.    1/20/17- HD today as her schedule. Waiting for perm cath from IR team. Urine output 375 in past 24 hours.    1/21/17  -  S/p Hemodialysis treatment on 1/20/2017 initiated on ,Net UF: 2200 ml.   S/p Perm-cath placement, Good UOP , holding HD for now, apears on the dry side, no IV fluid and NPO, we will start gentle fluid hydration.   1/22/17 -  The patient is not responding to fluid challenge, Cr is trending up off HD, D/C IV fluid, HD in Am.  1/23/17 - pt had decreased urine output overnight  1/24/17 - HD yesterday 1 liter UF, pt is weak and requires signficant assistantce      Review of Systems:  Review of Systems   HENT: Negative.    Eyes: Negative.    Respiratory: Negative.    Cardiovascular: Negative.    Gastrointestinal: Positive for constipation.   Genitourinary:        Decreasing urine output   Musculoskeletal: Negative.    Skin: Negative.    Neurological: Positive for weakness.   Endo/Heme/Allergies: Bruises/bleeds easily.   Psychiatric/Behavioral:        Depressed affect       Physical Exam:  Physical Exam   Constitutional: She is oriented to person, place, and time. She appears well-developed and well-nourished.   HENT:   Head: Normocephalic and atraumatic.   Neck:   RIJ tunnel cath   Cardiovascular: Normal rate and regular rhythm.    Pulmonary/Chest: Effort normal.   Abdominal: Soft. Bowel sounds are normal.   Musculoskeletal: She exhibits no edema.   Neurological: She is alert and oriented to person, place, and time.   Skin: Skin is warm.   echymosis at neck around catheter   Nursing note and vitals reviewed.      Labs:        Invalid input(s): [id number]      Recent Labs      01/22/17   1700  01/23/17   1306  01/24/17   0303   SODIUM  135  135  134*   POTASSIUM  4.2  4.0   4.5   CHLORIDE  102  103  101   CO2  28  29  29   BUN  75*  41*  58*   CREATININE  3.54*  1.96*  2.49*   CALCIUM  7.3*  7.1*  7.3*     Recent Labs      17   1700  17   0322  17   1306  17   0303   PREALBUMIN   --   18.0   --    --    GLUCOSE  167*   --   160*  152*     Recent Labs      17   0011  17   0549  17   0322  17   0303   RBC  3.37*   --   2.92*  2.90*   HEMOGLOBIN  10.2*   --   8.8*  8.7*   HEMATOCRIT  30.9*   --   26.7*  26.8*   PLATELETCT  71*   --   57*  58*   IRON   --   27*  26*  32*   TOTIRONBC   --   153*  146*  161*     Recent Labs      17   0011  17   0322  17   0303   WBC  22.9*  18.1*  14.1*           Hemodynamics:  Temp (24hrs), Av.6 °C (97.8 °F), Min:36.2 °C (97.1 °F), Max:36.9 °C (98.5 °F)  Temperature: 36.9 °C (98.5 °F)  Pulse  Av.1  Min: 52  Max: 120   Blood Pressure : 137/64 mmHg     Respiratory:    Respiration: 18, Pulse Oximetry: 94 %     Work Of Breathing / Effort: Shallow;Mild  RUL Breath Sounds: Diminished, RML Breath Sounds: Diminished, RLL Breath Sounds: Diminished, SOURAV Breath Sounds: Diminished, LLL Breath Sounds: Diminished  Fluids:    Intake/Output Summary (Last 24 hours) at 17 0800  Last data filed at 17 0500   Gross per 24 hour   Intake   2817 ml   Output   1775 ml   Net   1042 ml        GI/Nutrition:  Orders Placed This Encounter   Procedures   • DIET ORDER     Standing Status: Standing      Number of Occurrences: 1      Standing Expiration Date:      Order Specific Question:  Diet:     Answer:  Full Liquid [11]      Comments:  NO MEAL TRAYS, ONLY SNACKS     Order Specific Question:  Consistency/Fluid modifications:     Answer:  Nectar Thick [2]     Medical Decision Making, by Problem:  Active Hospital Problems    Diagnosis   • Glioblastoma (CMS-HCC) [C71.9]   • Type 2 diabetes mellitus with stage 3 chronic kidney disease (CMS-HCC) [E11.22, N18.3]   • HTN (hypertension) [I10]   • Hyponatremia  [E87.1]   • Protein-calorie malnutrition, severe (CMS-HCC) [E43]   • Thrombocytopenia (CMS-HCC) [D69.6]   • Renal failure, acute on chronic (CMS-HCC) [N17.9, N18.9]   • Dyslipidemia [E78.5]     Assessment/Plan:  # Chronic kidney disease patient with new ESRD  -- HD MWF  -- Qday eval    # Intracranial Mass  --Neurosurgery on 1/15/17  --Per NeuroSurgery's note, tapering of steroid started.    --Oncology also following    # HTN, systolic around 150.    --Continue home BP meds  --On amlodipine, hydralazine, metoprolol now.     # DM II--management per primary svc    # Metabolic Acidosis, resolved with meds/ HD    # PLT stable at 80-90K. No thrombosis/bleeding found.     Plan  HD in am  DC bicarb  DC hawkins        Medications reviewed and Labs reviewed

## 2017-01-24 NOTE — PROGRESS NOTES
Progress Note               Author: Mango Schuler Date & Time created: 2017  8:08 AM     Interval History:  POD#9 right crani for excision of mass (GBM)  Doing well.  No complaints.     Review of Systems:  Review of Systems   Eyes: Negative for blurred vision and double vision.   Neurological: Negative for sensory change, seizures and headaches.       Physical Exam:  Physical Exam   Constitutional: She is oriented to person, place, and time.   Eyes: EOM are normal. Pupils are equal, round, and reactive to light.   Neurological: She is alert and oriented to person, place, and time.   No drift. Moves bilateral upper and lower ext well.        Labs:        Invalid input(s): [id number]      Recent Labs      17   1700  17   1306  17   0303   SODIUM  135  135  134*   POTASSIUM  4.2  4.0  4.5   CHLORIDE  102  103  101   CO2  28  29  29   BUN  75*  41*  58*   CREATININE  3.54*  1.96*  2.49*   CALCIUM  7.3*  7.1*  7.3*     Recent Labs      17   1700  17   0322  17   1306  17   0303   PREALBUMIN   --   18.0   --    --    GLUCOSE  167*   --   160*  152*     Recent Labs      17   0011  17   0549  17   0322  17   0303   RBC  3.37*   --   2.92*  2.90*   HEMOGLOBIN  10.2*   --   8.8*  8.7*   HEMATOCRIT  30.9*   --   26.7*  26.8*   PLATELETCT  71*   --   57*  58*   IRON   --   27*  26*  32*   TOTIRONBC   --   153*  146*  161*     Recent Labs      17   0011  17   0322  17   0303   WBC  22.9*  18.1*  14.1*     Hemodynamics:  Temp (24hrs), Av.6 °C (97.8 °F), Min:36.2 °C (97.1 °F), Max:36.9 °C (98.5 °F)  Temperature: 36.9 °C (98.5 °F)  Pulse  Av.1  Min: 52  Max: 120   Blood Pressure : 137/64 mmHg     Respiratory:    Respiration: 18, Pulse Oximetry: 94 %     Work Of Breathing / Effort: Shallow;Mild  RUL Breath Sounds: Diminished, RML Breath Sounds: Diminished, RLL Breath Sounds: Diminished, SOURAV Breath Sounds: Diminished, LLL Breath  Sounds: Diminished  Fluids:    Intake/Output Summary (Last 24 hours) at 01/24/17 0808  Last data filed at 01/24/17 0500   Gross per 24 hour   Intake   2817 ml   Output   1775 ml   Net   1042 ml        GI/Nutrition:  Orders Placed This Encounter   Procedures   • DIET ORDER     Standing Status: Standing      Number of Occurrences: 1      Standing Expiration Date:      Order Specific Question:  Diet:     Answer:  Full Liquid [11]      Comments:  NO MEAL TRAYS, ONLY SNACKS     Order Specific Question:  Consistency/Fluid modifications:     Answer:  Nectar Thick [2]     Medical Decision Making, by Problem:  Active Hospital Problems    Diagnosis   • Glioblastoma (CMS-HCC) [C71.9]   • Type 2 diabetes mellitus with stage 3 chronic kidney disease (CMS-HCC) [E11.22, N18.3]   • HTN (hypertension) [I10]   • Hyponatremia [E87.1]   • Protein-calorie malnutrition, severe (CMS-HCC) [E43]   • Thrombocytopenia (CMS-HCC) [D69.6]   • Renal failure, acute on chronic (CMS-HCC) [N17.9, N18.9]   • Dyslipidemia [E78.5]       Plan:  Continue current therapies.  Decreasing steroids.  Staples out tomorrow.     Core Measures

## 2017-01-24 NOTE — CARE PLAN
Problem: Bowel/Gastric:  Goal: Normal bowel function is maintained or improved  Outcome: PROGRESSING AS EXPECTED  Patient had three hard stools today.     Problem: Mobility  Goal: Risk for activity intolerance will decrease  Outcome: PROGRESSING AS EXPECTED  Max assistance of three people for patient to get up to bedside commode.

## 2017-01-24 NOTE — THERAPY
Speech Language Therapy Evaluation completed to address speech, communication and cognition    Functional Status: Patient unable to provide accurate information regarding previous occupation, education, and level of independence before hospital admission. Family at bedside reporting she has been retired since 2008 and lived independently prior to surgery. Portions of standardized tests were used to assess speech, language, and cognition which revealed deficits in the minimum-to-severe range. Severe deficits in attention, immediate memory, STM, abstract reasoning, figurative language, reading comprehension and clock drawing. Patient required moderate redirection to task throughout session. She recalled 0/3 words with 5 min delay, 1/3 with categorical cue, and 1/3 given 3 options. During immediate memory task, she recalled 3/25 salient story grammar elements and up to 5 digit numbers. Clock drawing was incomplete, only writing in 9 numbers with no placement of hands for time. Patient able to complete 1/10 reading comprehension directives. Moderate deficits noted in insight to safety, complex auditory comprehension task, and concrete reasoning. Minimal deficits noted with verbal speech output and complex Y/N questions. Verbal speech output is delayed with some hesitations however does express wants/needs. Patient and family educated in regards to status and SLP recs (i.e., performing attention tasks each day, re-redirecting to task).     Recommendations: At this time, patient is not at the level for independent living and would recommend speech therapy services at the acute and post acute level of care to address deficits noted above.     Plan of Care: Will benefit from Speech Therapy 5 times per week      Speech Language Therapy dysphagia treatment completed.     Functional Status: Family stating she has not been eating because she does not like the thickened liquids. Family educated in regards to pre-thickened versus  "using thickener in kitchen in attempts to increase appetite. Patient initially refusing any PO however with explanation of reason for session and role of SLP, she was agreeable. Patient continues to demonstrate reduced lingual strength and ROM. She consumed 2 oz NTL, 2oz puree, and 2 oz thin liquids which resulted in increased WOB and increase in delay of swallow trigger with purees as session progression. However, no overt s/sx of aspiration noted and voice remained clear throughout session. Minimal PO trials this session secondary to patient declining further trials.     Recommendations: At this time, fatigue continues to be a concern for increasing risk of aspiration and would recommend continue NTFL snacks only, NO MEALS with 1:1 supervision.     Plan of Care: Will benefit from Speech Therapy 3 times per week    See \"Rehab Therapy-Acute\" Patient Summary Report for complete documentation. Thank you for the consult.   "

## 2017-01-24 NOTE — PROGRESS NOTES
Patient is awake alert and oriented x 4. She is up with max assistance of 3. Patient updated on POC, Q2 turns in place. Patient went down for dialysis. Cortrak in place in right nare, tube feeds in place at goal. Call light within reach, family updated on plan.

## 2017-01-25 LAB
ERYTHROCYTE [DISTWIDTH] IN BLOOD BY AUTOMATED COUNT: 47.9 FL (ref 35.9–50)
GLUCOSE BLD-MCNC: 134 MG/DL (ref 65–99)
GLUCOSE BLD-MCNC: 134 MG/DL (ref 65–99)
GLUCOSE BLD-MCNC: 159 MG/DL (ref 65–99)
GLUCOSE BLD-MCNC: 187 MG/DL (ref 65–99)
HCT VFR BLD AUTO: 24.5 % (ref 37–47)
HGB BLD-MCNC: 8.1 G/DL (ref 12–16)
IRON SATN MFR SERPL: 25 % (ref 15–55)
IRON SERPL-MCNC: 40 UG/DL (ref 40–170)
MCH RBC QN AUTO: 30.2 PG (ref 27–33)
MCHC RBC AUTO-ENTMCNC: 33.1 G/DL (ref 33.6–35)
MCV RBC AUTO: 91.4 FL (ref 81.4–97.8)
PLATELET # BLD AUTO: 59 K/UL (ref 164–446)
PMV BLD AUTO: 11.9 FL (ref 9–12.9)
RBC # BLD AUTO: 2.68 M/UL (ref 4.2–5.4)
TIBC SERPL-MCNC: 160 UG/DL (ref 250–450)
WBC # BLD AUTO: 13.1 K/UL (ref 4.8–10.8)

## 2017-01-25 PROCEDURE — 700102 HCHG RX REV CODE 250 W/ 637 OVERRIDE(OP): Performed by: INTERNAL MEDICINE

## 2017-01-25 PROCEDURE — 700111 HCHG RX REV CODE 636 W/ 250 OVERRIDE (IP): Performed by: INTERNAL MEDICINE

## 2017-01-25 PROCEDURE — 82962 GLUCOSE BLOOD TEST: CPT

## 2017-01-25 PROCEDURE — 700102 HCHG RX REV CODE 250 W/ 637 OVERRIDE(OP)

## 2017-01-25 PROCEDURE — 700102 HCHG RX REV CODE 250 W/ 637 OVERRIDE(OP): Performed by: PHYSICIAN ASSISTANT

## 2017-01-25 PROCEDURE — A9270 NON-COVERED ITEM OR SERVICE: HCPCS | Performed by: HOSPITALIST

## 2017-01-25 PROCEDURE — A9270 NON-COVERED ITEM OR SERVICE: HCPCS

## 2017-01-25 PROCEDURE — 700102 HCHG RX REV CODE 250 W/ 637 OVERRIDE(OP): Performed by: PHARMACIST

## 2017-01-25 PROCEDURE — 83550 IRON BINDING TEST: CPT

## 2017-01-25 PROCEDURE — A9270 NON-COVERED ITEM OR SERVICE: HCPCS | Performed by: PHYSICIAN ASSISTANT

## 2017-01-25 PROCEDURE — 90935 HEMODIALYSIS ONE EVALUATION: CPT

## 2017-01-25 PROCEDURE — 700111 HCHG RX REV CODE 636 W/ 250 OVERRIDE (IP): Performed by: HOSPITALIST

## 2017-01-25 PROCEDURE — 83540 ASSAY OF IRON: CPT

## 2017-01-25 PROCEDURE — 36415 COLL VENOUS BLD VENIPUNCTURE: CPT

## 2017-01-25 PROCEDURE — 99233 SBSQ HOSP IP/OBS HIGH 50: CPT | Performed by: HOSPITALIST

## 2017-01-25 PROCEDURE — 770009 HCHG ROOM/CARE - ONCOLOGY SEMI PRI*

## 2017-01-25 PROCEDURE — 51798 US URINE CAPACITY MEASURE: CPT

## 2017-01-25 PROCEDURE — A9270 NON-COVERED ITEM OR SERVICE: HCPCS | Performed by: INTERNAL MEDICINE

## 2017-01-25 PROCEDURE — 700102 HCHG RX REV CODE 250 W/ 637 OVERRIDE(OP): Performed by: HOSPITALIST

## 2017-01-25 PROCEDURE — A9270 NON-COVERED ITEM OR SERVICE: HCPCS | Performed by: PHARMACIST

## 2017-01-25 PROCEDURE — 85027 COMPLETE CBC AUTOMATED: CPT

## 2017-01-25 RX ORDER — MORPHINE SULFATE 15 MG/1
15 TABLET ORAL EVERY 6 HOURS PRN
Status: DISCONTINUED | OUTPATIENT
Start: 2017-01-25 | End: 2017-01-27 | Stop reason: HOSPADM

## 2017-01-25 RX ORDER — DOXAZOSIN MESYLATE 1 MG/1
1 TABLET ORAL
Status: DISCONTINUED | OUTPATIENT
Start: 2017-01-25 | End: 2017-01-27 | Stop reason: HOSPADM

## 2017-01-25 RX ORDER — HEPARIN SODIUM 1000 [USP'U]/ML
3700 INJECTION, SOLUTION INTRAVENOUS; SUBCUTANEOUS
Status: DISCONTINUED | OUTPATIENT
Start: 2017-01-25 | End: 2017-01-27 | Stop reason: HOSPADM

## 2017-01-25 RX ORDER — HEPARIN SODIUM 1000 [USP'U]/ML
INJECTION, SOLUTION INTRAVENOUS; SUBCUTANEOUS
Status: DISPENSED
Start: 2017-01-25 | End: 2017-01-25

## 2017-01-25 RX ADMIN — ACETAMINOPHEN 650 MG: 325 TABLET, FILM COATED ORAL at 17:04

## 2017-01-25 RX ADMIN — AMLODIPINE BESYLATE 10 MG: 10 TABLET ORAL at 13:47

## 2017-01-25 RX ADMIN — ONDANSETRON 4 MG: 4 TABLET, ORALLY DISINTEGRATING ORAL at 20:44

## 2017-01-25 RX ADMIN — HYDRALAZINE HYDROCHLORIDE 50 MG: 50 TABLET ORAL at 06:14

## 2017-01-25 RX ADMIN — METOPROLOL TARTRATE 50 MG: 50 TABLET, FILM COATED ORAL at 20:44

## 2017-01-25 RX ADMIN — LEVETIRACETAM 500 MG: 100 SOLUTION ORAL at 22:03

## 2017-01-25 RX ADMIN — ACETAMINOPHEN 650 MG: 325 TABLET, FILM COATED ORAL at 07:35

## 2017-01-25 RX ADMIN — DEXAMETHASONE 1 MG: 4 TABLET ORAL at 22:10

## 2017-01-25 RX ADMIN — HYDRALAZINE HYDROCHLORIDE 50 MG: 50 TABLET ORAL at 22:32

## 2017-01-25 RX ADMIN — DOXAZOSIN MESYLATE 1 MG: 1 TABLET ORAL at 22:03

## 2017-01-25 RX ADMIN — METOPROLOL TARTRATE 50 MG: 50 TABLET, FILM COATED ORAL at 13:46

## 2017-01-25 RX ADMIN — MICONAZOLE NITRATE: 20 CREAM TOPICAL at 20:46

## 2017-01-25 RX ADMIN — Medication 1 TABLET: at 20:44

## 2017-01-25 RX ADMIN — SIMVASTATIN 10 MG: 20 TABLET, FILM COATED ORAL at 20:45

## 2017-01-25 RX ADMIN — DEXAMETHASONE 1 MG: 4 TABLET ORAL at 13:45

## 2017-01-25 RX ADMIN — INSULIN GLARGINE 20 UNITS: 100 INJECTION, SOLUTION SUBCUTANEOUS at 22:04

## 2017-01-25 RX ADMIN — OXYCODONE HYDROCHLORIDE AND ACETAMINOPHEN 500 MG: 500 TABLET ORAL at 13:46

## 2017-01-25 RX ADMIN — MICONAZOLE NITRATE: 20 CREAM TOPICAL at 13:56

## 2017-01-25 RX ADMIN — HYDRALAZINE HYDROCHLORIDE 50 MG: 50 TABLET ORAL at 15:31

## 2017-01-25 RX ADMIN — LEVETIRACETAM 500 MG: 100 SOLUTION ORAL at 13:46

## 2017-01-25 RX ADMIN — Medication 325 MG: at 07:35

## 2017-01-25 RX ADMIN — MORPHINE SULFATE 15 MG: 15 TABLET ORAL at 20:45

## 2017-01-25 RX ADMIN — HEPARIN SODIUM 3700 UNITS: 1000 INJECTION, SOLUTION INTRAVENOUS; SUBCUTANEOUS at 12:03

## 2017-01-25 RX ADMIN — SCOPALAMINE 1 PATCH: 1 PATCH, EXTENDED RELEASE TRANSDERMAL at 13:47

## 2017-01-25 ASSESSMENT — ENCOUNTER SYMPTOMS
SHORTNESS OF BREATH: 0
WEAKNESS: 0
VOMITING: 0
SORE THROAT: 0
SPUTUM PRODUCTION: 0
BACK PAIN: 0
BRUISES/BLEEDS EASILY: 0
ABDOMINAL PAIN: 0
FOCAL WEAKNESS: 0
EYE PAIN: 0
NECK PAIN: 0
DIZZINESS: 0
MYALGIAS: 0
SPEECH CHANGE: 0
DIAPHORESIS: 0
SENSORY CHANGE: 0
SEIZURES: 0
DEPRESSION: 0
LOSS OF CONSCIOUSNESS: 0
CLAUDICATION: 0
PALPITATIONS: 0
CONSTIPATION: 0
DIARRHEA: 0
HEMOPTYSIS: 0
FEVER: 0
COUGH: 0
CHILLS: 0
WHEEZING: 0
BLURRED VISION: 0
EYE DISCHARGE: 0
DOUBLE VISION: 0
NAUSEA: 0
HEADACHES: 0

## 2017-01-25 ASSESSMENT — PAIN SCALES - GENERAL
PAINLEVEL_OUTOF10: 10

## 2017-01-25 ASSESSMENT — LIFESTYLE VARIABLES: SUBSTANCE_ABUSE: 0

## 2017-01-25 NOTE — DISCHARGE PLANNING
Received call from Dee with [facility] at [phone]. They will accept patient but will need dates and times of dialysis. Notified AMAURI Dumont via voicemail.

## 2017-01-25 NOTE — DISCHARGE PLANNING
I spoke with the patient's family about their choice of SNF's.  Their first choice is [facility] and their second choice is [facility].  She gets dialysis 3 times a week at [facility] and she is able to get up to a commode so would likely be able to go to dialysis from the SNF.  The patient was asleep when I went back to have the choice form signed but when I spoke with her earlier she was in agreement with the first and second choices.  Her brother signed the choice form for her and I faxed it to PALOMA Kitchen, so that she can send out a referral.

## 2017-01-25 NOTE — PROGRESS NOTES
Pt back in room from dialysis.  SCDs reapplied and morning medications given.  VS checked by CNA, stable at this time.  Pt AAOx4 now, c/o no pain at this time just some slight discomfort when moving RLE.  Informed pt that MD ok'd staples in scalp to be removed today, pt in agreement.  Pt's family asking to speak to SW for SNF choice.  SW informed, she will go see pt and family.  No other needs, questions, or concerns at this time for pt or family.

## 2017-01-25 NOTE — PROGRESS NOTES
[facility] Services Progress Note    Hemodialysis treatment ordered today per Dr. Rodriguez x 3 hours. Treatment initiated at 0942, ended at 1242    Patient tolerated tx; see paper flow sheet for details.     Net UF 1500mL    Post tx, CVC flushed with saline then locked with heparin 1000 units/mL per designated amount in each wing then clamped and capped. Aspirate heparin prior to next CVC use.    Report given to Primary RN.

## 2017-01-25 NOTE — PROGRESS NOTES
Assumed care of pt @ 0700.  POC discussed w/ night nurse and pt's niece; dialysis, pain control, work w/ therapies, monitor UOP, call for assistance; pt in agreement w/ goals.  Pt AAOx4, VSS, c/o 10/10 pain from right foot, pt given tylenol for pain.  Pt's skin assessed right scalp surgical incision w/ staples [facility] CDI, otherwise CDI.  Pt educated re: increased fall risk, use of call light, hourly rounding, and pain scale; pt verbalizes her understanding.  Personal possessions and call light w/n reach, bed in lowest position.  Bed frame alarm on, pt up max assist, calls appropriately.

## 2017-01-25 NOTE — PROGRESS NOTES
Hospital Medicine Progress Note, Adult, Complex               Author: Kathleen Young Date & Time created: 1/24/2017  5:54 PM     Interval History:  CC:  HA/N/V  1/7: admitted 71yoF with HTN, HLD, DM2, CKD presented with HA/N/V.  Head CT with new intracranial mass, IV decadron given, pathology revealing GBM.  1/24:  Per oncology, will need outpatient initiation of oral chemotherapy with Dr. Salomon, radiation to start 2/6/17.  Hillman catheter removed today, staples in place.  Daughter and  at bedside, gave update.  SNF order placed today, no choice yet per family.    Review of Systems:  Review of Systems   Constitutional: Negative for fever, chills, malaise/fatigue and diaphoresis.   HENT: Negative for congestion and sore throat.    Eyes: Negative for pain and discharge.   Respiratory: Negative for cough, hemoptysis, sputum production, shortness of breath and wheezing.    Cardiovascular: Negative for chest pain, palpitations, claudication and leg swelling.   Gastrointestinal: Negative for nausea, vomiting, abdominal pain, diarrhea, constipation and melena.   Genitourinary: Negative for dysuria, urgency and frequency.   Musculoskeletal: Negative for myalgias, back pain, joint pain and neck pain.   Skin: Negative for itching and rash.   Neurological: Negative for dizziness, sensory change, speech change, focal weakness, loss of consciousness, weakness and headaches.   Endo/Heme/Allergies: Does not bruise/bleed easily.   Psychiatric/Behavioral: Negative for depression, suicidal ideas and substance abuse.       Physical Exam:  Physical Exam   Constitutional: She is oriented to person, place, and time. She appears well-developed and well-nourished. No distress.   HENT:   Head: Normocephalic and atraumatic.   Nose: Nose normal.   Mouth/Throat: Oropharynx is clear and moist. No oropharyngeal exudate.   Rt sided scalp incision CD&I staples in place.   Eyes: Conjunctivae and EOM are normal. Pupils are equal, round, and  reactive to light. Right eye exhibits no discharge. Left eye exhibits no discharge. No scleral icterus.   Neck: Normal range of motion. Neck supple. No JVD present. No tracheal deviation present. No thyromegaly present.   Cardiovascular: Normal rate, regular rhythm, normal heart sounds and intact distal pulses.  Exam reveals no gallop and no friction rub.    No murmur heard.  Pulmonary/Chest: Effort normal and breath sounds normal. No stridor. No respiratory distress. She has no wheezes. She has no rales. She exhibits no tenderness.   Abdominal: Soft. Bowel sounds are normal. She exhibits no distension and no mass. There is no tenderness. There is no rebound and no guarding.   Musculoskeletal: Normal range of motion. She exhibits no edema or tenderness.   Lymphadenopathy:     She has no cervical adenopathy.   Neurological: She is alert and oriented to person, place, and time. No cranial nerve deficit. She exhibits normal muscle tone. Coordination normal.   Skin: Skin is warm and dry. No rash noted. She is not diaphoretic. No erythema.   Psychiatric: She has a normal mood and affect. Her behavior is normal. Judgment and thought content normal.   Nursing note and vitals reviewed.      Labs:        Invalid input(s): [id number]      Recent Labs      01/22/17   1700  01/23/17   1306  01/24/17   0303   SODIUM  135  135  134*   POTASSIUM  4.2  4.0  4.5   CHLORIDE  102  103  101   CO2  28  29  29   BUN  75*  41*  58*   CREATININE  3.54*  1.96*  2.49*   CALCIUM  7.3*  7.1*  7.3*     Recent Labs      01/22/17   1700  01/23/17   0322  01/23/17   1306  01/24/17   0303   PREALBUMIN   --   18.0   --    --    GLUCOSE  167*   --   160*  152*     Recent Labs      01/22/17   0011  01/22/17   0549  01/23/17   0322  01/24/17   0303   RBC  3.37*   --   2.92*  2.90*   HEMOGLOBIN  10.2*   --   8.8*  8.7*   HEMATOCRIT  30.9*   --   26.7*  26.8*   PLATELETCT  71*   --   57*  58*   IRON   --   27*  26*  32*   TOTIRONBC   --   153*  146*   161*     Recent Labs      17   0011  17   0322  17   0303   WBC  22.9*  18.1*  14.1*           Hemodynamics:  Temp (24hrs), Av.7 °C (98 °F), Min:36.4 °C (97.6 °F), Max:36.9 °C (98.5 °F)  Temperature: 36.4 °C (97.6 °F)  Pulse  Av.2  Min: 52  Max: 120   Blood Pressure : 143/64 mmHg     Respiratory:    Respiration: 18, Pulse Oximetry: 96 %     Work Of Breathing / Effort: Shallow  RUL Breath Sounds: Diminished, RML Breath Sounds: Diminished, RLL Breath Sounds: Diminished, SOURAV Breath Sounds: Diminished, LLL Breath Sounds: Diminished  Fluids:    Intake/Output Summary (Last 24 hours) at 17 1754  Last data filed at 17 0500   Gross per 24 hour   Intake   2317 ml   Output    275 ml   Net   2042 ml        GI/Nutrition:  Orders Placed This Encounter   Procedures   • DIET ORDER     Standing Status: Standing      Number of Occurrences: 1      Standing Expiration Date:      Order Specific Question:  Diet:     Answer:  Full Liquid [11]      Comments:  NO MEAL TRAYS, ONLY SNACKS     Order Specific Question:  Consistency/Fluid modifications:     Answer:  Nectar Thick [2]     Medical Decision Making, by Problem:  Active Hospital Problems    Diagnosis   • Glioblastoma (CMS-HCC) [C71.9]  Decreasing decadron per NS  keppra bid  Per oncology, start chemo oral as outpatient with Dr. Salomon  Start radiation 17   • Type 2 diabetes mellitus with stage 3 chronic kidney disease (CMS-HCC) [E11.22, N18.3]  SSI  accuchecks  cortrack tube feeds  Lantus    • HTN (hypertension) [I10]  Norvasc, labetalol prn, hydralazine prn  metoprolol   • Hyponatremia [E87.1]  resolved   • Protein-calorie malnutrition, severe (CMS-HCC) [E43]  Currently with tube feedings  SLP to follow   • Thrombocytopenia (CMS-HCC) [D69.6]  Unclear etiology  Monitor, stable at 58  No active bleeding signs     • Renal failure, acute on chronic (CMS-HCC) [N17.9, N18.9]  Stage 4 GFR 15-30   • Dyslipidemia [E78.5]  zocor daily    Iron  deficiency anemia:  Started on iron and vitamin C daily    Nausea:  Scopolamine patch  zofran prn  Phenergan prn    Leukocytosis:  2/2 decadron use  Decreasing as steroid tapers     DNR, PT/OT/SLP rec SNF.  Order placed, pending family choice.      Hillman catheter: No Hillman      DVT Prophylaxis: Contraindicated - High bleeding risk  DVT prophylaxis - mechanical: SCDs  Ulcer prophylaxis: Not indicated    Assessed for rehab: Patient was assess for and/or received rehabilitation services during this hospitalization

## 2017-01-25 NOTE — PROGRESS NOTES
Pt was up in chair today, hawkins was removed, brought over to shower room and given shower, linens changed, and back to bed, turned every two hours. Tube feeding bag and tubing changed, no c/o at this time, family in room

## 2017-01-25 NOTE — PROGRESS NOTES
Progress Note               Author: Mango Schuler Date & Time created: 2017  8:00 AM     Interval History:  POD#10 right crani for excision of mass (GBM)  Doing well.  No complaints.     Review of Systems:  Review of Systems   Eyes: Negative for blurred vision and double vision.   Neurological: Negative for sensory change, seizures and headaches.       Physical Exam:  Physical Exam   Constitutional: She is oriented to person, place, and time.   Eyes: EOM are normal. Pupils are equal, round, and reactive to light.   Neurological: She is alert and oriented to person, place, and time.   No drift. Moves bilateral upper and lower ext well.        Labs:        Invalid input(s): [id number]      Recent Labs      17   1700  17   1306  17   0303   SODIUM  135  135  134*   POTASSIUM  4.2  4.0  4.5   CHLORIDE  102  103  101   CO2  28  29  29   BUN  75*  41*  58*   CREATININE  3.54*  1.96*  2.49*   CALCIUM  7.3*  7.1*  7.3*     Recent Labs      17   1700  17   0322  17   1306  17   0303   PREALBUMIN   --   18.0   --    --    GLUCOSE  167*   --   160*  152*     Recent Labs      172  17   0303  17   0512   RBC  2.92*  2.90*  2.68*   HEMOGLOBIN  8.8*  8.7*  8.1*   HEMATOCRIT  26.7*  26.8*  24.5*   PLATELETCT  57*  58*  59*   IRON  26*  32*  40   TOTIRONBC  146*  161*  160*     Recent Labs      17   0303  17   0512   WBC  18.1*  14.1*  13.1*     Hemodynamics:  Temp (24hrs), Av.7 °C (98.1 °F), Min:36.4 °C (97.6 °F), Max:37.2 °C (98.9 °F)  Temperature: 36.6 °C (97.8 °F)  Pulse  Av.3  Min: 52  Max: 120   Blood Pressure : 152/63 mmHg     Respiratory:    Respiration: 18, Pulse Oximetry: 95 %        RUL Breath Sounds: Diminished, RML Breath Sounds: Diminished, RLL Breath Sounds: Diminished, SOURAV Breath Sounds: Diminished, LLL Breath Sounds: Diminished  Fluids:    Intake/Output Summary (Last 24 hours) at 17 0808  Last  data filed at 01/24/17 0500   Gross per 24 hour   Intake   2817 ml   Output   1775 ml   Net   1042 ml        GI/Nutrition:  Orders Placed This Encounter   Procedures   • DIET ORDER     Standing Status: Standing      Number of Occurrences: 1      Standing Expiration Date:      Order Specific Question:  Diet:     Answer:  Full Liquid [11]      Comments:  NO MEAL TRAYS, ONLY SNACKS     Order Specific Question:  Consistency/Fluid modifications:     Answer:  Nectar Thick [2]     Medical Decision Making, by Problem:  Active Hospital Problems    Diagnosis   • Glioblastoma (CMS-HCC) [C71.9]   • Type 2 diabetes mellitus with stage 3 chronic kidney disease (CMS-HCC) [E11.22, N18.3]   • HTN (hypertension) [I10]   • Hyponatremia [E87.1]   • Protein-calorie malnutrition, severe (CMS-HCC) [E43]   • Thrombocytopenia (CMS-HCC) [D69.6]   • Renal failure, acute on chronic (CMS-HCC) [N17.9, N18.9]   • Dyslipidemia [E78.5]       Plan:  Continue current therapies.  Decreasing steroids. To taper off today.  Staples out today.  Follow up with Dr. Ventura two weeks after discharge.   Core Measures

## 2017-01-25 NOTE — THERAPY
Physical Therapy note    Patient has been in dialysis since 9AM,  Plan for PT tx session tomorrow 1/26.  MUNIR Modi, PT pager [phone]

## 2017-01-25 NOTE — DISCHARGE PLANNING
Medical Social Work    Referral:  SNF    Intervention:  NEHEMIAH met with pt and her family at bedside to discuss the recommendation of SNF placement and to obtain SNF choice.  Pt's family would like to research and tour facilities prior to making a choice.  SW provided them with SNF brochures to review.  Pt's family asked about acute rehab.  Pt does not believe she would be able to tolerate acute rehab at this time.  SW informed them that pt can always have SNF place a referal to acute rehab once pt gets a little stronger; they expressed verbal understanding of this.    Pt's daughter reports that she has to leave for home on Sunday 1/29 and will not be able to be back in town until the following weekend. She would like to be with pt to help her settle into SNF.      Pt will need outpatient dialysis arranged prior to SNF placement.  SNF will likely need to know dialysis schedule prior to accepting pt.     NEHEMIAH submitted PASRR (PASRR#: [id number]).    Plan:  Await SNF choice by pt and family.  SS will remain available to provide support and assist as needed.

## 2017-01-25 NOTE — THERAPY
"Occupational Therapy Treatment completed with focus on ADLs, ADL transfers, patient education and cognition.  Functional Status:  Pt was seen for Occupational Therapy treatment today, see Therapy Kardex for details.  Treatment included education in breath control with activity and at rest, self pacing techs and energy conservation for pain management. Educated pt in safety awareness techs as well. Psychosocial intervention addressed. Reviewed fall prevention techs prior to activity. Pt demo confusion and poor recall of directions. Fair scanning demo today using both eyes crossing midline with most ADL tasks today. Pt demonstrated Mod A for UB dressing changing gown long sitting in bed; Max A for LB dressing seated base unable to manipulate reacher and pants with one hand. LUE weaker than Right. Pt demo Mod A for supine to sit at EOB and for scooting. Mod A for sit to stand and for BSC transfers to the right. RLE is stronger than Left. Left knee robi. Total A for full toilet hygiene standing. Pt unable to reach around arm rests of BSC. Min A for oral hygiene and to wash face seated base. Pt's niece present for half of OT session. Family training is on going. Stand pivot transfer with HHA not able to use FWW.  Pt was left up in a recliner chair, chair alarm on, call light in reach family present  and nursing is aware.  Continue Occupational Therapy services as per plan.    Plan of Care: Will benefit from Occupational Therapy 3 times per week  Discharge Recommendations:  Equipment Will Continue to Assess for Equipment Needs. Post-acute therapy recommended before discharged home.    See \"Rehab Therapy-Acute\" Patient Summary Report for complete documentation.   "

## 2017-01-25 NOTE — PROGRESS NOTES
Patient up to commode with two assist and continuous coaching. Family member at bedside. Patient was able to stand and pivot with two assist. Patient became unwilling to follow commands or participate with transfer back to bed. Patient to use bedpan only until further evaluated.

## 2017-01-26 PROBLEM — R11.0 NAUSEA: Status: ACTIVE | Noted: 2017-01-26

## 2017-01-26 PROBLEM — D50.9 IRON DEFICIENCY ANEMIA: Status: ACTIVE | Noted: 2017-01-26

## 2017-01-26 PROBLEM — R33.8 ACUTE URINARY RETENTION: Status: ACTIVE | Noted: 2017-01-26

## 2017-01-26 PROBLEM — R13.12 OROPHARYNGEAL DYSPHAGIA: Status: ACTIVE | Noted: 2017-01-26

## 2017-01-26 LAB
ABO GROUP BLD: NORMAL
ALBUMIN SERPL BCP-MCNC: 1.8 G/DL (ref 3.2–4.9)
BASOPHILS # BLD AUTO: 0.2 % (ref 0–1.8)
BASOPHILS # BLD: 0.02 K/UL (ref 0–0.12)
BLD GP AB SCN SERPL QL: NORMAL
BUN SERPL-MCNC: 43 MG/DL (ref 8–22)
CALCIUM SERPL-MCNC: 7.2 MG/DL (ref 8.5–10.5)
CHLORIDE SERPL-SCNC: 99 MMOL/L (ref 96–112)
CO2 SERPL-SCNC: 31 MMOL/L (ref 20–33)
COMPONENT R 8504R: NORMAL
CREAT SERPL-MCNC: 2.14 MG/DL (ref 0.5–1.4)
EOSINOPHIL # BLD AUTO: 0.19 K/UL (ref 0–0.51)
EOSINOPHIL NFR BLD: 2 % (ref 0–6.9)
ERYTHROCYTE [DISTWIDTH] IN BLOOD BY AUTOMATED COUNT: 50.7 FL (ref 35.9–50)
GFR SERPL CREATININE-BSD FRML MDRD: 23 ML/MIN/1.73 M 2
GLUCOSE BLD-MCNC: 105 MG/DL (ref 65–99)
GLUCOSE BLD-MCNC: 106 MG/DL (ref 65–99)
GLUCOSE BLD-MCNC: 151 MG/DL (ref 65–99)
GLUCOSE BLD-MCNC: 67 MG/DL (ref 65–99)
GLUCOSE BLD-MCNC: 86 MG/DL (ref 65–99)
GLUCOSE SERPL-MCNC: 156 MG/DL (ref 65–99)
HCT VFR BLD AUTO: 21.7 % (ref 37–47)
HGB BLD-MCNC: 7 G/DL (ref 12–16)
IMM GRANULOCYTES # BLD AUTO: 0.06 K/UL (ref 0–0.11)
IMM GRANULOCYTES NFR BLD AUTO: 0.6 % (ref 0–0.9)
LYMPHOCYTES # BLD AUTO: 0.72 K/UL (ref 1–4.8)
LYMPHOCYTES NFR BLD: 7.5 % (ref 22–41)
MCH RBC QN AUTO: 30.6 PG (ref 27–33)
MCHC RBC AUTO-ENTMCNC: 32.3 G/DL (ref 33.6–35)
MCV RBC AUTO: 94.8 FL (ref 81.4–97.8)
MONOCYTES # BLD AUTO: 0.52 K/UL (ref 0–0.85)
MONOCYTES NFR BLD AUTO: 5.4 % (ref 0–13.4)
NEUTROPHILS # BLD AUTO: 8.09 K/UL (ref 2–7.15)
NEUTROPHILS NFR BLD: 84.3 % (ref 44–72)
NRBC # BLD AUTO: 0 K/UL
NRBC BLD AUTO-RTO: 0 /100 WBC
PHOSPHATE SERPL-MCNC: 2 MG/DL (ref 2.5–4.5)
PLATELET # BLD AUTO: 55 K/UL (ref 164–446)
PMV BLD AUTO: 10.5 FL (ref 9–12.9)
POTASSIUM SERPL-SCNC: 4.4 MMOL/L (ref 3.6–5.5)
RBC # BLD AUTO: 2.29 M/UL (ref 4.2–5.4)
RH BLD: NORMAL
SODIUM SERPL-SCNC: 133 MMOL/L (ref 135–145)
WBC # BLD AUTO: 9.6 K/UL (ref 4.8–10.8)

## 2017-01-26 PROCEDURE — 86850 RBC ANTIBODY SCREEN: CPT

## 2017-01-26 PROCEDURE — 82962 GLUCOSE BLOOD TEST: CPT | Mod: 91

## 2017-01-26 PROCEDURE — 700102 HCHG RX REV CODE 250 W/ 637 OVERRIDE(OP): Performed by: HOSPITALIST

## 2017-01-26 PROCEDURE — A9270 NON-COVERED ITEM OR SERVICE: HCPCS | Performed by: HOSPITALIST

## 2017-01-26 PROCEDURE — 51798 US URINE CAPACITY MEASURE: CPT

## 2017-01-26 PROCEDURE — 36415 COLL VENOUS BLD VENIPUNCTURE: CPT

## 2017-01-26 PROCEDURE — A9270 NON-COVERED ITEM OR SERVICE: HCPCS | Performed by: PHYSICIAN ASSISTANT

## 2017-01-26 PROCEDURE — 86901 BLOOD TYPING SEROLOGIC RH(D): CPT

## 2017-01-26 PROCEDURE — 700102 HCHG RX REV CODE 250 W/ 637 OVERRIDE(OP): Performed by: PHYSICIAN ASSISTANT

## 2017-01-26 PROCEDURE — A9270 NON-COVERED ITEM OR SERVICE: HCPCS

## 2017-01-26 PROCEDURE — 85025 COMPLETE CBC W/AUTO DIFF WBC: CPT

## 2017-01-26 PROCEDURE — 86900 BLOOD TYPING SEROLOGIC ABO: CPT

## 2017-01-26 PROCEDURE — 700102 HCHG RX REV CODE 250 W/ 637 OVERRIDE(OP)

## 2017-01-26 PROCEDURE — 770009 HCHG ROOM/CARE - ONCOLOGY SEMI PRI*

## 2017-01-26 PROCEDURE — A9270 NON-COVERED ITEM OR SERVICE: HCPCS | Performed by: PHARMACIST

## 2017-01-26 PROCEDURE — 80069 RENAL FUNCTION PANEL: CPT | Mod: 91

## 2017-01-26 PROCEDURE — 99233 SBSQ HOSP IP/OBS HIGH 50: CPT | Performed by: HOSPITALIST

## 2017-01-26 PROCEDURE — 700112 HCHG RX REV CODE 229: Performed by: HOSPITALIST

## 2017-01-26 PROCEDURE — 700102 HCHG RX REV CODE 250 W/ 637 OVERRIDE(OP): Performed by: PHARMACIST

## 2017-01-26 PROCEDURE — 85027 COMPLETE CBC AUTOMATED: CPT

## 2017-01-26 RX ORDER — SIMVASTATIN 10 MG
10 TABLET ORAL EVERY EVENING
Qty: 30 TAB | Refills: 11 | Status: SHIPPED | DISCHARGE
Start: 2017-01-26

## 2017-01-26 RX ORDER — METOPROLOL TARTRATE 50 MG/1
50 TABLET, FILM COATED ORAL 2 TIMES DAILY
Qty: 60 TAB | Status: SHIPPED | DISCHARGE
Start: 2017-01-26 | End: 2017-01-27

## 2017-01-26 RX ORDER — AMLODIPINE BESYLATE 5 MG/1
5 TABLET ORAL
Status: DISCONTINUED | OUTPATIENT
Start: 2017-01-27 | End: 2017-01-26

## 2017-01-26 RX ORDER — HYDRALAZINE HYDROCHLORIDE 25 MG/1
25 TABLET, FILM COATED ORAL EVERY 8 HOURS PRN
Status: SHIPPED | DISCHARGE
Start: 2017-01-26 | End: 2017-01-27

## 2017-01-26 RX ORDER — ACETAMINOPHEN 325 MG/1
650 TABLET ORAL EVERY 4 HOURS PRN
Qty: 30 TAB | Refills: 0 | Status: SHIPPED | DISCHARGE
Start: 2017-01-26

## 2017-01-26 RX ORDER — ASCORBIC ACID 500 MG
500 TABLET ORAL DAILY
Qty: 30 TAB | Refills: 3 | Status: SHIPPED | DISCHARGE
Start: 2017-01-26

## 2017-01-26 RX ORDER — SCOLOPAMINE TRANSDERMAL SYSTEM 1 MG/1
1 PATCH, EXTENDED RELEASE TRANSDERMAL
Qty: 4 PATCH | Refills: 3 | Status: SHIPPED | DISCHARGE
Start: 2017-01-26 | End: 2017-02-04

## 2017-01-26 RX ORDER — HEPARIN SODIUM 1000 [USP'U]/ML
3700 INJECTION, SOLUTION INTRAVENOUS; SUBCUTANEOUS PRN
Refills: 0 | Status: SHIPPED | DISCHARGE
Start: 2017-01-26 | End: 2017-02-04

## 2017-01-26 RX ORDER — INSULIN GLARGINE 100 [IU]/ML
20 INJECTION, SOLUTION SUBCUTANEOUS EVERY EVENING
Qty: 10 ML | Status: SHIPPED | DISCHARGE
Start: 2017-01-26

## 2017-01-26 RX ORDER — ALPRAZOLAM 0.25 MG/1
0.25 TABLET ORAL NIGHTLY PRN
Qty: 30 TAB | Refills: 0 | Status: SHIPPED | DISCHARGE
Start: 2017-01-26 | End: 2017-02-04

## 2017-01-26 RX ORDER — MICONAZOLE NITRATE 20 MG/G
1 CREAM TOPICAL 2 TIMES DAILY
Status: SHIPPED | DISCHARGE
Start: 2017-01-26 | End: 2017-02-04

## 2017-01-26 RX ORDER — PROMETHAZINE HYDROCHLORIDE 25 MG/1
25 TABLET ORAL EVERY 6 HOURS PRN
Qty: 30 TAB | Refills: 0 | Status: SHIPPED | DISCHARGE
Start: 2017-01-26 | End: 2017-02-04

## 2017-01-26 RX ORDER — MORPHINE SULFATE 15 MG/1
15 TABLET ORAL EVERY 6 HOURS PRN
Qty: 30 TAB | Refills: 0 | Status: SHIPPED | DISCHARGE
Start: 2017-01-26 | End: 2017-02-04

## 2017-01-26 RX ORDER — ALENDRONATE SODIUM 35 MG/1
35 TABLET ORAL
Status: SHIPPED | DISCHARGE
Start: 2017-01-26

## 2017-01-26 RX ORDER — ONDANSETRON 4 MG/1
4 TABLET, ORALLY DISINTEGRATING ORAL EVERY 4 HOURS PRN
Qty: 10 TAB | Refills: 0 | Status: SHIPPED | DISCHARGE
Start: 2017-01-26 | End: 2017-02-04

## 2017-01-26 RX ORDER — LEVETIRACETAM 100 MG/ML
500 SOLUTION ORAL EVERY 12 HOURS
Qty: 240 ML | Status: SHIPPED | DISCHARGE
Start: 2017-01-26

## 2017-01-26 RX ORDER — DEXAMETHASONE 1 MG
1 TABLET ORAL 2 TIMES DAILY
Qty: 30 TAB | Refills: 0 | Status: SHIPPED | DISCHARGE
Start: 2017-01-26

## 2017-01-26 RX ORDER — DOXAZOSIN MESYLATE 1 MG/1
1 TABLET ORAL
Qty: 30 TAB | Status: SHIPPED | DISCHARGE
Start: 2017-01-26

## 2017-01-26 RX ORDER — FERROUS SULFATE 325(65) MG
325 TABLET ORAL
Qty: 30 TAB | Status: SHIPPED | DISCHARGE
Start: 2017-01-26

## 2017-01-26 RX ADMIN — SIMVASTATIN 10 MG: 20 TABLET, FILM COATED ORAL at 21:45

## 2017-01-26 RX ADMIN — LEVETIRACETAM 500 MG: 100 SOLUTION ORAL at 08:45

## 2017-01-26 RX ADMIN — DOCUSATE SODIUM 100 MG: 100 CAPSULE ORAL at 08:46

## 2017-01-26 RX ADMIN — LEVETIRACETAM 500 MG: 100 SOLUTION ORAL at 21:44

## 2017-01-26 RX ADMIN — Medication 16 G: at 11:46

## 2017-01-26 RX ADMIN — OXYCODONE HYDROCHLORIDE AND ACETAMINOPHEN 500 MG: 500 TABLET ORAL at 08:45

## 2017-01-26 RX ADMIN — HYDRALAZINE HYDROCHLORIDE 50 MG: 50 TABLET ORAL at 14:35

## 2017-01-26 RX ADMIN — DEXAMETHASONE 1 MG: 4 TABLET ORAL at 21:45

## 2017-01-26 RX ADMIN — DOXAZOSIN MESYLATE 1 MG: 1 TABLET ORAL at 21:45

## 2017-01-26 RX ADMIN — DEXAMETHASONE 1 MG: 4 TABLET ORAL at 08:46

## 2017-01-26 RX ADMIN — Medication 1 TABLET: at 21:45

## 2017-01-26 RX ADMIN — METOPROLOL TARTRATE 25 MG: 50 TABLET, FILM COATED ORAL at 08:46

## 2017-01-26 RX ADMIN — AMLODIPINE BESYLATE 10 MG: 10 TABLET ORAL at 08:46

## 2017-01-26 RX ADMIN — METOPROLOL TARTRATE 25 MG: 25 TABLET, FILM COATED ORAL at 21:45

## 2017-01-26 RX ADMIN — HYDRALAZINE HYDROCHLORIDE 50 MG: 50 TABLET ORAL at 05:25

## 2017-01-26 RX ADMIN — MICONAZOLE NITRATE: 20 CREAM TOPICAL at 09:00

## 2017-01-26 RX ADMIN — Medication 325 MG: at 08:46

## 2017-01-26 ASSESSMENT — ENCOUNTER SYMPTOMS
FOCAL WEAKNESS: 0
EYE PAIN: 0
DIZZINESS: 0
MYALGIAS: 0
MUSCULOSKELETAL NEGATIVE: 1
NECK PAIN: 0
NAUSEA: 0
CLAUDICATION: 0
WEAKNESS: 0
WEAKNESS: 1
PALPITATIONS: 0
SHORTNESS OF BREATH: 0
BRUISES/BLEEDS EASILY: 1
VOMITING: 0
BACK PAIN: 0
EYE DISCHARGE: 0
CHILLS: 0
RESPIRATORY NEGATIVE: 1
CARDIOVASCULAR NEGATIVE: 1
EYES NEGATIVE: 1
SPUTUM PRODUCTION: 0
FEVER: 0
SORE THROAT: 0
DIAPHORESIS: 0
CONSTIPATION: 1
DIARRHEA: 0
ABDOMINAL PAIN: 0
SENSORY CHANGE: 0
LOSS OF CONSCIOUSNESS: 0
COUGH: 0
HEMOPTYSIS: 0
HEADACHES: 0
WHEEZING: 0
SPEECH CHANGE: 0
BRUISES/BLEEDS EASILY: 0
CONSTIPATION: 0
DEPRESSION: 0

## 2017-01-26 ASSESSMENT — LIFESTYLE VARIABLES: SUBSTANCE_ABUSE: 0

## 2017-01-26 ASSESSMENT — PATIENT HEALTH QUESTIONNAIRE - PHQ9
SUM OF ALL RESPONSES TO PHQ QUESTIONS 1-9: 0
SUM OF ALL RESPONSES TO PHQ9 QUESTIONS 1 AND 2: 0
2. FEELING DOWN, DEPRESSED, IRRITABLE, OR HOPELESS: NOT AT ALL
1. LITTLE INTEREST OR PLEASURE IN DOING THINGS: NOT AT ALL

## 2017-01-26 ASSESSMENT — PAIN SCALES - GENERAL
PAINLEVEL_OUTOF10: 0
PAINLEVEL_OUTOF10: 0

## 2017-01-26 NOTE — DISCHARGE PLANNING
Medical Social Work    SW completed COBRA and transfer packet (awaiting d/c summary).  Pt, family, hospitalist and bedside RN are aware of transfer time.  SW placed transfer packet on pt's chart and will fax d/c summary to [facility] once available.    1:25-  NEHEMIAH was informed by Hospitalist RN that pt will need to remain in the hospital to get a blood transfusion during her inpatient dialysis tomorrow.  NEHEMIAH met with pt and her family at bedside to inform.  NEHEMIAH called [facility] and spoke with [employer] to cancel transport.  NEHEMIAH called [facility] and spoke with Dee and informed her that pt is not able to transfer today.  NEHEMIAH called and spoke with Queenie dialysis liaison to provide an update; she will cancel chair for tomorrow at [facility].      Plan:  Pt is now anticipated to be able to d/c to [facility] tomorrow.

## 2017-01-26 NOTE — DISCHARGE PLANNING
Medical Social Work    Ongoing: SNF Placement    SW was informed by dialysis liaison, Queenie, that pt is set up at [facility] at 1045 and that they are ready to start pt tomorrow.  SW provided hospitalist with an update.  Hospitlist informed this SW that pt will start radiation in February.  Pt has an appointment on February 6th for mapping.  At this time it is undetermined how long or frequently pt will require radiation.  NEHEMIAH called [facility] and spoke with Dee to make sure that they are aware that pt will start radiation and to provided appointment information scheduled for February 6th.  Dee informed this SW that they are still able to accept pt today.  NEHEMIAH informed Dee that pt will require [facility] transport to SNF and that SW will request a  time of 2:00pm; Dee stated that 2:00 will work for them.  SW completed transport forms and faxed to [facility] to arrange transportation to SNF.  Pt and her family are agreeable to SNF transfer today.    Plan:  Await [facility] transport time.  SS to complete transfer packet/ COBRA.

## 2017-01-26 NOTE — PROGRESS NOTES
Pt slept soundly entire night, bladder scan at 0000 149ml, bladder scan again at 0500 136ml. Pt still has yet to void on own. Staples removed at 0515.

## 2017-01-26 NOTE — DISCHARGE PLANNING
Per NEHEMIAH, pt has been accepted to [facility] and can DC today once dialysis chair is arranged.  TC to Dee at [facility] to determine what days and times they are able to accommodate transporting pt to and from dialysis.  TC to pt's nirosalba Decker to obtain dialysis clinic choice; pt's insurance only covers [facility] clinics.  [facility] only has late PM chair times available, and [facility] only has early AM chair times available.  TC to Tamiko at [facility], and they have a MWF 1045 available.  Pt's niece is agreeable to this clinic.  TC to Jack at [facility] to confirm that they can accommodate transport for this time.  Pt's first OP tx will be Friday, 1/27/17 with a check-in time of 1015.  LM for NEHEMIAH Sanchez to notify.

## 2017-01-26 NOTE — PROGRESS NOTES
Hospital Medicine Progress Note, Adult, Complex               Author: Kathleen Young Date & Time created: 1/25/2017  7:06 PM     Interval History:  CC:  HA/N/V  1/7: admitted 71yoF with HTN, HLD, DM2, CKD presented with HA/N/V.  Head CT with new intracranial mass, IV decadron given, pathology revealing GBM.  1/24:  Per oncology, will need outpatient initiation of oral chemotherapy with Dr. Salomon, radiation to start 2/6/17.  Hillman catheter removed today, staples in place.  Daughter and  at bedside, gave update.  SNF order placed today, no choice yet per family.  1/25:  Seen in HD.  Staples out per NS.  Bladder scan with residual urine 480.  Added cardura 1mg via NG daily.  NGT remains.    Review of Systems:  Review of Systems   Constitutional: Negative for fever, chills, malaise/fatigue and diaphoresis.   HENT: Negative for congestion and sore throat.    Eyes: Negative for pain and discharge.   Respiratory: Negative for cough, hemoptysis, sputum production, shortness of breath and wheezing.    Cardiovascular: Negative for chest pain, palpitations, claudication and leg swelling.   Gastrointestinal: Negative for nausea, vomiting, abdominal pain, diarrhea, constipation and melena.   Genitourinary: Negative for dysuria, urgency and frequency.   Musculoskeletal: Negative for myalgias, back pain, joint pain and neck pain.   Skin: Negative for itching and rash.   Neurological: Negative for dizziness, sensory change, speech change, focal weakness, loss of consciousness, weakness and headaches.   Endo/Heme/Allergies: Does not bruise/bleed easily.   Psychiatric/Behavioral: Negative for depression, suicidal ideas and substance abuse.       Physical Exam:  Physical Exam   Constitutional: She is oriented to person, place, and time. She appears well-developed and well-nourished. No distress.   HENT:   Head: Normocephalic and atraumatic.   Nose: Nose normal.   Mouth/Throat: Oropharynx is clear and moist. No oropharyngeal  exudate.   Rt sided scalp incision CD&I staples out.   Eyes: Conjunctivae and EOM are normal. Pupils are equal, round, and reactive to light. Right eye exhibits no discharge. Left eye exhibits no discharge. No scleral icterus.   Neck: Normal range of motion. Neck supple. No JVD present. No tracheal deviation present. No thyromegaly present.   Cardiovascular: Normal rate, regular rhythm, normal heart sounds and intact distal pulses.  Exam reveals no gallop and no friction rub.    No murmur heard.  Pulmonary/Chest: Effort normal and breath sounds normal. No stridor. No respiratory distress. She has no wheezes. She has no rales. She exhibits no tenderness.   Abdominal: Soft. Bowel sounds are normal. She exhibits no distension and no mass. There is no tenderness. There is no rebound and no guarding.   Musculoskeletal: Normal range of motion. She exhibits no edema or tenderness.   Lymphadenopathy:     She has no cervical adenopathy.   Neurological: She is alert and oriented to person, place, and time. No cranial nerve deficit. She exhibits normal muscle tone. Coordination normal.   Skin: Skin is warm and dry. No rash noted. She is not diaphoretic. No erythema.   Psychiatric: She has a normal mood and affect. Her behavior is normal. Judgment and thought content normal.   Nursing note and vitals reviewed.      Labs:        Invalid input(s): [id number]      Recent Labs      01/23/17   1306  01/24/17   0303   SODIUM  135  134*   POTASSIUM  4.0  4.5   CHLORIDE  103  101   CO2  29  29   BUN  41*  58*   CREATININE  1.96*  2.49*   CALCIUM  7.1*  7.3*     Recent Labs      01/23/17   0322  01/23/17   1306  01/24/17   0303   PREALBUMIN  18.0   --    --    GLUCOSE   --   160*  152*     Recent Labs      01/23/17   0322  01/24/17   0303  01/25/17   0512   RBC  2.92*  2.90*  2.68*   HEMOGLOBIN  8.8*  8.7*  8.1*   HEMATOCRIT  26.7*  26.8*  24.5*   PLATELETCT  57*  58*  59*   IRON  26*  32*  40   TOTIRONBC  146*  161*  160*      Recent Labs      17   0322  17   0303  17   0512   WBC  18.1*  14.1*  13.1*           Hemodynamics:  Temp (24hrs), Av.9 °C (98.4 °F), Min:36.6 °C (97.8 °F), Max:37.3 °C (99.1 °F)  Temperature: 36.7 °C (98.1 °F)  Pulse  Av.4  Min: 52  Max: 120   Blood Pressure : 131/63 mmHg     Respiratory:    Respiration: 18, Pulse Oximetry: 94 %        RUL Breath Sounds: Diminished, RML Breath Sounds: Diminished, RLL Breath Sounds: Diminished, SOURAV Breath Sounds: Diminished, LLL Breath Sounds: Diminished  Fluids:    Intake/Output Summary (Last 24 hours) at 17 1906  Last data filed at 17 1753   Gross per 24 hour   Intake   5195 ml   Output   2600 ml   Net   2595 ml        GI/Nutrition:  Orders Placed This Encounter   Procedures   • DIET ORDER     Standing Status: Standing      Number of Occurrences: 1      Standing Expiration Date:      Order Specific Question:  Diet:     Answer:  Full Liquid [11]      Comments:  NO MEAL TRAYS, ONLY SNACKS     Order Specific Question:  Consistency/Fluid modifications:     Answer:  Nectar Thick [2]     Medical Decision Making, by Problem:  Active Hospital Problems    Diagnosis   • Glioblastoma (CMS-HCC) [C71.9]  DC'd decadron taper per NS  keppra bid  Per oncology, start chemo oral as outpatient with Dr. Salomon  Start radiation 17   • Type 2 diabetes mellitus with stage 3 chronic kidney disease (CMS-HCC) [E11.22, N18.3]  SSI  accuchecks  cortrack tube feeds  Lantus    • HTN (hypertension) [I10]  Norvasc, labetalol prn, hydralazine prn  metoprolol   • Hyponatremia [E87.1]  resolved   • Protein-calorie malnutrition, severe (CMS-HCC) [E43]  Currently with tube feedings  SLP to follow   • Thrombocytopenia (CMS-HCC) [D69.6]  Unclear etiology  Monitor, stable at 58  No active bleeding signs     • Renal failure, acute on chronic (CMS-HCC) [N17.9, N18.9]  Stage 4 GFR 15-30  HD MWF      • Dyslipidemia [E78.5]  zocor daily    Iron deficiency anemia:  Started on iron  and vitamin C daily    Nausea:  Scopolamine patch  zofran prn  Phenergan prn    Leukocytosis:  2/2 decadron use  Decreasing as steroid tapers    Acute urinary retention:  cardura added qhs  Straight cath prn bladder scan >300 residual.     DNR, PT/OT/SLP rec SNF.  Order placed, pending family choice.  Will need HD schedule.      Hillman catheter: No Hillman      DVT Prophylaxis: Contraindicated - High bleeding risk  DVT prophylaxis - mechanical: SCDs  Ulcer prophylaxis: Not indicated    Assessed for rehab: Patient was assess for and/or received rehabilitation services during this hospitalization

## 2017-01-26 NOTE — DISCHARGE PLANNING
Medical Social Work    Discussed in IDT rounds this morning.  Pt has been accepted to [facility], but they will need to know pt's dialysis schedule.  Per hospitalist pt is medically cleared for SNF transfer.  NEHEMIAH called and spoke with dialysis liaison, Queenie Corona, to provide an update on d/c plan.  Queenie will meet with pt and her family today to obtain dialysis choice and to work on setting pt up with an outpatient dialysis chair.     Plan:  Pt is anticipated to d/c to [facility] once dialysis chair is set up.  SS will remain available to provide support and assist with d/c planning as needed.

## 2017-01-26 NOTE — PROGRESS NOTES
Pt bladder scanned w/ 488 mLs of urine.  Pt straight catheterized, 600 mLs of clear yellow urine out.  Pt tolerated procedure well.

## 2017-01-26 NOTE — PROGRESS NOTES
Hospital Medicine Progress Note, Adult, Complex               Author: Michael Date & Time created: 1/26/2017  11:09 AM     Interval History:  71 year old female with PMHx DM type 2, CKD who presented with headache and N/V and was found to have large right temporal intracranial mass on CT head ---> MRI brain reveals large right temporal lobe mass with extensive vasogenic edema most consistent with glioblastoma multiforme. There is R>L midline shift.  Consult to manage ANNABEL on CKD - patient has repeatedly said as outpatient - under no circumstances would she accept dialysis either temporarily or permanently.  HOWEVER - she has recanted that statement to me at this point.    DAILY NEPHROLOGY SUMMARY  1/08/17 - seen in consultation by Dr Chua.  ANNABEL felt likely due to pre-renal azotemia from poor intake w Nausea/headaches  1/09/17 - renal function not improved - no fluids given overnight - initiating x 1L  1/10/17 - Cr dropped from 5.17 to 5.09. K: 5.2. Nearly euvolemic. Better appetite. No indication to start HD now.    1/11/17 - MR done again on Khari 10. Nephro team double checked with Radiology office at 4492, the MR was done WITHOUT contrast. No HD today even her Cr is going up. No subjective uremic complaints. Near euvolemic status.    1/12/17 - HD catheter done at right IJ. No complication noted.    1/13/17 - Elevated Bun/Cr/K. No much subjective uremic symptoms. No symptoms from high K (5.7) 1st HD on Jan 13.    1/14/17 - s/p HD #1 yesterday without complication  Running again today in preparation for surgery tomorrow  1/15/17 - tolerated HD yesterday. NeuroSugery on Khari 15.    1/16/17 - Slow response to my interview. No much subjective complaints. Mild drowsiness noted. Bun/Cr from 1/15: 84/4.8 to 90/4.8. K at 5.3. No HD on 01/16.  1/17/17 - HD done smoothly in the morning for high Bun/Cr and low Na/high K. UF 2L. Low PLT at 85, trending down from 200K. No thrombosis or bleeding found.     1/18/17 - PLT  stable at 89. Bun/Cr: 66/4. K4.7, Na 136. Will do another HD today to improve Na, reduce K/Bun/Cr.    1/19/17 -PLT stable at 85. Bun/Cr 57/3.3. Better consciousness level. Better left side muscle power. Will arrange [facility] for her.    1/20/17- HD today as her schedule. Waiting for perm cath from IR team. Urine output 375 in past 24 hours.    1/21/17  -  S/p Hemodialysis treatment on 1/20/2017 initiated on ,Net UF: 2200 ml.   S/p Perm-cath placement, Good UOP , holding HD for now, apears on the dry side, no IV fluid and NPO, we will start gentle fluid hydration.   1/22/17 -  The patient is not responding to fluid challenge, Cr is trending up off HD, D/C IV fluid, HD in Am.  1/23/17 - pt had decreased urine output overnight  1/24/17 - HD yesterday 1 liter UF, pt is weak and requires signficant assistantce  1/26/17 - HD yesterday without complaints-requiring straight cath      Review of Systems:  Review of Systems   HENT: Negative.    Eyes: Negative.    Respiratory: Negative.    Cardiovascular: Negative.    Gastrointestinal: Positive for constipation.   Genitourinary:        Requires straight cath   Musculoskeletal: Negative.    Skin: Negative.    Neurological: Positive for weakness.   Endo/Heme/Allergies: Bruises/bleeds easily.   Psychiatric/Behavioral:        Depressed affect       Physical Exam:  Physical Exam   Constitutional: She is oriented to person, place, and time. She appears well-developed and well-nourished.   HENT:   Head: Normocephalic and atraumatic.   Neck:   RIJ tunnel cath   Cardiovascular: Normal rate and regular rhythm.    Pulmonary/Chest: Effort normal.   Abdominal: Soft. Bowel sounds are normal.   Musculoskeletal: She exhibits no edema.   Neurological: She is alert and oriented to person, place, and time.   Skin: Skin is warm.   echymosis at neck around catheter   Nursing note and vitals reviewed.      Labs:        Invalid input(s): [id number]      Recent Labs      01/23/17   1306  01/24/17    0303  01/26/17   0001   SODIUM  135  134*  133*   POTASSIUM  4.0  4.5  4.4   CHLORIDE  103  101  99   CO2  29  29  31   BUN  41*  58*  43*   CREATININE  1.96*  2.49*  2.14*   PHOSPHORUS   --    --   2.0*   CALCIUM  7.1*  7.3*  7.2*     Recent Labs      17   1306  17   0303  17   0001   GLUCOSE  160*  152*  156*     Recent Labs      17   0303  17   0512  17   0346   RBC  2.90*  2.68*  2.29*   HEMOGLOBIN  8.7*  8.1*  7.0*   HEMATOCRIT  26.8*  24.5*  21.7*   PLATELETCT  58*  59*  55*   IRON  32*  40   --    TOTIRONBC  161*  160*   --      Recent Labs      17   0303  01/25/17   0517   0346   WBC  14.1*  13.1*  9.6   NEUTSPOLYS   --    --   84.30*   LYMPHOCYTES   --    --   7.50*   MONOCYTES   --    --   5.40   EOSINOPHILS   --    --   2.00   BASOPHILS   --    --   0.20           Hemodynamics:  Temp (24hrs), Av.8 °C (98.3 °F), Min:36.7 °C (98.1 °F), Max:37 °C (98.6 °F)  Temperature: 37 °C (98.6 °F)  Pulse  Av.6  Min: 52  Max: 120   Blood Pressure : 112/40 mmHg     Respiratory:    Respiration: 18, Pulse Oximetry: 94 %     Work Of Breathing / Effort: Shallow  RUL Breath Sounds: Clear, RML Breath Sounds: Diminished, RLL Breath Sounds: Diminished, SOURAV Breath Sounds: Diminished, LLL Breath Sounds: Diminished  Fluids:    Intake/Output Summary (Last 24 hours) at 17 1109  Last data filed at 17 0500   Gross per 24 hour   Intake   1000 ml   Output   2600 ml   Net  -1600 ml        GI/Nutrition:  Orders Placed This Encounter   Procedures   • DIET ORDER     Standing Status: Standing      Number of Occurrences: 1      Standing Expiration Date:      Order Specific Question:  Diet:     Answer:  Full Liquid [11]      Comments:  NO MEAL TRAYS, ONLY SNACKS     Order Specific Question:  Consistency/Fluid modifications:     Answer:  Nectar Thick [2]     Medical Decision Making, by Problem:  Active Hospital Problems    Diagnosis   • Glioblastoma (CMS-HCC) [C71.9]   •  Type 2 diabetes mellitus with stage 3 chronic kidney disease (CMS-HCC) [E11.22, N18.3]   • HTN (hypertension) [I10]   • Hyponatremia [E87.1]   • Protein-calorie malnutrition, severe (CMS-HCC) [E43]   • Thrombocytopenia (CMS-HCC) [D69.6]   • Renal failure, acute on chronic (CMS-HCC) [N17.9, N18.9]   • Dyslipidemia [E78.5]     Assessment/Plan:  # Chronic kidney disease patient with new ESRD  -- HD MWF      # Intracranial Mass  --Neurosurgery on 1/15/17  --Per NeuroSurgery's note, tapering of steroid started.    --Oncology also following    # HTN, systolic around 150.    --Continue home BP meds  --On amlodipine, hydralazine, metoprolol now.     # DM II--management per primary svc    # Anemia    # PLT stable at 80-90K. No thrombosis/bleeding found.     Plan  HD in am  Pt can be transfused in am with HD if primary team wants        Medications reviewed and Labs reviewed

## 2017-01-26 NOTE — CARE PLAN
Problem: Pain Management  Goal: Pain level will decrease to patient’s comfort goal  Outcome: PROGRESSING AS EXPECTED  Pt verbalizes pain is controlled    Problem: Safety  Goal: Will remain free from injury  Outcome: PROGRESSING AS EXPECTED  Pt uses call light, family also at bedside

## 2017-01-26 NOTE — PROGRESS NOTES
Pt aaox4. DNR. Flat Affect. Incision to head c/d/i with edges approximated. Cortrack with bridle to right nare with diabetisource at 50 ml/hr.  No residuals. May have full liquid nectar thick snacks, 1:1 observation.  Crush meds and give per laura.  Right SC dialysis cath. Dialysis MWF. Will receive blood while in dialysis tomorrow then discharge to [facility]. Plans for op chemo/radiation to begin February 6.  Room air. Prn meds to keep bp below 160/100. None required today, scheduled meds keep bp under control. Max assist. PT/OT ordered. Pt has oliguria. Bladder scan q 6 hours. Bladder retention has been below 100 last couple of scans. No iv access, despite having tried ultra sound previously. Accuchecks ACHS, lunch was low and glucose tabs given with satisfactory results. No c/o pain at this time

## 2017-01-26 NOTE — DISCHARGE INSTRUCTIONS
Discharge Instructions    Discharged to other by ambulance with escort. Discharged via ambulance, hospital escort: Yes.  Special equipment needed: Not Applicable    Be sure to schedule a follow-up appointment with your primary care doctor or any specialists as instructed.     Discharge Plan:   Diet Plan: Discussed  Activity Level: Discussed  Confirmed Follow up Appointment: Patient to Call and Schedule Appointment  Confirmed Symptoms Management: Discussed  Medication Reconciliation Updated: Yes  Influenza Vaccine Indication: Not indicated: Previously immunized this influenza season and > 8 years of age    I understand that a diet low in cholesterol, fat, and sodium is recommended for good health. Unless I have been given specific instructions below for another diet, I accept this instruction as my diet prescription.   Other diet: diabetisource @ 50        Special Instructions: None    · Is patient discharged on Warfarin / Coumadin?   No     · Is patient Post Blood Transfusion?  Yes  POST BLOOD TRANSFUSION INFORMATION (ADULT)    The purpose of blood transfusion is to correct anemia, low levels of blood clotting factors or to correct acute blood loss.   Blood transfusion is very safe but occasionally unexpected adverse reactions do occur. Most adverse reactions occur during transfusion, within one to two days following transfusion or one to two weeks following transfusion. Some adverse reactions can occur one to six months after transfusion and some even years later.             If any of the symptoms listed below happen to you during your transfusion,                                 please notify your nurse immediately.   · Fever or Chills  · Flushing of the Face  · Hives, rash or itching  · Difficulty in breathing or shortness of breath  · Pain or oozing of blood from the IV needle site  · Low back pain  · Nausea or vomiting  · Weakness or fainting  · Chest pain  · Blood in the urine  · Decreased frequency of  urination    The above symptoms may also occur within 24 to 48 after transfusion; if so, notify your physician.     · Yellowing of the skin    This can happen one to six months after transfusion; if so, notify your physician    Depression / Suicide Risk    As you are discharged from this [facility] facility, it is important to learn how to keep safe from harming yourself.    Recognize the warning signs:  · Abrupt changes in personality, positive or negative- including increase in energy   · Giving away possessions  · Change in eating patterns- significant weight changes-  positive or negative  · Change in sleeping patterns- unable to sleep or sleeping all the time   · Unwillingness or inability to communicate  · Depression  · Unusual sadness, discouragement and loneliness  · Talk of wanting to die  · Neglect of personal appearance   · Rebelliousness- reckless behavior  · Withdrawal from people/activities they love  · Confusion- inability to concentrate     If you or a loved one observes any of these behaviors or has concerns about self-harm, here's what you can do:  · Talk about it- your feelings and reasons for harming yourself  · Remove any means that you might use to hurt yourself (examples: pills, rope, extension cords, firearm)  · Get professional help from the community (Mental Health, Substance Abuse, psychological counseling)  · Do not be alone:Call your Safe Contact- someone whom you trust who will be there for you.  · Call your local CRISIS HOTLINE 850-9071 or 307-502-7713  · Call your local Children's Mobile Crisis Response Team [place] (800) 947-9147 or www.[employer]  · Call the toll free National Suicide Prevention Hotlines   · National Suicide Prevention Lifeline 145-601-GZER (6784)  · National Hope Line Network 800-SUICIDE (297-6732)

## 2017-01-26 NOTE — DISCHARGE PLANNING
Received transport forms from NEHEMIAH Sanchez at 1145. Contacted [facility] spoke to Beverley for auth will call back. Transportation arranged with Joe at [facility] to [facility] at 1400. Notified Dee with [facility] via phone and [facility]HEMIA[facility] Sanchez via voicemail.

## 2017-01-26 NOTE — DISCHARGE PLANNING
Per [facility] Sanchez, pt needs a blood transfusion and will now DC tomorrow.  LM for Suman Uriarte to notify.  Pt's first OP tx will now be Saturday, 1/28/17 with a check-in time of 1015.

## 2017-01-27 ENCOUNTER — RESOLUTE PROFESSIONAL BILLING HOSPITAL PROF FEE (OUTPATIENT)
Dept: HOSPITALIST | Facility: SKILLED NURSING FACILITY | Age: 72
End: 2017-01-27
Payer: MEDICARE

## 2017-01-27 ENCOUNTER — HOSPITAL ENCOUNTER (OUTPATIENT)
Dept: LAB | Facility: MEDICAL CENTER | Age: 72
End: 2017-01-27
Attending: INTERNAL MEDICINE
Payer: COMMERCIAL

## 2017-01-27 VITALS
TEMPERATURE: 97.6 F | BODY MASS INDEX: 28.84 KG/M2 | RESPIRATION RATE: 18 BRPM | HEART RATE: 86 BPM | HEIGHT: 62 IN | WEIGHT: 156.75 LBS | DIASTOLIC BLOOD PRESSURE: 72 MMHG | OXYGEN SATURATION: 96 % | SYSTOLIC BLOOD PRESSURE: 136 MMHG

## 2017-01-27 PROBLEM — Z99.2 ESRD (END STAGE RENAL DISEASE) ON DIALYSIS (HCC): Status: ACTIVE | Noted: 2017-01-27

## 2017-01-27 PROBLEM — N18.6 ESRD (END STAGE RENAL DISEASE) ON DIALYSIS (HCC): Status: ACTIVE | Noted: 2017-01-27

## 2017-01-27 LAB
ALBUMIN SERPL BCP-MCNC: 2 G/DL (ref 3.2–4.9)
BUN SERPL-MCNC: 63 MG/DL (ref 8–22)
CALCIUM SERPL-MCNC: 7.4 MG/DL (ref 8.5–10.5)
CHLORIDE SERPL-SCNC: 97 MMOL/L (ref 96–112)
CO2 SERPL-SCNC: 31 MMOL/L (ref 20–33)
CREAT SERPL-MCNC: 2.72 MG/DL (ref 0.5–1.4)
ERYTHROCYTE [DISTWIDTH] IN BLOOD BY AUTOMATED COUNT: 50.9 FL (ref 35.9–50)
GFR SERPL CREATININE-BSD FRML MDRD: 17 ML/MIN/1.73 M 2
GLUCOSE BLD-MCNC: 118 MG/DL (ref 65–99)
GLUCOSE BLD-MCNC: 124 MG/DL (ref 65–99)
GLUCOSE BLD-MCNC: 193 MG/DL (ref 65–99)
GLUCOSE SERPL-MCNC: 122 MG/DL (ref 65–99)
HCT VFR BLD AUTO: 21.3 % (ref 37–47)
HGB BLD-MCNC: 6.8 G/DL (ref 12–16)
MCH RBC QN AUTO: 30.6 PG (ref 27–33)
MCHC RBC AUTO-ENTMCNC: 31.9 G/DL (ref 33.6–35)
MCV RBC AUTO: 95.9 FL (ref 81.4–97.8)
PHOSPHATE SERPL-MCNC: 2.9 MG/DL (ref 2.5–4.5)
PLATELET # BLD AUTO: 61 K/UL (ref 164–446)
PMV BLD AUTO: 11.1 FL (ref 9–12.9)
POTASSIUM SERPL-SCNC: 5.2 MMOL/L (ref 3.6–5.5)
RBC # BLD AUTO: 2.22 M/UL (ref 4.2–5.4)
SODIUM SERPL-SCNC: 133 MMOL/L (ref 135–145)
WBC # BLD AUTO: 10.5 K/UL (ref 4.8–10.8)

## 2017-01-27 PROCEDURE — 86923 COMPATIBILITY TEST ELECTRIC: CPT

## 2017-01-27 PROCEDURE — 82962 GLUCOSE BLOOD TEST: CPT

## 2017-01-27 PROCEDURE — 99239 HOSP IP/OBS DSCHRG MGMT >30: CPT | Performed by: HOSPITALIST

## 2017-01-27 PROCEDURE — A9270 NON-COVERED ITEM OR SERVICE: HCPCS | Performed by: PHARMACIST

## 2017-01-27 PROCEDURE — 700102 HCHG RX REV CODE 250 W/ 637 OVERRIDE(OP): Performed by: HOSPITALIST

## 2017-01-27 PROCEDURE — 700102 HCHG RX REV CODE 250 W/ 637 OVERRIDE(OP): Performed by: PHARMACIST

## 2017-01-27 PROCEDURE — 51798 US URINE CAPACITY MEASURE: CPT

## 2017-01-27 PROCEDURE — 700111 HCHG RX REV CODE 636 W/ 250 OVERRIDE (IP)

## 2017-01-27 PROCEDURE — A9270 NON-COVERED ITEM OR SERVICE: HCPCS | Performed by: HOSPITALIST

## 2017-01-27 PROCEDURE — 30233N1 TRANSFUSION OF NONAUTOLOGOUS RED BLOOD CELLS INTO PERIPHERAL VEIN, PERCUTANEOUS APPROACH: ICD-10-PCS | Performed by: HOSPITALIST

## 2017-01-27 PROCEDURE — P9016 RBC LEUKOCYTES REDUCED: HCPCS

## 2017-01-27 PROCEDURE — 99306 1ST NF CARE HIGH MDM 50: CPT | Mod: AI | Performed by: INTERNAL MEDICINE

## 2017-01-27 PROCEDURE — 90935 HEMODIALYSIS ONE EVALUATION: CPT

## 2017-01-27 PROCEDURE — 36430 TRANSFUSION BLD/BLD COMPNT: CPT

## 2017-01-27 RX ORDER — HEPARIN SODIUM 1000 [USP'U]/ML
INJECTION, SOLUTION INTRAVENOUS; SUBCUTANEOUS
Status: COMPLETED
Start: 2017-01-27 | End: 2017-01-27

## 2017-01-27 RX ADMIN — OXYCODONE HYDROCHLORIDE AND ACETAMINOPHEN 500 MG: 500 TABLET ORAL at 13:01

## 2017-01-27 RX ADMIN — HEPARIN SODIUM: 1000 INJECTION, SOLUTION INTRAVENOUS; SUBCUTANEOUS at 11:44

## 2017-01-27 RX ADMIN — LEVETIRACETAM 500 MG: 100 SOLUTION ORAL at 13:03

## 2017-01-27 RX ADMIN — Medication 325 MG: at 13:01

## 2017-01-27 RX ADMIN — METOPROLOL TARTRATE 25 MG: 25 TABLET, FILM COATED ORAL at 13:01

## 2017-01-27 RX ADMIN — MICONAZOLE NITRATE: 20 CREAM TOPICAL at 13:01

## 2017-01-27 ASSESSMENT — PAIN SCALES - GENERAL: PAINLEVEL_OUTOF10: 0

## 2017-01-27 NOTE — DISCHARGE PLANNING
Per [facility] Laura, spoke with Dee [facility] they will accept patient in transfer today.  Patient to transfer today at 1400 via [facility] to [facility]. Insurance Auth [id number]. [facility] Laura has been advised of transport time and need for Discharge Summary. [facility] aware of transport time.

## 2017-01-27 NOTE — DISCHARGE PLANNING
Medical Social Work    Ongoing: SNF Transfer    Pt is medically cleared for SNF this afternoon after she receives dialysis and blood transfusion.  COBRA and transfer packet are already completed.  SW will place d/c summary in packet once available.  Pt will need to transfer to SNF via [facility].  SW completed transport forms and faxed to [facility] to arrange [facility] transport to [facility] with a requested transport time of 2:00pm.  [facility] auth number for [facility] transport is [id number].    Plan:  Await [facility] transport time.  Pt to transfer to [facility] today.

## 2017-01-27 NOTE — DISCHARGE PLANNING
Medical Social Work    SW received draft of d/c summary form transcription and faxed it to [facility].  [facility] transport has been pushed back to 3:00pm-- SW informed pt, family and bedside RN.    SW placed d/c summary in transfer packet.    Plan:  Pt to transfer to [facility] via [facility] at 3:00 this afternoon.

## 2017-01-27 NOTE — DISCHARGE SUMMARY
PRIMARY CARE PROVIDER:  Omer Park MD    Follow up with PCP once discharged from acute rehab within 2 weeks.  The   patient is to follow up with Dr. Ventura, neurosurgery, in 2 weeks.    Appointment should be made prior to discharge.  Follow up with Dr. Martinez   for radiation oncology, 02/06/2017 for mapping.  Follow up with Dr. Salomon in   5-7 days for consideration of outpatient Temodar treatment as well as   evaluation of thrombocytopenia.  Discharged to skilled nursing facility today   with hemodialysis to continue Monday, Wednesday, Friday through [facility] right   chest.  Cortrak feedings to continue with Diabetisource.  Continue with   speech language pathology 3 times a week, PT/OT 3 times a week.    CODE STATUS:  Patient is a do not resuscitate.    ALLERGIES:  CODEINE, DILAUDID, PERCOCET, PERCODAN, ULTRAM, AND VICODIN.    DISCHARGE MEDICATIONS:  Acetaminophen 650 mg per NG tube every 4 hours as   needed for mild pain or fever, Xanax 0.25 mg per NG tube at bedtime as needed   for anxiety, ascorbic acid 500 mg via NG daily, dexamethasone 1 mg via NG tube   2 times daily to continue until seen by Dr. Martinez for radiation treatment,   doxazosin 1 mg per NG tube at bedtime, ferrous sulfate 325 mg per NG tube   daily, insulin Lantus 20 units subQ at bedtime, she remains on her regular   Humalog sliding scale insulin 3-14 units every 6 hours depending on glucose   level, Keppra 500 mg per NG every 12 hours, metoprolol 25 mg per NG 2 times   daily, Miconazole 2% zinc oxide topical cream 1 application to area 2 times   daily, I believe, is to her groin; morphine sulfate 15 mg per NG every 6 hours   as needed for pain, Zofran 4 mg per NG every 4 hours as needed for nausea and   vomiting, promethazine 25 mg per NG every 6 hours as needed for nausea and   vomiting if Zofran is not keeping nausea at bay, scopolamine 1.5 mg 1 patch to   skin every 72 hours, for nausea, may consider discontinuing this  medication   since nausea is much better controlled; alendronate 35 mg per NG every 7 days,   and simvastatin 10 mg per NG every evening.    DISCHARGE DIAGNOSES:  1.  Newly diagnosed glioblastoma, status post resection, right craniotomy by   Dr. Ventura.  Followup is in 2 weeks.  Staples are removed.  Per Dr. Silverman,   oncology, continue with Decadron until Dr. Martinez for radiation therapy   treatment.  Follow up with Dr. Salomon in 5-7 days for consideration of oral   chemotherapy treatment.  2.  Diabetes mellitus type 2 with chronic kidney disease.  Sliding scale   insulin, Accu-Cheks, Cortrak tube feeds, Diabetisource, Lantus at bedtime.  3.  Hypertension.  Currently, she is hypotensive.  I have discontinued the   patient's Norvasc and hydralazine.  She will remain on Cardura as well as   metoprolol lower dose.  May need to further decrease depending on her blood   pressure.  4.  Hyponatremia, resolved.  5.  Protein calorie malnutrition, severe.  Continue with tube feedings.  6.  Thrombocytopenia, unclear etiology, remains stable at 50-60s.  No active   bleeding signs.  No deep vein thrombosis prophylaxis other than sequential   compression devices due to low platelets.  7.  Acute renal failure resulting in end-stage renal disease, on hemodialysis   Monday, Wednesday, and Friday, to continue, followed by Dr. Rodriguez.  8.  Dyslipidemia.  Continue with Zocor daily.  9.  Iron deficiency anemia.  Continue with iron and vitamin C daily.  Patient   was transfused with 1 unit packed red blood cells during hemodialysis on   01/27/2017 prior to discharge.  No other active bleeding is seen.  10.  Nausea.  Scopolamine patch, Zofran, Phenergan.  May consider decreasing   some of these modalities since her nausea seems to be better improved.  11.  Leukocytosis secondary to Decadron use, also resolving.  White count 10.5   at discharge.  12.  Acute urinary retention.  Patient has a history of urinary retention.    She was  restarted on Cardura at bedtime with bladder scans less than 100 for 2   occurrences.  Hillman out on 01/25/2017.    HOSPITAL COURSE:  This 71-year-old female with history of hypertension,   hyperlipidemia, diabetes mellitus 2, chronic kidney disease stage III,   presented with headache, nausea, vomiting.  Head CT showed new intracranial   mass.  IV Decadron given.  She underwent a craniotomy with Dr. Ventura   showing a glioblastoma.  She was continued on Decadron IV.  She is set up to   have radiation therapy with Dr. Martinez on 02/06/2017, will also need   followup with Dr. Salomon for consideration of chemotherapy treatment.  Although   platelets remain low despite the Decadron, may need further workup.  The   patient was able to tolerate removal of Hillman catheter with Cardura, would   continue Cardura and she has history of urinary retention even prior to   admission.  She remains with a nasogastric tube, Cortrak.  She is slowly   improving with speech therapy, last evaluation on 01/24/2017.  Recommended   speech therapy 5 times per week for speech as well as 3 times a week for   dysphagia.    RECOMMENDATIONS:  On 01/24/2017 was to continue with Cortrak.  Recommend   snacks only, next nectar-thick full liquid, but no meals, one-to-one   supervision and aspiration precautions at that time.  Continue with physical   therapy as well as occupational therapy 3 times per week.  The patient's   mentation slowly improving.  Speech also improving at time of discharge.  She   did receive 1 unit of packed red blood cells for hemoglobin down to 6.8 prior   to discharge to skilled nursing facility, but as mentioned, there is no active   bleeding seen.  She remains on iron.  I would not give any chemoprophylaxis   for DVT because of her low platelets and anemia as well as recent craniotomy.    She should continue with sequential compression devices only.    Discharge time is 60 minutes.        ____________________________________     MD SUSAN Ledezma / ISA    DD:  01/27/2017 12:47:48  DT:  01/27/2017 13:36:05    D#:  [id number]  Job#:  [id number]    cc: Devin Martinez MD, BALDEV CAI MD, JV FARRIS MD, Bry Ventura MD

## 2017-01-27 NOTE — DISCHARGE PLANNING
Received [facility] auth for transport [id number], auth is good for one [facility]  transport until the 1/29/17. Notified NEHEMIAH Sanchez via phone.

## 2017-01-27 NOTE — PROGRESS NOTES
"Pt A&Ox3, disoriented to time. VS: /45 mmHg  Pulse 84  Temp(Src) 36.7 °C (98.1 °F)  Resp 20  Ht 1.575 m (5' 2\")  Wt 71.1 kg (156 lb 12 oz)  BMI 28.66 kg/m2  SpO2 96%  Breastfeeding? No. Pt denies pain, n/v, numbness, tingling, SOB and chest pain. Cortrak to right nare with diabetisource at 50 cc/hr. Family at bedside. Pt had a smear. Right head incision CDI. Pt needs met at this time, call light within reach, hourly rounding in effect, and will continue to monitor.       "

## 2017-01-27 NOTE — DISCHARGE PLANNING
Per [facility] Laura request, transport has been re-scheduled for 1500 to [facility] via Dee KC at [facility] advised.  [facility] Laura advised.

## 2017-01-27 NOTE — PROGRESS NOTES
[facility] Progress Note    Hemodialysis treatment ordered today per Dr. Rodriguez x 3 hours. Treatment initiated at 0934, ended at 1234.     Patient tolerated tx; see paper flow sheet for details. Patient received 1 unit of PRBC during HD. She tolerated the infusion; no s/s of adverse reaction.     Net UF 2120 mL.     Post tx, CVC flushed with saline then locked with heparin 1000 units/mL per designated amount in each wing then clamped and capped. Aspirate heparin prior to next CVC use.    Report given to Primary RN.

## 2017-01-27 NOTE — PROGRESS NOTES
Discharge paperwork discussed with pt and family. All questions answered. Report given to [facility]. Pt left with YAMINI via [email or web address].

## 2017-01-27 NOTE — PROGRESS NOTES
PRBC to be transfused with dialysis and released. Dialysis called to determine time, awaiting transport.

## 2017-01-27 NOTE — PROGRESS NOTES
Pt had HD today and was seen   labs/meds and vitals were reviewed  see run sheet for full details  Pt received 1 UPRBC

## 2017-01-27 NOTE — PROGRESS NOTES
Hospital Medicine Progress Note, Adult, Complex               Author: Kathleen Young Date & Time created: 1/26/2017  6:52 PM     Interval History:  CC:  HA/N/V  1/7: admitted 71yoF with HTN, HLD, DM2, CKD presented with HA/N/V.  Head CT with new intracranial mass, IV decadron given, pathology revealing GBM.  1/24:  Per oncology, will need outpatient initiation of oral chemotherapy with Dr. Salomon, radiation to start 2/6/17.  Hillman catheter removed today, staples in place.  Daughter and  at bedside, gave update.  SNF order placed today, no choice yet per family.  1/25:  Seen in HD.  Staples out per NS.  Bladder scan with residual urine 480.  Added cardura 1mg via NG daily.  NGT remains.  1/26:   Doing better, mentation improved.  Cortrack remains, but allowed to have snacks NTFL 1:1 supervision.  HG drop 8 to 7.  Dr. Silverman wants to hold transfer to SNF for blood transfusion to be given 1/27 with HD.  Bladder residuals <100.      Review of Systems:  Review of Systems   Constitutional: Negative for fever, chills, malaise/fatigue and diaphoresis.   HENT: Negative for congestion and sore throat.    Eyes: Negative for pain and discharge.   Respiratory: Negative for cough, hemoptysis, sputum production, shortness of breath and wheezing.    Cardiovascular: Negative for chest pain, palpitations, claudication and leg swelling.   Gastrointestinal: Negative for nausea, vomiting, abdominal pain, diarrhea, constipation and melena.   Genitourinary: Negative for dysuria, urgency and frequency.   Musculoskeletal: Negative for myalgias, back pain, joint pain and neck pain.   Skin: Negative for itching and rash.   Neurological: Negative for dizziness, sensory change, speech change, focal weakness, loss of consciousness, weakness and headaches.   Endo/Heme/Allergies: Does not bruise/bleed easily.   Psychiatric/Behavioral: Negative for depression, suicidal ideas and substance abuse.       Physical Exam:  Physical Exam    Constitutional: She is oriented to person, place, and time. She appears well-developed and well-nourished. No distress.   HENT:   Head: Normocephalic and atraumatic.   Nose: Nose normal.   Mouth/Throat: Oropharynx is clear and moist. No oropharyngeal exudate.   Rt sided scalp incision CD&I staples out.   Eyes: Conjunctivae and EOM are normal. Pupils are equal, round, and reactive to light. Right eye exhibits no discharge. Left eye exhibits no discharge. No scleral icterus.   Neck: Normal range of motion. Neck supple. No JVD present. No tracheal deviation present. No thyromegaly present.   Cardiovascular: Normal rate, regular rhythm, normal heart sounds and intact distal pulses.  Exam reveals no gallop and no friction rub.    No murmur heard.  Pulmonary/Chest: Effort normal and breath sounds normal. No stridor. No respiratory distress. She has no wheezes. She has no rales. She exhibits no tenderness.   Abdominal: Soft. Bowel sounds are normal. She exhibits no distension and no mass. There is no tenderness. There is no rebound and no guarding.   Musculoskeletal: Normal range of motion. She exhibits no edema or tenderness.   Lymphadenopathy:     She has no cervical adenopathy.   Neurological: She is alert and oriented to person, place, and time. No cranial nerve deficit. She exhibits normal muscle tone. Coordination normal.   Skin: Skin is warm and dry. No rash noted. She is not diaphoretic. No erythema.   Psychiatric: She has a normal mood and affect. Her behavior is normal. Judgment and thought content normal.   Nursing note and vitals reviewed.      Labs:        Invalid input(s): [id number]      Recent Labs      01/24/17   0303  01/26/17   0001   SODIUM  134*  133*   POTASSIUM  4.5  4.4   CHLORIDE  101  99   CO2  29  31   BUN  58*  43*   CREATININE  2.49*  2.14*   PHOSPHORUS   --   2.0*   CALCIUM  7.3*  7.2*     Recent Labs      01/24/17   0303  01/26/17   0001   GLUCOSE  152*  156*     Recent Labs       17   0303  17   0512  17   0346   RBC  2.90*  2.68*  2.29*   HEMOGLOBIN  8.7*  8.1*  7.0*   HEMATOCRIT  26.8*  24.5*  21.7*   PLATELETCT  58*  59*  55*   IRON  32*  40   --    TOTIRONBC  161*  160*   --      Recent Labs      17   0303  17   0512  17   0346   WBC  14.1*  13.1*  9.6   NEUTSPOLYS   --    --   84.30*   LYMPHOCYTES   --    --   7.50*   MONOCYTES   --    --   5.40   EOSINOPHILS   --    --   2.00   BASOPHILS   --    --   0.20           Hemodynamics:  Temp (24hrs), Av.9 °C (98.5 °F), Min:36.8 °C (98.3 °F), Max:37.1 °C (98.8 °F)  Temperature: 37.1 °C (98.8 °F)  Pulse  Av.7  Min: 52  Max: 120   Blood Pressure : 131/44 mmHg     Respiratory:    Respiration: 16, Pulse Oximetry: 90 %     Work Of Breathing / Effort: Shallow  RUL Breath Sounds: Clear, RML Breath Sounds: Diminished, RLL Breath Sounds: Diminished, SOURAV Breath Sounds: Diminished, LLL Breath Sounds: Diminished  Fluids:    Intake/Output Summary (Last 24 hours) at 17 1852  Last data filed at 17 1700   Gross per 24 hour   Intake   1074 ml   Output      0 ml   Net   1074 ml        GI/Nutrition:  Orders Placed This Encounter   Procedures   • DIET ORDER     Standing Status: Standing      Number of Occurrences: 1      Standing Expiration Date:      Order Specific Question:  Diet:     Answer:  Full Liquid [11]      Comments:  NO MEAL TRAYS, ONLY SNACKS     Order Specific Question:  Consistency/Fluid modifications:     Answer:  Nectar Thick [2]     Medical Decision Making, by Problem:  Active Hospital Problems    Diagnosis   • Glioblastoma (CMS-HCC) [C71.9]  DC'd decadron taper per NS  keppra bid  Per oncology, start chemo oral as outpatient with Dr. Salomon in 5-7 days  Start radiation 17.     • Type 2 diabetes mellitus with stage 3 chronic kidney disease (CMS-HCC) [E11.22, N18.3]  SSI  accuchecks  cortrack tube feeds  Lantus    • HTN (hypertension) [I10]  Now hypotensive  dc'd norvasc, labetalol prn,  hydralazine prn  DC hydralazine  Added cardura for urinary retention 1/25.  metoprolol   • Hyponatremia [E87.1]  resolved   • Protein-calorie malnutrition, severe (CMS-HCC) [E43]  Currently with tube feedings  SLP to follow   • Thrombocytopenia (CMS-HCC) [D69.6]  Unclear etiology  Monitor, stable at 58  No active bleeding signs     • Renal failure, acute on chronic (CMS-HCC) [N17.9, N18.9]  Stage 4 GFR 15-30  HD MWF      • Dyslipidemia [E78.5]  zocor daily    Iron deficiency anemia:  Started on iron and vitamin C daily  Drop in HG  Transfuse 1 unit PRBCs with HD 1/27.    Nausea:  Scopolamine patch  zofran prn  Phenergan prn    Leukocytosis:  2/2 decadron use  Decreasing as steroid tapers    Acute urinary retention:  cardura added qhs   bladder scan >100 x 2 occurrences  Hillman out 1/25.     DNR, PT/OT/SLP rec SNF.  Order place, dc to SNF 1/27 after HD and blood transfusion, 1 unit PRBC ordered, to be given via Permcath during HD.      Hillman catheter: No Hillman      DVT Prophylaxis: Contraindicated - High bleeding risk  DVT prophylaxis - mechanical: SCDs  Ulcer prophylaxis: Not indicated    Assessed for rehab: Patient was assess for and/or received rehabilitation services during this hospitalization

## 2017-01-27 NOTE — THERAPY
Attempted to see pt for Occupational Therapy treatment today. RN/SW informed OT that pt is discharging to SNF,  now awaiting transport. OT treatment withheld. Recommend continued OT services to maximize function, safety and strength to assist with self care tasks and ADL transfers. RN informed and agreed .

## 2017-01-28 ENCOUNTER — HOSPITAL ENCOUNTER (OUTPATIENT)
Dept: LAB | Facility: MEDICAL CENTER | Age: 72
End: 2017-01-28
Attending: INTERNAL MEDICINE
Payer: COMMERCIAL

## 2017-01-28 PROCEDURE — 99309 SBSQ NF CARE MODERATE MDM 30: CPT | Performed by: PHYSICIAN ASSISTANT

## 2017-01-29 PROCEDURE — 99310 SBSQ NF CARE HIGH MDM 45: CPT | Performed by: PHYSICIAN ASSISTANT

## 2017-01-30 LAB
APPEARANCE UR: ABNORMAL
BACTERIA #/AREA URNS HPF: ABNORMAL /HPF
BILIRUB UR QL STRIP.AUTO: NEGATIVE
COLOR UR AUTO: ABNORMAL
CULTURE IF INDICATED INDCX: YES UA CULTURE
GLUCOSE UR STRIP.AUTO-MCNC: NEGATIVE MG/DL
KETONES UR STRIP.AUTO-MCNC: NEGATIVE MG/DL
LEUKOCYTE ESTERASE UR QL STRIP.AUTO: ABNORMAL
MICRO URNS: ABNORMAL
NITRITE UR QL STRIP.AUTO: NEGATIVE
PH UR: 8 [PH]
PROT UR QL STRIP: 200 MG/DL
RBC #/AREA URNS HPF: ABNORMAL /HPF
RBC UR QL AUTO: ABNORMAL
SP GR UR STRIP.AUTO: 1.02
WBC #/AREA URNS HPF: >150 /HPF

## 2017-02-01 LAB
BACTERIA UR CULT: NORMAL
SIGNIFICANT IND 70042: NORMAL
SOURCE SOURCE: NORMAL

## 2017-02-02 ENCOUNTER — APPOINTMENT (OUTPATIENT)
Dept: RADIOLOGY | Facility: IMAGING CENTER | Age: 72
End: 2017-02-02
Attending: INTERNAL MEDICINE
Payer: MEDICARE

## 2017-02-02 DIAGNOSIS — R05.9 COUGH: ICD-10-CM

## 2017-02-02 LAB
APPEARANCE UR: ABNORMAL
BACTERIA #/AREA URNS HPF: ABNORMAL /HPF
BILIRUB UR QL STRIP.AUTO: NEGATIVE
COLOR UR AUTO: YELLOW
CULTURE IF INDICATED INDCX: YES UA CULTURE
GLUCOSE UR STRIP.AUTO-MCNC: NEGATIVE MG/DL
KETONES UR STRIP.AUTO-MCNC: NEGATIVE MG/DL
LEUKOCYTE ESTERASE UR QL STRIP.AUTO: ABNORMAL
MICRO URNS: ABNORMAL
MUCOUS THREADS URNS QL MICRO: ABNORMAL /HPF
NITRITE UR QL STRIP.AUTO: NEGATIVE
PH UR: 8.5 [PH]
PROT UR QL STRIP: 300 MG/DL
RBC #/AREA URNS HPF: ABNORMAL /HPF
RBC UR QL AUTO: NEGATIVE
SP GR UR STRIP.AUTO: 1.01
WBC #/AREA URNS HPF: ABNORMAL /HPF

## 2017-02-03 LAB
ALBUMIN SERPL BCP-MCNC: 2.3 G/DL (ref 3.2–4.9)
ALBUMIN/GLOB SERPL: 1.2 G/DL
ALP SERPL-CCNC: 118 U/L (ref 30–99)
ALT SERPL-CCNC: 11 U/L (ref 2–50)
ANION GAP SERPL CALC-SCNC: 6 MMOL/L (ref 0–11.9)
AST SERPL-CCNC: 13 U/L (ref 12–45)
BASOPHILS # BLD AUTO: 0.03 K/UL (ref 0–0.12)
BASOPHILS NFR BLD AUTO: 0.4 % (ref 0–1.8)
BILIRUB SERPL-MCNC: 0.3 MG/DL (ref 0.1–1.5)
BNP SERPL-MCNC: 68 PG/ML (ref 0–100)
BUN SERPL-MCNC: 53 MG/DL (ref 8–22)
CALCIUM SERPL-MCNC: 7.9 MG/DL (ref 8.5–10.5)
CHLORIDE SERPL-SCNC: 97 MMOL/L (ref 96–112)
CO2 SERPL-SCNC: 31 MMOL/L (ref 20–33)
CREAT SERPL-MCNC: 2.13 MG/DL (ref 0.5–1.4)
EOSINOPHIL # BLD: 0.08 K/UL (ref 0–0.51)
EOSINOPHIL NFR BLD AUTO: 1.1 % (ref 0–6.9)
ERYTHROCYTE [DISTWIDTH] IN BLOOD BY AUTOMATED COUNT: 48.4 FL (ref 35.9–50)
GLOBULIN SER CALC-MCNC: 2 G/DL (ref 1.9–3.5)
GLUCOSE SERPL-MCNC: 175 MG/DL (ref 65–99)
HCT VFR BLD AUTO: 24.7 % (ref 37–47)
HGB BLD-MCNC: 8.1 G/DL (ref 12–16)
IMM GRANULOCYTES # BLD AUTO: 0.24 K/UL (ref 0–0.11)
IMM GRANULOCYTES NFR BLD AUTO: 3.2 % (ref 0–0.9)
LYMPHOCYTES # BLD: 1.05 K/UL (ref 1–4.8)
LYMPHOCYTES NFR BLD AUTO: 13.9 % (ref 22–41)
MCH RBC QN AUTO: 30.9 PG (ref 27–33)
MCHC RBC AUTO-ENTMCNC: 32.8 G/DL (ref 33.6–35)
MCV RBC AUTO: 94.3 FL (ref 81.4–97.8)
MONOCYTES # BLD: 0.73 K/UL (ref 0–0.85)
MONOCYTES NFR BLD AUTO: 9.7 % (ref 0–13.4)
NEUTROPHILS # BLD: 5.42 K/UL (ref 2–7.15)
NEUTROPHILS NFR BLD AUTO: 71.7 % (ref 44–72)
NRBC # BLD AUTO: 0 K/UL
NRBC BLD-RTO: 0 /100 WBC
PLATELET # BLD AUTO: 272 K/UL (ref 164–446)
PMV BLD AUTO: 9.9 FL (ref 9–12.9)
POTASSIUM SERPL-SCNC: 3.9 MMOL/L (ref 3.6–5.5)
PROT SERPL-MCNC: 4.3 G/DL (ref 6–8.2)
RBC # BLD AUTO: 2.62 M/UL (ref 4.2–5.4)
SODIUM SERPL-SCNC: 134 MMOL/L (ref 135–145)
WBC # BLD AUTO: 7.6 K/UL (ref 4.8–10.8)

## 2017-02-04 ENCOUNTER — APPOINTMENT (OUTPATIENT)
Dept: RADIOLOGY | Facility: MEDICAL CENTER | Age: 72
DRG: 054 | End: 2017-02-04
Attending: EMERGENCY MEDICINE
Payer: MEDICARE

## 2017-02-04 ENCOUNTER — APPOINTMENT (OUTPATIENT)
Dept: RADIOLOGY | Facility: MEDICAL CENTER | Age: 72
DRG: 054 | End: 2017-02-04
Attending: STUDENT IN AN ORGANIZED HEALTH CARE EDUCATION/TRAINING PROGRAM
Payer: MEDICARE

## 2017-02-04 ENCOUNTER — RESOLUTE PROFESSIONAL BILLING HOSPITAL PROF FEE (OUTPATIENT)
Dept: MEDSURG UNIT | Facility: MEDICAL CENTER | Age: 72
End: 2017-02-04
Payer: MEDICARE

## 2017-02-04 ENCOUNTER — HOSPITAL ENCOUNTER (INPATIENT)
Facility: MEDICAL CENTER | Age: 72
LOS: 2 days | DRG: 054 | End: 2017-02-06
Attending: EMERGENCY MEDICINE | Admitting: INTERNAL MEDICINE
Payer: MEDICARE

## 2017-02-04 DIAGNOSIS — R40.0 SOMNOLENCE: ICD-10-CM

## 2017-02-04 DIAGNOSIS — T17.908A ASPIRATION INTO AIRWAY, INITIAL ENCOUNTER: ICD-10-CM

## 2017-02-04 DIAGNOSIS — Z99.2 ESRD (END STAGE RENAL DISEASE) ON DIALYSIS (HCC): ICD-10-CM

## 2017-02-04 DIAGNOSIS — C71.9 GBM (GLIOBLASTOMA MULTIFORME) (HCC): ICD-10-CM

## 2017-02-04 DIAGNOSIS — N18.6 ESRD (END STAGE RENAL DISEASE) ON DIALYSIS (HCC): ICD-10-CM

## 2017-02-04 DIAGNOSIS — Z66 DNR (DO NOT RESUSCITATE): ICD-10-CM

## 2017-02-04 PROBLEM — R41.82 ALTERED MENTAL STATUS: Status: ACTIVE | Noted: 2017-02-04

## 2017-02-04 LAB
ALBUMIN SERPL BCP-MCNC: 2.5 G/DL (ref 3.2–4.9)
ALBUMIN/GLOB SERPL: 1.2 G/DL
ALP SERPL-CCNC: 128 U/L (ref 30–99)
ALT SERPL-CCNC: 18 U/L (ref 2–50)
ANION GAP SERPL CALC-SCNC: 7 MMOL/L (ref 0–11.9)
APPEARANCE UR: ABNORMAL
APTT PPP: 21.6 SEC (ref 24.7–36)
AST SERPL-CCNC: 16 U/L (ref 12–45)
BACTERIA #/AREA URNS HPF: ABNORMAL /HPF
BASO STIPL BLD QL SMEAR: NORMAL
BASOPHILS # BLD AUTO: 0.9 % (ref 0–1.8)
BASOPHILS # BLD: 0.09 K/UL (ref 0–0.12)
BILIRUB SERPL-MCNC: 0.3 MG/DL (ref 0.1–1.5)
BILIRUB UR QL STRIP.AUTO: NEGATIVE
BLOOD CULTURE HOLD CXBCH: NORMAL
BUN SERPL-MCNC: 36 MG/DL (ref 8–22)
CALCIUM SERPL-MCNC: 8.2 MG/DL (ref 8.5–10.5)
CHLORIDE SERPL-SCNC: 99 MMOL/L (ref 96–112)
CO2 SERPL-SCNC: 31 MMOL/L (ref 20–33)
COLOR UR: ABNORMAL
CREAT SERPL-MCNC: 1.8 MG/DL (ref 0.5–1.4)
CULTURE IF INDICATED INDCX: YES UA CULTURE
EKG IMPRESSION: NORMAL
EOSINOPHIL # BLD AUTO: 0.09 K/UL (ref 0–0.51)
EOSINOPHIL NFR BLD: 0.9 % (ref 0–6.9)
ERYTHROCYTE [DISTWIDTH] IN BLOOD BY AUTOMATED COUNT: 49.3 FL (ref 35.9–50)
GFR SERPL CREATININE-BSD FRML MDRD: 28 ML/MIN/1.73 M 2
GLOBULIN SER CALC-MCNC: 2.1 G/DL (ref 1.9–3.5)
GLUCOSE BLD-MCNC: 213 MG/DL (ref 65–99)
GLUCOSE SERPL-MCNC: 199 MG/DL (ref 65–99)
GLUCOSE UR STRIP.AUTO-MCNC: ABNORMAL MG/DL
HCT VFR BLD AUTO: 25.3 % (ref 37–47)
HGB BLD-MCNC: 8 G/DL (ref 12–16)
HYPOCHROMIA BLD QL SMEAR: ABNORMAL
INR PPP: 1.06 (ref 0.87–1.13)
KETONES UR STRIP.AUTO-MCNC: NEGATIVE MG/DL
LEUKOCYTE ESTERASE UR QL STRIP.AUTO: ABNORMAL
LYMPHOCYTES # BLD AUTO: 0.66 K/UL (ref 1–4.8)
LYMPHOCYTES NFR BLD: 6.9 % (ref 22–41)
MAGNESIUM SERPL-MCNC: 2 MG/DL (ref 1.5–2.5)
MANUAL DIFF BLD: NORMAL
MCH RBC QN AUTO: 30.5 PG (ref 27–33)
MCHC RBC AUTO-ENTMCNC: 31.6 G/DL (ref 33.6–35)
MCV RBC AUTO: 96.6 FL (ref 81.4–97.8)
METAMYELOCYTES NFR BLD MANUAL: 1.7 %
MICRO URNS: ABNORMAL
MONOCYTES # BLD AUTO: 0.25 K/UL (ref 0–0.85)
MONOCYTES NFR BLD AUTO: 2.6 % (ref 0–13.4)
MORPHOLOGY BLD-IMP: NORMAL
MUCOUS THREADS #/AREA URNS HPF: ABNORMAL /HPF
MYELOCYTES NFR BLD MANUAL: 3.5 %
NEUTROPHILS # BLD AUTO: 7.93 K/UL (ref 2–7.15)
NEUTROPHILS NFR BLD: 83.5 % (ref 44–72)
NITRITE UR QL STRIP.AUTO: NEGATIVE
NRBC # BLD AUTO: 0.02 K/UL
NRBC BLD AUTO-RTO: 0.2 /100 WBC
OVALOCYTES BLD QL SMEAR: NORMAL
PH UR STRIP.AUTO: 8.5 [PH]
PLATELET # BLD AUTO: 351 K/UL (ref 164–446)
PLATELET BLD QL SMEAR: NORMAL
PMV BLD AUTO: 9.8 FL (ref 9–12.9)
POIKILOCYTOSIS BLD QL SMEAR: NORMAL
POLYCHROMASIA BLD QL SMEAR: NORMAL
POTASSIUM SERPL-SCNC: 3.6 MMOL/L (ref 3.6–5.5)
PROT SERPL-MCNC: 4.6 G/DL (ref 6–8.2)
PROT UR QL STRIP: 600 MG/DL
PROTHROMBIN TIME: 14.1 SEC (ref 12–14.6)
RBC # BLD AUTO: 2.62 M/UL (ref 4.2–5.4)
RBC # URNS HPF: ABNORMAL /HPF
RBC BLD AUTO: PRESENT
RBC UR QL AUTO: NEGATIVE
SODIUM SERPL-SCNC: 137 MMOL/L (ref 135–145)
SP GR UR STRIP.AUTO: 1.01
TRANS CELLS #/AREA URNS HPF: ABNORMAL /HPF
TROPONIN I SERPL-MCNC: 0.03 NG/ML (ref 0–0.04)
WBC # BLD AUTO: 9.5 K/UL (ref 4.8–10.8)
WBC #/AREA URNS HPF: ABNORMAL /HPF

## 2017-02-04 PROCEDURE — 82962 GLUCOSE BLOOD TEST: CPT

## 2017-02-04 PROCEDURE — 36415 COLL VENOUS BLD VENIPUNCTURE: CPT

## 2017-02-04 PROCEDURE — 87086 URINE CULTURE/COLONY COUNT: CPT

## 2017-02-04 PROCEDURE — 84484 ASSAY OF TROPONIN QUANT: CPT

## 2017-02-04 PROCEDURE — 770009 HCHG ROOM/CARE - ONCOLOGY SEMI PRI*

## 2017-02-04 PROCEDURE — 85007 BL SMEAR W/DIFF WBC COUNT: CPT | Mod: 91

## 2017-02-04 PROCEDURE — 81001 URINALYSIS AUTO W/SCOPE: CPT

## 2017-02-04 PROCEDURE — 85610 PROTHROMBIN TIME: CPT

## 2017-02-04 PROCEDURE — 83735 ASSAY OF MAGNESIUM: CPT

## 2017-02-04 PROCEDURE — 85730 THROMBOPLASTIN TIME PARTIAL: CPT

## 2017-02-04 PROCEDURE — 93005 ELECTROCARDIOGRAM TRACING: CPT

## 2017-02-04 PROCEDURE — 70450 CT HEAD/BRAIN W/O DYE: CPT

## 2017-02-04 PROCEDURE — 700111 HCHG RX REV CODE 636 W/ 250 OVERRIDE (IP): Performed by: STUDENT IN AN ORGANIZED HEALTH CARE EDUCATION/TRAINING PROGRAM

## 2017-02-04 PROCEDURE — 700111 HCHG RX REV CODE 636 W/ 250 OVERRIDE (IP): Performed by: INTERNAL MEDICINE

## 2017-02-04 PROCEDURE — 99285 EMERGENCY DEPT VISIT HI MDM: CPT

## 2017-02-04 PROCEDURE — 71010 DX-CHEST-PORTABLE (1 VIEW): CPT

## 2017-02-04 PROCEDURE — 302136 NUTRITION PUMP: Performed by: STUDENT IN AN ORGANIZED HEALTH CARE EDUCATION/TRAINING PROGRAM

## 2017-02-04 PROCEDURE — 80053 COMPREHEN METABOLIC PANEL: CPT | Mod: 91

## 2017-02-04 PROCEDURE — A9270 NON-COVERED ITEM OR SERVICE: HCPCS | Performed by: STUDENT IN AN ORGANIZED HEALTH CARE EDUCATION/TRAINING PROGRAM

## 2017-02-04 PROCEDURE — 94760 N-INVAS EAR/PLS OXIMETRY 1: CPT

## 2017-02-04 PROCEDURE — 700102 HCHG RX REV CODE 250 W/ 637 OVERRIDE(OP): Performed by: STUDENT IN AN ORGANIZED HEALTH CARE EDUCATION/TRAINING PROGRAM

## 2017-02-04 PROCEDURE — 700105 HCHG RX REV CODE 258: Performed by: STUDENT IN AN ORGANIZED HEALTH CARE EDUCATION/TRAINING PROGRAM

## 2017-02-04 PROCEDURE — 85027 COMPLETE CBC AUTOMATED: CPT | Mod: 91

## 2017-02-04 RX ORDER — BETHANECHOL CHLORIDE 25 MG/1
25 TABLET ORAL DAILY
Status: DISCONTINUED | OUTPATIENT
Start: 2017-02-05 | End: 2017-02-05

## 2017-02-04 RX ORDER — MOXIFLOXACIN HYDROCHLORIDE 400 MG/1
400 TABLET ORAL DAILY
COMMUNITY
Start: 2017-02-03 | End: 2017-02-10

## 2017-02-04 RX ORDER — DEXAMETHASONE SODIUM PHOSPHATE 4 MG/ML
10 INJECTION, SOLUTION INTRA-ARTICULAR; INTRALESIONAL; INTRAMUSCULAR; INTRAVENOUS; SOFT TISSUE ONCE
Status: COMPLETED | OUTPATIENT
Start: 2017-02-04 | End: 2017-02-04

## 2017-02-04 RX ORDER — ENEMA 19; 7 G/133ML; G/133ML
1 ENEMA RECTAL
Status: DISCONTINUED | OUTPATIENT
Start: 2017-02-04 | End: 2017-02-05

## 2017-02-04 RX ORDER — HEPARIN SODIUM 5000 [USP'U]/ML
5000 INJECTION, SOLUTION INTRAVENOUS; SUBCUTANEOUS EVERY 8 HOURS
Status: DISCONTINUED | OUTPATIENT
Start: 2017-02-04 | End: 2017-02-05

## 2017-02-04 RX ORDER — DEXAMETHASONE SODIUM PHOSPHATE 4 MG/ML
4 INJECTION, SOLUTION INTRA-ARTICULAR; INTRALESIONAL; INTRAMUSCULAR; INTRAVENOUS; SOFT TISSUE EVERY 6 HOURS
Status: DISCONTINUED | OUTPATIENT
Start: 2017-02-05 | End: 2017-02-06 | Stop reason: HOSPADM

## 2017-02-04 RX ORDER — AMOXICILLIN 250 MG
1 CAPSULE ORAL NIGHTLY
Status: DISCONTINUED | OUTPATIENT
Start: 2017-02-04 | End: 2017-02-05

## 2017-02-04 RX ORDER — DEXTROSE MONOHYDRATE 25 G/50ML
25 INJECTION, SOLUTION INTRAVENOUS
Status: DISCONTINUED | OUTPATIENT
Start: 2017-02-04 | End: 2017-02-05

## 2017-02-04 RX ORDER — LEVETIRACETAM 100 MG/ML
500 SOLUTION ORAL EVERY 12 HOURS
Status: DISCONTINUED | OUTPATIENT
Start: 2017-02-04 | End: 2017-02-05

## 2017-02-04 RX ORDER — LACTULOSE 20 G/30ML
30 SOLUTION ORAL
Status: DISCONTINUED | OUTPATIENT
Start: 2017-02-04 | End: 2017-02-05

## 2017-02-04 RX ORDER — ASCORBIC ACID 500 MG
500 TABLET ORAL DAILY
Status: DISCONTINUED | OUTPATIENT
Start: 2017-02-05 | End: 2017-02-05

## 2017-02-04 RX ORDER — INSULIN GLARGINE 100 [IU]/ML
20 INJECTION, SOLUTION SUBCUTANEOUS EVERY EVENING
Status: DISCONTINUED | OUTPATIENT
Start: 2017-02-04 | End: 2017-02-05

## 2017-02-04 RX ORDER — BETHANECHOL CHLORIDE 25 MG/1
25 TABLET ORAL DAILY
COMMUNITY

## 2017-02-04 RX ORDER — BISACODYL 10 MG
10 SUPPOSITORY, RECTAL RECTAL
Status: DISCONTINUED | OUTPATIENT
Start: 2017-02-04 | End: 2017-02-05

## 2017-02-04 RX ORDER — DOXAZOSIN MESYLATE 1 MG/1
1 TABLET ORAL
Status: DISCONTINUED | OUTPATIENT
Start: 2017-02-04 | End: 2017-02-05

## 2017-02-04 RX ORDER — ACETAMINOPHEN 325 MG/1
650 TABLET ORAL EVERY 4 HOURS PRN
Status: DISCONTINUED | OUTPATIENT
Start: 2017-02-04 | End: 2017-02-05

## 2017-02-04 RX ORDER — DEXAMETHASONE 1 MG
1 TABLET ORAL 2 TIMES DAILY
Status: DISCONTINUED | OUTPATIENT
Start: 2017-02-04 | End: 2017-02-04

## 2017-02-04 RX ORDER — AMOXICILLIN 250 MG
1 CAPSULE ORAL
Status: DISCONTINUED | OUTPATIENT
Start: 2017-02-04 | End: 2017-02-05

## 2017-02-04 RX ORDER — DOCUSATE SODIUM 100 MG/1
100 CAPSULE, LIQUID FILLED ORAL EVERY MORNING
Status: DISCONTINUED | OUTPATIENT
Start: 2017-02-04 | End: 2017-02-05

## 2017-02-04 RX ORDER — SODIUM CHLORIDE 9 MG/ML
INJECTION, SOLUTION INTRAVENOUS CONTINUOUS
Status: DISCONTINUED | OUTPATIENT
Start: 2017-02-04 | End: 2017-02-04

## 2017-02-04 RX ORDER — SIMVASTATIN 20 MG
10 TABLET ORAL EVERY EVENING
Status: DISCONTINUED | OUTPATIENT
Start: 2017-02-04 | End: 2017-02-05

## 2017-02-04 RX ADMIN — AMPICILLIN SODIUM AND SULBACTAM SODIUM 1.5 G: 1; .5 INJECTION, POWDER, FOR SOLUTION INTRAMUSCULAR; INTRAVENOUS at 20:14

## 2017-02-04 RX ADMIN — SIMVASTATIN 10 MG: 20 TABLET, FILM COATED ORAL at 23:00

## 2017-02-04 RX ADMIN — LEVETIRACETAM 500 MG: 100 SOLUTION ORAL at 23:00

## 2017-02-04 RX ADMIN — DEXAMETHASONE SODIUM PHOSPHATE 10 MG: 4 INJECTION, SOLUTION INTRAMUSCULAR; INTRAVENOUS at 18:41

## 2017-02-04 RX ADMIN — METOPROLOL TARTRATE 25 MG: 25 TABLET, FILM COATED ORAL at 23:00

## 2017-02-04 RX ADMIN — INSULIN GLARGINE 20 UNITS: 100 INJECTION, SOLUTION SUBCUTANEOUS at 22:35

## 2017-02-04 RX ADMIN — STANDARDIZED SENNA CONCENTRATE AND DOCUSATE SODIUM 1 TABLET: 8.6; 5 TABLET, FILM COATED ORAL at 23:00

## 2017-02-04 RX ADMIN — HEPARIN SODIUM 5000 UNITS: 5000 INJECTION, SOLUTION INTRAVENOUS; SUBCUTANEOUS at 20:15

## 2017-02-04 RX ADMIN — INSULIN LISPRO 3 UNITS: 100 INJECTION, SOLUTION INTRAVENOUS; SUBCUTANEOUS at 22:35

## 2017-02-04 RX ADMIN — DOXAZOSIN MESYLATE 1 MG: 1 TABLET ORAL at 23:00

## 2017-02-04 ASSESSMENT — ENCOUNTER SYMPTOMS
SHORTNESS OF BREATH: 0
CHILLS: 0
SORE THROAT: 0
PALPITATIONS: 0
BRUISES/BLEEDS EASILY: 1
ABDOMINAL PAIN: 0
COUGH: 1
SEIZURES: 0
DIZZINESS: 0
FEVER: 0
LOSS OF CONSCIOUSNESS: 1
DIAPHORESIS: 0
FOCAL WEAKNESS: 1
WEAKNESS: 1

## 2017-02-04 ASSESSMENT — LIFESTYLE VARIABLES: DO YOU DRINK ALCOHOL: NO

## 2017-02-04 NOTE — ED NOTES
"Pt presents via [facility] from skilled nursing, pt is s/p craniotomy, per staff pt had change in mentation today around 1100, according to outside records \"she responds to touch\" which her baseline is verbal.  Upon arrival pt is mostly non-verbal, did react to noxious stimuli (PIV placement). Per records she has a newly dx glioblastoma. Pt was changed into a gown, placed on a monitor, will notify ERP.   "

## 2017-02-04 NOTE — SENIOR ADMIT NOTE
Patient is a 71 y.o woman with pmhx DMII, HTN, ESRD,GBM ( diagnosis 1/6/17) status post right craniotomy by Dr. Ventura(1/15/17) transfered from [facility] to ED with altered mental status.    At bedside, nephew, brother, sister.     Yesterday was at baseline since yesterday after Dialysis. Family reports at baseline she opens eye spontaneously, move all extremities, usually A&Ox2-3. Usually responsive to verbal stimuli.     Today around 11am, family noticed she only responded to pain. She is very lethargic.    : A&Ox1 ( self). Family notice she has been coughing for last few days and her general mentation has gradually decreased, difficulty concentration.         Her doctors:  - radiation oncology, plan for mapping on 2/14/17.   Plan: Dexamethasone 1 mg via NG tube 2 times daily to continue until seen by Dr. Martinez for radiation treatment   Dr. Salomon-oncology, outpatient Temodar treatment       CT HEAD:    1.  Significant increase in white matter edema and gray matter thickening in the right frontal and temporal lobes highly suspicious for worsening tumor    2.  9.3 mm of right to left shift producing partial subfalcine herniation with mild dilation of left lateral ventricle.      A/P  Alter mental status secondary either infection, dehydration, Progression of disease( GBM), unlikely ACS or due to electrolyte imbalance. She has history of dysphagia and has a cortrak. She is at high aspiration risk. After reviewing her CXR compared to one a month ago there is infiltrates RML. I agree the xray is not of good quality but there is significant infiltrates. CT scan does show worsening edema and worsening tumor size with a 9.3mm R to L shift. Last MRI shows a 5 mm of LEFT-to-RIGHT midline shift.  -treat for aspiration pneumonia, unasyn     -gentle IVF  -palliative consult. Discussed with family, they want to re-visit issue tomorrow  -possible comfort care, tomorrow   -DNR/DNI  -pt/ot/slp if we are still  resuming care tomorrow   -Keppra 500 mg per NG every 12 hours, seizure ppx  -IV: 10 mg stat, 4 mg /IV every 6 hours until response  -nutrition consult for tube feeds  - Nephrology consulted for Monday dialysis. No urgent need   -aspiration precaution,fall seizure precaution  -tylenol for pain, avoid narcotics in elderly lady but ok to give morphine if needed. She is allergic to:     ALLERGIES:  CODEINE, DILAUDID, PERCOCET, PERCODAN, ULTRAM, AND VICODIN

## 2017-02-04 NOTE — ED NOTES
Neuro exam is limited secondary to pt condition, pt is alert to self only, minimal verbal response, pt is able to follow some commands, would not open eyes but would squeeze right hand, would not squeeze left hand, would not wiggle toes or lift any extremities. Pupils are pin point, unknown if this is baseline. Pt is s/p craniotomy, healing scar present to R scalp.

## 2017-02-04 NOTE — IP AVS SNAPSHOT
2/6/2017          Yina Stephens  [address] [place] [address]    Dear Yina:    Critical access hospital wants to ensure your discharge home is safe and you or your loved ones have had all your questions answered regarding your care after you leave the hospital.    You may receive a telephone call within two days of your discharge.  This call is to make certain you understand your discharge instructions as well as ensure we provided you with the best care possible during your stay with us.     The call will only last approximately 3-5 minutes and will be done by a nurse.    Once again, we want to ensure your discharge home is safe and that you have a clear understanding of any next steps in your care.  If you have any questions or concerns, please do not hesitate to contact us, we are here for you.  Thank you for choosing [facility] for your healthcare needs.    Sincerely,    Sanju Mix    [facility]

## 2017-02-04 NOTE — IP AVS SNAPSHOT
" Home Care Instructions                                                                                                                  Name:Yina Stephens  Medical Record Number:[id number]  CSN: [id number]    YOB: 1945   Age: 71 y.o.  Sex: female  HT:1.575 m (5' 2.01\") WT: 72.576 kg (160 lb)          Admit Date: 2/4/2017     Discharge Date:   Today's Date: 2/6/2017  Attending Doctor:  ARIANA Johns                  Allergies:  Codeine; Dilaudid; Percocet; Percodan; Ultram; and Vicodin            Discharge Instructions       Discharge Instructions    Discharged to home by ambulance with escort. Discharged via ambulance, hospital escort: Yes.  Special equipment needed: Not Applicable    Be sure to schedule a follow-up appointment with your primary care doctor or any specialists as instructed.     Discharge Plan:   Diet Plan: Discussed  Activity Level: Discussed  Confirmed Follow up Appointment: No (Comments) (Home with hospice)  Confirmed Symptoms Management: Discussed  Medication Reconciliation Updated: Yes  Influenza Vaccine Indication: Not indicated: Previously immunized this influenza season and > 8 years of age    I understand that a diet low in cholesterol, fat, and sodium is recommended for good health. Unless I have been given specific instructions below for another diet, I accept this instruction as my diet prescription.   Other diet: Eat as tolerated    Special Instructions: None    · Is patient discharged on Warfarin / Coumadin?   No     · Is patient Post Blood Transfusion?  No    Depression / Suicide Risk    As you are discharged from this [facility] facility, it is important to learn how to keep safe from harming yourself.    Recognize the warning signs:  · Abrupt changes in personality, positive or negative- including increase in energy   · Giving away possessions  · Change in eating patterns- significant weight changes-  positive or negative  · Change in sleeping patterns- unable " to sleep or sleeping all the time   · Unwillingness or inability to communicate  · Depression  · Unusual sadness, discouragement and loneliness  · Talk of wanting to die  · Neglect of personal appearance   · Rebelliousness- reckless behavior  · Withdrawal from people/activities they love  · Confusion- inability to concentrate     If you or a loved one observes any of these behaviors or has concerns about self-harm, here's what you can do:  · Talk about it- your feelings and reasons for harming yourself  · Remove any means that you might use to hurt yourself (examples: pills, rope, extension cords, firearm)  · Get professional help from the community (Mental Health, Substance Abuse, psychological counseling)  · Do not be alone:Call your Safe Contact- someone whom you trust who will be there for you.  · Call your local CRISIS HOTLINE 661-5208 or 453-301-8817  · Call your local Children's Mobile Crisis Response Team [place] (455) 831-4393 or www.[employer]  · Call the toll free National Suicide Prevention Hotlines   · National Suicide Prevention Lifeline 751-629-JFAK (5425)  · National Hope Line Network 800-SUICIDE (938-9947)        Your appointments     Feb 06, 2017  1:00 PM   Radiation New Patient with Devin Martinez M.D.   [facility] (--)    [address] [place] [address]   [phone]                 Discharge Medication Instructions:    Below are the medications your physician expects you to take upon discharge:    Review all your home medications and newly ordered medications with your doctor and/or pharmacist. Follow medication instructions as directed by your doctor and/or pharmacist.    Please keep your medication list with you and share with your physician.               Medication List      START taking these medications        Instructions    artificial tears 1.4 % Soln    Place 2 Drops in both eyes every 6 hours as needed (Dry eyes   ).   Dose:  2 Drop       bisacodyl 10 MG  Supp   Commonly known as:  DULCOLAX    Insert 1 Suppository in rectum 1 time daily as needed.   Dose:  10 mg       lidocaine viscous 2% 2 % Soln   Commonly known as:  XYLOCAINE    Take 5 mL by mouth every four hours as needed for Throat/Mouth Pain (Comfort).   Dose:  5 mL       * lorazepam 2 MG/ML Soln   Commonly known as:  ATIVAN    0.5-1 mL by Intravenous route every four hours as needed (anxiety/restlessness).   Dose:  1-2 mg       * lorazepam 2 MG/ML Soln   Commonly known as:  ATIVAN    1.5-2 mL by Intravenous route every four hours as needed (anxiety/restlessness).   Dose:  3-4 mg       * lorazepam 2 MG/ML Soln   Commonly known as:  ATIVAN    2.5 mL by Intravenous route every four hours as needed (anxiety/restlessness).   Dose:  5 mg       morphine (pf) 10 mg/ml 10 MG/ML Soln   Last time this was given:  5 mg on 2/5/2017 10:10 PM    0.5-1 mL by Intravenous route every 1 hour as needed.   Dose:  5-10 mg       ondansetron 4 MG Tbdp   Commonly known as:  ZOFRAN ODT    Take 1 Tab by mouth every 8 hours as needed for Nausea/Vomiting ( ).   Dose:  4 mg       * ondansetron 4 MG/2ML Soln injection   Commonly known as:  ZOFRAN    2 mL by Intravenous route every 8 hours as needed for Nausea/Vomiting (if unable to tolerate PO.).   Dose:  4 mg       * ondansetron 4 MG/2ML Soln injection   Commonly known as:  ZOFRAN    4 mL by Intravenous route every 8 hours as needed for Nausea/Vomiting (if unable to tolerate PO).   Dose:  8 mg       oxycodone 20 MG/ML Conc   Last time this was given:  5 mg on 2/5/2017 10:16 AM    Take 0.25 mL by mouth every 1 hour as needed (pain).   Dose:  5 mg       scopolamine 1.5 MG/3DAYS Pt72   Last time this was given:  1 Patch on 2/5/2017 10:48 AM   Commonly known as:  TRANSDERM-SCOP    Apply 1 Patch to skin as directed every 72 hours.   Dose:  1 Patch       * Notice:  This list has 5 medication(s) that are the same as other medications prescribed for you. Read the directions carefully, and ask  your doctor or other care provider to review them with you.      CONTINUE taking these medications        Instructions    dexamethasone 1 MG Tabs   Commonly known as:  DECADRON    1 Tab by Nasogastric route 2 Times a Day.   Dose:  1 mg         ASK your doctor about these medications        Instructions    acetaminophen 325 MG Tabs   Commonly known as:  TYLENOL    2 Tabs by Per NG Tube route every four hours as needed (Mild Pain; (Pain scale 1-3); Temp greater than 100.5 F).   Dose:  650 mg       alendronate 35 MG tablet   Commonly known as:  FOSAMAX    1 Tab by Per NG Tube route every 7 days.   Dose:  35 mg       ascorbic acid 500 MG tablet   Commonly known as:  VITAMIN C    Take 1 Tab by mouth every day.   Dose:  500 mg       bethanechol 25 MG Tabs   Commonly known as:  URECHOLINE    25 mg by Per NG Tube route every day.   Dose:  25 mg       CULTURELLE PO    1 Cap by Per NG Tube route every day. X 37 days   Dose:  1 Cap       doxazosin 1 MG Tabs   Last time this was given:  1 mg on 2/4/2017 11:00 PM   Commonly known as:  CARDURA    1 Tab by Per NG Tube route every bedtime.   Dose:  1 mg       ferrous sulfate 325 (65 FE) MG tablet    1 Tab by Per NG Tube route every morning with breakfast.   Dose:  325 mg       insulin glargine 100 UNIT/ML Soln   Last time this was given:  20 Units on 2/4/2017 10:35 PM   Commonly known as:  LANTUS    Inject 20 Units as instructed every evening.   Dose:  20 Units       insulin lispro 100 UNIT/ML Soln   Last time this was given:  3 Units on 2/5/2017  5:32 AM   Commonly known as:  HUMALOG    Inject 3-14 Units as instructed 4 Times a Day,Before Meals and at Bedtime. Per sliding scale  0-150 = 0 units 151-200 = 3 units 201-250 = 5 units 251-300 = 7 units 301-350 = 10 units 351-400 = 12 units > 400 = 14 units  Before meals and at bedtime   Dose:  3-14 Units       levetiracetam 100 MG/ML Soln   Last time this was given:  500 mg on 2/4/2017 11:00 PM   Commonly known as:  KEPPRA    5 mL by  Nasogastric route every 12 hours.   Dose:  500 mg       metoprolol 25 MG Tabs   Last time this was given:  25 mg on 2/4/2017 11:00 PM   Commonly known as:  LOPRESSOR    1 Tab by Per NG Tube route 2 Times a Day.   Dose:  25 mg       moxifloxacin 400 MG Tabs   Commonly known as:  AVELOX    Take 400 mg by mouth every day.   Dose:  400 mg       simvastatin 10 MG Tabs   Last time this was given:  10 mg on 2/4/2017 11:00 PM   Commonly known as:  ZOCOR    1 Tab by Per NG Tube route every evening.   Dose:  10 mg               Instructions           Diet / Nutrition:    Follow any diet instructions given to you by your doctor or the dietician, including how much salt (sodium) you are allowed each day.    If you are overweight, talk to your doctor about a weight reduction plan.    Activity:    Remain physically active following your doctor's instructions about exercise and activity.    Rest often.     Any time you become even a little tired or short of breath, SIT DOWN and rest.    Worsening Symptoms:    Report any of the following signs and symptoms to the doctor's office immediately:    *Pain of jaw, arm, or neck  *Chest pain not relieved by medication                               *Dizziness or loss of consciousness  *Difficulty breathing even when at rest   *More tired than usual                                       *Bleeding drainage or swelling of surgical site  *Swelling of feet, ankles, legs or stomach                 *Fever (>100ºF)  *Pink or blood tinged sputum  *Weight gain (3lbs/day or 5lbs /week)           *Shock from internal defibrillator (if applicable)  *Palpitations or irregular heartbeats                *Cool and/or numb extremities    Stroke Awareness    Common Risk Factors for Stroke include:    Age  Atrial Fibrillation  Carotid Artery Stenosis  Diabetes Mellitus  Excessive alcohol consumption  High blood pressure  Overweight   Physical inactivity  Smoking    Warning signs and symptoms of a stroke  include:    *Sudden numbness or weakness of the face, arm or leg (especially on one side of the body).  *Sudden confusion, trouble speaking or understanding.  *Sudden trouble seeing in one or both eyes.  *Sudden trouble walking, dizziness, loss of balance or coordination.Sudden severe headache with no known cause.    It is very important to get treatment quickly when a stroke occurs. If you experience any of the above warning signs, call 911 immediately.                   Disclaimer         Quit Smoking / Tobacco Use:    I understand the use of any tobacco products increases my chance of suffering from future heart disease or stroke and could cause other illnesses which may shorten my life. Quitting the use of tobacco products is the single most important thing I can do to improve my health. For further information on smoking / tobacco cessation call a Toll Free Quit Line at 1-843.790.8120 (*National Cancer [place]) or 1-756.167.6903 (American Lung Association) or you can access the web based program at www.lungTransNet.org.    [place] Tobacco Users Help Line:  (664) 207-4824       Toll Free: 1-105.207.3562  Quit Tobacco Program [facility] (989)853-7667    Crisis Hotline:    [place] Crisis Hotline:  0-191-ZCUXLMK or 1-326.190.7512    [place] Crisis Hotline:    1-131.750.6991 or 786-335-6991    Discharge Survey:   Thank you for choosing [facility]. We hope we did everything we could to make your hospital stay a pleasant one. You may be receiving a phone survey and we would appreciate your time and participation in answering the questions. Your input is very valuable to us in our efforts to improve our service to our patients and their families.        My signature on this form indicates that:    1. I have reviewed and understand the above information.  2. My questions regarding this information have been answered to my satisfaction.  3. I have formulated a plan with my discharge nurse to obtain  my prescribed medications for home.                  Disclaimer         __________________________________                     __________       ________                       Patient Signature                                                 Date                    Time

## 2017-02-04 NOTE — ED NOTES
Pt resting in bed, no distress noted, nephew at bedside, per Dr. Funes will consult palliative care, POC is admission.

## 2017-02-04 NOTE — IP AVS SNAPSHOT
" <p align=\"LEFT\"><IMG SRC=\"//[email or web address]\" alt=\"Image\" WIDTH=\"50%\" HEIGHT=\"200\" BORDER=\"\"></p>                   Name:Yina Stephens  Medical Record Number:[id number]  CSN: [id number]    YOB: 1945   Age: 71 y.o.  Sex: female  HT:1.575 m (5' 2.01\") WT: 72.576 kg (160 lb)          Admit Date: 2/4/2017     Discharge Date:   Today's Date: 2/6/2017  Attending Doctor:  ROBERT Johns*                  Allergies:  Codeine; Dilaudid; Percocet; Percodan; Ultram; and Vicodin          Your appointments     Feb 06, 2017  1:00 PM   Radiation New Patient with Devin Martinez M.D.   [facility] (--)    [address] [place] [address]   [phone]                 Medication List      Take these Medications        Instructions    artificial tears 1.4 % Soln    Place 2 Drops in both eyes every 6 hours as needed (Dry eyes   ).   Dose:  2 Drop       bisacodyl 10 MG Supp   Commonly known as:  DULCOLAX    Insert 1 Suppository in rectum 1 time daily as needed.   Dose:  10 mg       dexamethasone 1 MG Tabs   Commonly known as:  DECADRON    1 Tab by Nasogastric route 2 Times a Day.   Dose:  1 mg       lidocaine viscous 2% 2 % Soln   Commonly known as:  XYLOCAINE    Take 5 mL by mouth every four hours as needed for Throat/Mouth Pain (Comfort).   Dose:  5 mL       * lorazepam 2 MG/ML Soln   Commonly known as:  ATIVAN    0.5-1 mL by Intravenous route every four hours as needed (anxiety/restlessness).   Dose:  1-2 mg       * lorazepam 2 MG/ML Soln   Commonly known as:  ATIVAN    1.5-2 mL by Intravenous route every four hours as needed (anxiety/restlessness).   Dose:  3-4 mg       * lorazepam 2 MG/ML Soln   Commonly known as:  ATIVAN    2.5 mL by Intravenous route every four hours as needed (anxiety/restlessness).   Dose:  5 mg       morphine (pf) 10 mg/ml 10 MG/ML Soln    0.5-1 mL by Intravenous route every 1 hour as needed.   Dose:  5-10 mg       ondansetron 4 MG Tbdp   Commonly known as:  " ZOFRAN ODT    Take 1 Tab by mouth every 8 hours as needed for Nausea/Vomiting ( ).   Dose:  4 mg       * ondansetron 4 MG/2ML Soln injection   Commonly known as:  ZOFRAN    2 mL by Intravenous route every 8 hours as needed for Nausea/Vomiting (if unable to tolerate PO.).   Dose:  4 mg       * ondansetron 4 MG/2ML Soln injection   Commonly known as:  ZOFRAN    4 mL by Intravenous route every 8 hours as needed for Nausea/Vomiting (if unable to tolerate PO).   Dose:  8 mg       oxycodone 20 MG/ML Conc    Take 0.25 mL by mouth every 1 hour as needed (pain).   Dose:  5 mg       scopolamine 1.5 MG/3DAYS Pt72   Commonly known as:  TRANSDERM-SCOP    Apply 1 Patch to skin as directed every 72 hours.   Dose:  1 Patch       * Notice:  This list has 5 medication(s) that are the same as other medications prescribed for you. Read the directions carefully, and ask your doctor or other care provider to review them with you.      Ask your Physician about these medications        Instructions    acetaminophen 325 MG Tabs   Commonly known as:  TYLENOL    2 Tabs by Per NG Tube route every four hours as needed (Mild Pain; (Pain scale 1-3); Temp greater than 100.5 F).   Dose:  650 mg       alendronate 35 MG tablet   Commonly known as:  FOSAMAX    1 Tab by Per NG Tube route every 7 days.   Dose:  35 mg       ascorbic acid 500 MG tablet   Commonly known as:  VITAMIN C    Take 1 Tab by mouth every day.   Dose:  500 mg       bethanechol 25 MG Tabs   Commonly known as:  URECHOLINE    25 mg by Per NG Tube route every day.   Dose:  25 mg       CULTURELLE PO    1 Cap by Per NG Tube route every day. X 37 days   Dose:  1 Cap       doxazosin 1 MG Tabs   Commonly known as:  CARDURA    1 Tab by Per NG Tube route every bedtime.   Dose:  1 mg       ferrous sulfate 325 (65 FE) MG tablet    1 Tab by Per NG Tube route every morning with breakfast.   Dose:  325 mg       insulin glargine 100 UNIT/ML Soln   Commonly known as:  LANTUS    Inject 20 Units as  instructed every evening.   Dose:  20 Units       insulin lispro 100 UNIT/ML Soln   Commonly known as:  HUMALOG    Inject 3-14 Units as instructed 4 Times a Day,Before Meals and at Bedtime. Per sliding scale  0-150 = 0 units 151-200 = 3 units 201-250 = 5 units 251-300 = 7 units 301-350 = 10 units 351-400 = 12 units > 400 = 14 units  Before meals and at bedtime   Dose:  3-14 Units       levetiracetam 100 MG/ML Soln   Commonly known as:  KEPPRA    5 mL by Nasogastric route every 12 hours.   Dose:  500 mg       metoprolol 25 MG Tabs   Commonly known as:  LOPRESSOR    1 Tab by Per NG Tube route 2 Times a Day.   Dose:  25 mg       moxifloxacin 400 MG Tabs   Commonly known as:  AVELOX    Take 400 mg by mouth every day.   Dose:  400 mg       simvastatin 10 MG Tabs   Commonly known as:  ZOCOR    1 Tab by Per NG Tube route every evening.   Dose:  10 mg

## 2017-02-04 NOTE — ED PROVIDER NOTES
ED Provider Note    Scribed for Avelino Funes M.D. by Mary Weldon. 2/4/2017  1:06 PM    Primary care provider: Omer Park M.D.  Means of arrival: ambulance  History obtained from: Patient  History limited by: altered level of consciousness      CHIEF COMPLAINT  Chief Complaint   Patient presents with   • ALOC       HPI  Yina Stephens is a 71 y.o. female who presents to the Emergency Department presents from skilled nursing for altered level of consciousness, onset two hours ago.  Per skilled nursing staff, the patient was only responding to touch and noxious stimuli. Previously, she was responsive to verbal stimuli. Patient has a history of newly diagnosed tumor. Per family, she has been on a feeding tube for 3 weeks. Per family the patient has had a cough for the past week. They  deny any blood in sputum or fever. She has associated malaise. Family confirms the patient has a DNR and DNI in place.     HPI is limited secondary to patient's altered level of consciousness.       REVIEW OF SYSTEMS  Pertinent negatives include no hemoptysis, fever.   ROS is limited secondary to patient's altered level of consciousness.   See HPI for further details.       PAST MEDICAL HISTORY   has a past medical history of Hypertension; Dyslipidemia; Hiatus hernia syndrome; Pain; Diabetes; Renal disorder; and CATARACT.      SURGICAL HISTORY   has past surgical history that includes nephrectomy laparoscopic (1980); appendectomy; dwain by laparoscopy; hemorrhoidectomy (7/31/2013); and craniotomy stealth (1/15/2017).        SOCIAL HISTORY  Social History   Substance Use Topics   • Smoking status: Never Smoker    • Smokeless tobacco: None   • Alcohol Use: No      History   Drug Use No       FAMILY HISTORY  None noted         CURRENT MEDICATIONS  Home Medications     Reviewed by Tasha Hendrix, Pharmacy Int (Pharmacy Intern) on 02/04/17 at 1529  Med List Status: Complete    Medication Last Dose Status    acetaminophen (TYLENOL)  "325 MG Tab 2/1/2017 Active    alendronate (FOSAMAX) 35 MG tablet 1/31/2017 Active    ascorbic acid (VITAMIN C) 500 MG tablet 2/4/2017 Active    bethanechol (URECHOLINE) 25 MG Tab 2/4/2017 Active    dexamethasone (DECADRON) 1 MG Tab 2/4/2017 Active    doxazosin (CARDURA) 1 MG Tab 2/3/2017 Active    ferrous sulfate 325 (65 FE) MG tablet 2/4/2017 Active    insulin glargine (LANTUS) 100 UNIT/ML Solution 2/3/2017 Active    insulin lispro (HUMALOG) 100 UNIT/ML Solution 2/4/2017 Active    Lactobacillus Rhamnosus, GG, (CULTURELLE PO) unk Active    levetiracetam (KEPPRA) 100 MG/ML Solution 2/4/2017 Active    metoprolol (LOPRESSOR) 25 MG Tab 2/4/2017 Active    moxifloxacin (AVELOX) 400 MG Tab 2/3/2017 Active    simvastatin (ZOCOR) 10 MG Tab 2/3/2017 Active                ALLERGIES  Allergies   Allergen Reactions   • Codeine Vomiting   • Dilaudid [Hydromorphone Hcl] Vomiting   • Percocet [Oxycodone-Acetaminophen] Vomiting   • Percodan [Oxycodone-Aspirin] Vomiting   • Ultram [Tramadol] Vomiting   • Vicodin [Hydrocodone-Acetaminophen] Vomiting         PHYSICAL EXAM  VITAL SIGNS: /79 mmHg  Pulse 99  Temp(Src) 37.1 °C (98.8 °F)  Resp 20  Ht 1.575 m (5' 2\")  SpO2 98%  Constitutional: Well developed, Well nourished.  HENT: Normocephalic, Atraumatic, Bilateral external ears normal, Oropharynx is clear mucous membranes are moist. No oral exudates or nasal discharge. Pooled secretions immediately flow out of patient's mouth with talking. Feeding tube to right nares.   Eyes: Pupils are equal round and reactive, EOMI, Conjunctiva normal, No discharge.   Neck: Normal range of motion, No tenderness, Supple, No stridor. No meningismus.  Lymphatic: No lymphadenopathy noted.   Cardiovascular: Regular rate and rhythm without murmur rub or gallop.  Thorax & Lungs:   Shallow breaths bilaterally. no wheezes, rhonchi or rales. There is no chest wall tenderness.  Abdomen: Soft non-tender non-distended. There is no rebound or guarding. No " organomegaly is appreciated. Bowel sounds are normal.  Skin: Normal without rash. Ecchymosis to right neck.    Back: No CVA or spinal tenderness.   Extremities: Intact distal pulses, No edema, No tenderness, No cyanosis, No clubbing. Capillary refill is less than 2 seconds.  Musculoskeletal: Good range of motion in all major joints. No tenderness to palpation or major deformities noted.   Neurologic:  Follows commands. Left foot drop. Decent bilateral . Normal sensory function,  noted. Reflexes are normal.  Psychiatric: Affect normal, Judgment normal, Mood normal. There is no suicidal ideation or patient reported hallucinations.         DIAGNOSTIC STUDIES / PROCEDURES  LABS  Labs Reviewed   CBC WITH DIFFERENTIAL - Abnormal; Notable for the following:     RBC 2.62 (*)     Hemoglobin 8.0 (*)     Hematocrit 25.3 (*)     MCHC 31.6 (*)     Neutrophils-Polys 83.50 (*)     Lymphocytes 6.90 (*)     Neutrophils (Absolute) 7.93 (*)     Lymphs (Absolute) 0.66 (*)     All other components within normal limits    Narrative:     Indicate which anticoagulants the patient is on:->NONE   COMP METABOLIC PANEL - Abnormal; Notable for the following:     Glucose 199 (*)     Bun 36 (*)     Creatinine 1.80 (*)     Calcium 8.2 (*)     Alkaline Phosphatase 128 (*)     Albumin 2.5 (*)     Total Protein 4.6 (*)     All other components within normal limits    Narrative:     Indicate which anticoagulants the patient is on:->NONE   APTT - Abnormal; Notable for the following:     APTT 21.6 (*)     All other components within normal limits    Narrative:     Indicate which anticoagulants the patient is on:->NONE   URINALYSIS,CULTURE IF INDICATED - Abnormal; Notable for the following:     Character Sl Cloudy (*)     Ph 8.5 (*)     Glucose Trace (*)     Protein 600 (*)     Leukocyte Esterase Moderate (*)     All other components within normal limits   URINE MICROSCOPIC (W/UA) - Abnormal; Notable for the following:     WBC 10-20 (*)     RBC 5-10  (*)     Bacteria Few (*)     All other components within normal limits   ESTIMATED GFR - Abnormal; Notable for the following:     GFR If  33 (*)     GFR If Non  28 (*)     All other components within normal limits    Narrative:     Indicate which anticoagulants the patient is on:->NONE   TROPONIN    Narrative:     Indicate which anticoagulants the patient is on:->NONE   PROTHROMBIN TIME    Narrative:     Indicate which anticoagulants the patient is on:->NONE   URINE CULTURE(NEW)   DIFFERENTIAL MANUAL    Narrative:     Indicate which anticoagulants the patient is on:->NONE   PERIPHERAL SMEAR REVIEW    Narrative:     Indicate which anticoagulants the patient is on:->NONE   PLATELET ESTIMATE    Narrative:     Indicate which anticoagulants the patient is on:->NONE   MORPHOLOGY    Narrative:     Indicate which anticoagulants the patient is on:->NONE   BLOOD CULTURE,HOLD    All labs reviewed by me.        EKG Interpretation:  EKG Interpretation    Interpreted by emergency department physician    Rhythm: normal sinus   Rate: normal  Axis: normal  Ectopy: none  Conduction: normal  ST Segments: no acute change  T Waves: no acute change  Q Waves: nonspecific    Clinical Impression: non-specific EKG         RADIOLOGY  CT-HEAD W/O   Final Result      1.  Significant increase in white matter edema and gray matter thickening in the right frontal and temporal lobes highly suspicious for worsening tumor      2.  9.3 mm of right to left shift producing partial subfalcine herniation with mild dilation of left lateral ventricle      Findings were discussed with VINEET MCDONALD on 2/4/2017 2:29 PM.      DX-CHEST-PORTABLE (1 VIEW)   Final Result      No significant change      The radiologist's interpretation of all radiological studies have been reviewed by me.        COURSE & MEDICAL DECISION MAKING  Nursing notes, VS, PMSFHx reviewed in chart.    1:06 PM Obtained and reviewed past medical records which  indicated newly diagnosed GBM status post craniotomy by Dr. Ventura. Has diabetes mellitus with acute and chronic renal failure. Dialysis catheter on the 7th status post craniotomy.     1:09 PM Patient seen and examined at bedside. Ordered for DX chest, CT head, urine microscopic, troponin and other lab results to evaluate.     The patient is showing a slight urinary tract infection with a white cell count of 10-20 WBCs per high power field and some significant proteinuria with moderate leukocyte Estrace. We will treat her for urinary infection. There is no leukocytosis or significant shift and she does have some anemia of chronic disease with a hemoglobin of 8. She also has renal insufficiency with a BUN and creatinine of 36 and 1.8 and she is at risk for further decompensation. Coagulation studies were unremarkable and troponin is normal.    We are hydrating her aggressively I spoke with family about the fact that CT of the head shows significant increase in size of her glioblastoma multiforme. It is likely she only has days to weeks to live and they are amenable to palliative care. CT specifically shows a shift of 9.3 mm right to left with significant increase in white matter edema and gray matter thickening of the right frontal and temporal lobes    I spent greater than 60 minutes speaking with the family about palliative care options.    2:43 PM EPR consulted with family about end of life care. They agree to consult with palliative care team.     3:33 PM I discussed the patient's case and the above findings with hospitalist, who agrees to admit the patient.     CRITICAL CARE  The very real possibilty of a deterioration of this patient's condition required the highest level of my preparedness for sudden, emergent intervention.  I provided critical care services, which included medication orders, frequent reevaluations of the patient's condition and response to treatment, ordering and reviewing test results, and  discussing the case with various consultants.  The critical care time associated with the care of the patient was 35 minutes. Review chart for interventions. This time is exclusive of any other billable procedures.         DISPOSITION:  Patient will be admitted to by hospitalist in guarded condition.        FINAL IMPRESSION  1. Somnolence    2. GBM (glioblastoma multiforme) (CMS-HCC)    3. Aspiration into airway, initial encounter    4. DNR (do not resuscitate)     palliative care discussion greater than 16 minutes  Critical Care Time of 35 minutes. This time is exclusive of any other billable procedures.         Mary MATHIAS (Ivan), am scribing for, and in the presence of, Avelino Funes M.D..  Electronically signed by: Mary Weldon (Ivan), 2/4/2017  Avelino MATHIAS M.D. personally performed the services described in this documentation, as scribed by Mary Weldon in my presence, and it is both accurate and complete.      The note accurately reflects work and decisions made by me.  Avelino Funes  2/4/2017  4:30 PM

## 2017-02-04 NOTE — ED NOTES
Med rec reviewed and complete per pt MAR at [facility].  Allergies reviewed.  Pt on 7 day course of Avelox 400mg ABX. Started on 02/03/17

## 2017-02-04 NOTE — IP AVS SNAPSHOT
Worksurfers Access Code: [email or web address]  Expires: 2/26/2017 11:05 AM    Your email address is not on file at [facility].  Email Addresses are required for you to sign up for Worksurfers, please contact [phone] to verify your personal information and to provide your email address prior to attempting to register for Worksurfers.    Yinanathen Stephens  [address] [place] [address]    Worksurfers  A secure, online tool to manage your health information     [facility]’s Worksurfers® is a secure, online tool that connects you to your personalized health information from the privacy of your home -- day or night - making it very easy for you to manage your healthcare. Once the activation process is completed, you can even access your medical information using the Worksurfers bello, which is available for free in the Apple Bello store or Google Play store.     To learn more about Worksurfers, visit [email or web address]    There are two levels of access available (as shown below):   My Chart Features  [facility] Primary Care Doctor [facility]  Specialists [facility]  Urgent  Care Non-[facility] Primary Care Doctor   Email your healthcare team securely and privately 24/7 X X X    Manage appointments: schedule your next appointment; view details of past/upcoming appointments X      Request prescription refills. X      View recent personal medical records, including lab and immunizations X X X X   View health record, including health history, allergies, medications X X X X   Read reports about your outpatient visits, procedures, consult and ER notes X X X X   See your discharge summary, which is a recap of your hospital and/or ER visit that includes your diagnosis, lab results, and care plan X X  X     How to register for Worksurfers:  Once your e-mail address has been verified, follow the following steps to sign up for Worksurfers.     1. Go to  [email or web address]  2. Click on the Sign Up Now box, which takes you to the New Member Sign Up page. You will  need to provide the following information:  a. Enter your [employer] Access Code exactly as it appears at the top of this page. (You will not need to use this code after you’ve completed the sign-up process. If you do not sign up before the expiration date, you must request a new code.)   b. Enter your date of birth.   c. Enter your home email address.   d. Click Submit, and follow the next screen’s instructions.  3. Create a Media [employer] ID. This will be your [employer]ream network login ID and cannot be changed, so think of one that is secure and easy to remember.  4. Create a [employer] password. You can change your password at any time.  5. Enter your Password Reset Question and Answer. This can be used at a later time if you forget your password.   6. Enter your e-mail address. This allows you to receive e-mail notifications when new information is available in [employer].  7. Click Sign Up. You can now view your health information.    For assistance activating your [employer] network account, call [phone]

## 2017-02-05 VITALS
HEART RATE: 84 BPM | WEIGHT: 160 LBS | OXYGEN SATURATION: 90 % | TEMPERATURE: 97.9 F | SYSTOLIC BLOOD PRESSURE: 145 MMHG | BODY MASS INDEX: 29.44 KG/M2 | RESPIRATION RATE: 16 BRPM | DIASTOLIC BLOOD PRESSURE: 72 MMHG | HEIGHT: 62 IN

## 2017-02-05 PROBLEM — C71.9 GLIOBLASTOMA (HCC): Status: ACTIVE | Noted: 2017-02-04

## 2017-02-05 PROBLEM — E11.9 DIABETES MELLITUS WITHOUT COMPLICATION (HCC): Status: ACTIVE | Noted: 2017-02-05

## 2017-02-05 PROBLEM — J69.0 ASPIRATION PNEUMONIA (HCC): Status: ACTIVE | Noted: 2017-02-05

## 2017-02-05 PROBLEM — N31.9 NEUROGENIC BLADDER: Status: ACTIVE | Noted: 2017-02-05

## 2017-02-05 LAB
ALBUMIN SERPL BCP-MCNC: 2.2 G/DL (ref 3.2–4.9)
ALBUMIN/GLOB SERPL: 1.2 G/DL
ALP SERPL-CCNC: 92 U/L (ref 30–99)
ALT SERPL-CCNC: 15 U/L (ref 2–50)
ANION GAP SERPL CALC-SCNC: 5 MMOL/L (ref 0–11.9)
ANISOCYTOSIS BLD QL SMEAR: ABNORMAL
AST SERPL-CCNC: 16 U/L (ref 12–45)
BACTERIA UR CULT: ABNORMAL
BASOPHILS # BLD AUTO: 0.9 % (ref 0–1.8)
BASOPHILS # BLD: 0.08 K/UL (ref 0–0.12)
BILIRUB SERPL-MCNC: 0.3 MG/DL (ref 0.1–1.5)
BUN SERPL-MCNC: 40 MG/DL (ref 8–22)
CALCIUM SERPL-MCNC: 8.1 MG/DL (ref 8.5–10.5)
CHLORIDE SERPL-SCNC: 101 MMOL/L (ref 96–112)
CO2 SERPL-SCNC: 31 MMOL/L (ref 20–33)
CREAT SERPL-MCNC: 1.98 MG/DL (ref 0.5–1.4)
EOSINOPHIL # BLD AUTO: 0 K/UL (ref 0–0.51)
EOSINOPHIL NFR BLD: 0 % (ref 0–6.9)
ERYTHROCYTE [DISTWIDTH] IN BLOOD BY AUTOMATED COUNT: 51 FL (ref 35.9–50)
GFR SERPL CREATININE-BSD FRML MDRD: 25 ML/MIN/1.73 M 2
GLOBULIN SER CALC-MCNC: 1.9 G/DL (ref 1.9–3.5)
GLUCOSE BLD-MCNC: 246 MG/DL (ref 65–99)
GLUCOSE SERPL-MCNC: 210 MG/DL (ref 65–99)
HCT VFR BLD AUTO: 24.2 % (ref 37–47)
HGB BLD-MCNC: 7.4 G/DL (ref 12–16)
LYMPHOCYTES # BLD AUTO: 0.08 K/UL (ref 1–4.8)
LYMPHOCYTES NFR BLD: 0.9 % (ref 22–41)
MANUAL DIFF BLD: NORMAL
MCH RBC QN AUTO: 30.2 PG (ref 27–33)
MCHC RBC AUTO-ENTMCNC: 30.6 G/DL (ref 33.6–35)
MCV RBC AUTO: 98.8 FL (ref 81.4–97.8)
METAMYELOCYTES NFR BLD MANUAL: 1.8 %
MICROCYTES BLD QL SMEAR: ABNORMAL
MONOCYTES # BLD AUTO: 0.08 K/UL (ref 0–0.85)
MONOCYTES NFR BLD AUTO: 0.9 % (ref 0–13.4)
MORPHOLOGY BLD-IMP: NORMAL
MYELOCYTES NFR BLD MANUAL: 0.9 %
NEUTROPHILS # BLD AUTO: 8.25 K/UL (ref 2–7.15)
NEUTROPHILS NFR BLD: 93.7 % (ref 44–72)
NRBC # BLD AUTO: 0 K/UL
NRBC BLD AUTO-RTO: 0 /100 WBC
PLATELET # BLD AUTO: 308 K/UL (ref 164–446)
PLATELET BLD QL SMEAR: NORMAL
PMV BLD AUTO: 9.8 FL (ref 9–12.9)
POLYCHROMASIA BLD QL SMEAR: NORMAL
POTASSIUM SERPL-SCNC: 3.7 MMOL/L (ref 3.6–5.5)
PROMYELOCYTES NFR BLD MANUAL: 0.9 %
PROT SERPL-MCNC: 4.1 G/DL (ref 6–8.2)
RBC # BLD AUTO: 2.45 M/UL (ref 4.2–5.4)
RBC BLD AUTO: PRESENT
SIGNIFICANT IND 70042: ABNORMAL
SIGNIFICANT IND 70042: ABNORMAL
SITE SITE: ABNORMAL
SODIUM SERPL-SCNC: 137 MMOL/L (ref 135–145)
SOURCE SOURCE: ABNORMAL
SOURCE SOURCE: ABNORMAL
WBC # BLD AUTO: 8.8 K/UL (ref 4.8–10.8)

## 2017-02-05 PROCEDURE — 700102 HCHG RX REV CODE 250 W/ 637 OVERRIDE(OP): Performed by: STUDENT IN AN ORGANIZED HEALTH CARE EDUCATION/TRAINING PROGRAM

## 2017-02-05 PROCEDURE — 700111 HCHG RX REV CODE 636 W/ 250 OVERRIDE (IP): Performed by: INTERNAL MEDICINE

## 2017-02-05 PROCEDURE — A9270 NON-COVERED ITEM OR SERVICE: HCPCS | Performed by: STUDENT IN AN ORGANIZED HEALTH CARE EDUCATION/TRAINING PROGRAM

## 2017-02-05 PROCEDURE — 99223 1ST HOSP IP/OBS HIGH 75: CPT | Performed by: INTERNAL MEDICINE

## 2017-02-05 PROCEDURE — 770009 HCHG ROOM/CARE - ONCOLOGY SEMI PRI*

## 2017-02-05 PROCEDURE — 700111 HCHG RX REV CODE 636 W/ 250 OVERRIDE (IP): Performed by: STUDENT IN AN ORGANIZED HEALTH CARE EDUCATION/TRAINING PROGRAM

## 2017-02-05 PROCEDURE — 82962 GLUCOSE BLOOD TEST: CPT

## 2017-02-05 PROCEDURE — 700105 HCHG RX REV CODE 258: Performed by: STUDENT IN AN ORGANIZED HEALTH CARE EDUCATION/TRAINING PROGRAM

## 2017-02-05 RX ORDER — ONDANSETRON 4 MG/1
4 TABLET, ORALLY DISINTEGRATING ORAL EVERY 8 HOURS PRN
Status: DISCONTINUED | OUTPATIENT
Start: 2017-02-05 | End: 2017-02-06 | Stop reason: HOSPADM

## 2017-02-05 RX ORDER — ONDANSETRON 2 MG/ML
8 INJECTION INTRAMUSCULAR; INTRAVENOUS EVERY 8 HOURS PRN
Status: DISCONTINUED | OUTPATIENT
Start: 2017-02-05 | End: 2017-02-06 | Stop reason: HOSPADM

## 2017-02-05 RX ORDER — POLYVINYL ALCOHOL 14 MG/ML
2 SOLUTION/ DROPS OPHTHALMIC EVERY 6 HOURS PRN
Status: DISCONTINUED | OUTPATIENT
Start: 2017-02-05 | End: 2017-02-06 | Stop reason: HOSPADM

## 2017-02-05 RX ORDER — LORAZEPAM 2 MG/ML
3-4 INJECTION INTRAMUSCULAR EVERY 4 HOURS PRN
Status: DISCONTINUED | OUTPATIENT
Start: 2017-02-05 | End: 2017-02-06 | Stop reason: HOSPADM

## 2017-02-05 RX ORDER — ONDANSETRON 2 MG/ML
4 INJECTION INTRAMUSCULAR; INTRAVENOUS EVERY 8 HOURS PRN
Status: DISCONTINUED | OUTPATIENT
Start: 2017-02-05 | End: 2017-02-06 | Stop reason: HOSPADM

## 2017-02-05 RX ORDER — LORAZEPAM 2 MG/ML
1-2 INJECTION INTRAMUSCULAR EVERY 4 HOURS PRN
Status: DISCONTINUED | OUTPATIENT
Start: 2017-02-05 | End: 2017-02-06 | Stop reason: HOSPADM

## 2017-02-05 RX ORDER — BISACODYL 10 MG
10 SUPPOSITORY, RECTAL RECTAL
Status: DISCONTINUED | OUTPATIENT
Start: 2017-02-05 | End: 2017-02-06 | Stop reason: HOSPADM

## 2017-02-05 RX ORDER — LORAZEPAM 2 MG/ML
5 INJECTION INTRAMUSCULAR EVERY 4 HOURS PRN
Status: DISCONTINUED | OUTPATIENT
Start: 2017-02-05 | End: 2017-02-06 | Stop reason: HOSPADM

## 2017-02-05 RX ORDER — ONDANSETRON 8 MG/1
8 TABLET, ORALLY DISINTEGRATING ORAL EVERY 8 HOURS PRN
Status: DISCONTINUED | OUTPATIENT
Start: 2017-02-05 | End: 2017-02-06 | Stop reason: HOSPADM

## 2017-02-05 RX ORDER — OXYCODONE HCL 20 MG/ML
5 CONCENTRATE, ORAL ORAL
Status: DISCONTINUED | OUTPATIENT
Start: 2017-02-05 | End: 2017-02-06 | Stop reason: HOSPADM

## 2017-02-05 RX ORDER — MORPHINE SULFATE 10 MG/ML
5-10 INJECTION, SOLUTION INTRAMUSCULAR; INTRAVENOUS
Status: DISCONTINUED | OUTPATIENT
Start: 2017-02-05 | End: 2017-02-06 | Stop reason: HOSPADM

## 2017-02-05 RX ORDER — SCOLOPAMINE TRANSDERMAL SYSTEM 1 MG/1
1 PATCH, EXTENDED RELEASE TRANSDERMAL
Status: DISCONTINUED | OUTPATIENT
Start: 2017-02-05 | End: 2017-02-06 | Stop reason: HOSPADM

## 2017-02-05 RX ORDER — OXYCODONE HCL 20 MG/ML
5 CONCENTRATE, ORAL ORAL
Status: DISCONTINUED | OUTPATIENT
Start: 2017-02-05 | End: 2017-02-05

## 2017-02-05 RX ADMIN — MORPHINE SULFATE 5 MG: 10 INJECTION INTRAVENOUS at 22:10

## 2017-02-05 RX ADMIN — MORPHINE SULFATE 5 MG: 10 INJECTION INTRAVENOUS at 10:48

## 2017-02-05 RX ADMIN — SCOPALAMINE 1 PATCH: 1 PATCH, EXTENDED RELEASE TRANSDERMAL at 10:48

## 2017-02-05 RX ADMIN — OXYCODONE HYDROCHLORIDE 5 MG: 100 SOLUTION ORAL at 10:16

## 2017-02-05 RX ADMIN — DEXAMETHASONE SODIUM PHOSPHATE 4 MG: 4 INJECTION, SOLUTION INTRAMUSCULAR; INTRAVENOUS at 12:24

## 2017-02-05 RX ADMIN — HEPARIN SODIUM 5000 UNITS: 5000 INJECTION, SOLUTION INTRAVENOUS; SUBCUTANEOUS at 05:32

## 2017-02-05 RX ADMIN — INSULIN LISPRO 3 UNITS: 100 INJECTION, SOLUTION INTRAVENOUS; SUBCUTANEOUS at 05:32

## 2017-02-05 RX ADMIN — DEXAMETHASONE SODIUM PHOSPHATE 4 MG: 4 INJECTION, SOLUTION INTRAMUSCULAR; INTRAVENOUS at 05:32

## 2017-02-05 RX ADMIN — AMPICILLIN SODIUM AND SULBACTAM SODIUM 1.5 G: 1; .5 INJECTION, POWDER, FOR SOLUTION INTRAMUSCULAR; INTRAVENOUS at 03:46

## 2017-02-05 RX ADMIN — DEXAMETHASONE SODIUM PHOSPHATE 4 MG: 4 INJECTION, SOLUTION INTRAMUSCULAR; INTRAVENOUS at 00:12

## 2017-02-05 NOTE — ASSESSMENT & PLAN NOTE
Likely due to DM and HTN  Still makes urine, no urgent need  HD MWF  K 3.6, BUN 36, Cr 1.8, Hb 8.0  -Discussed case with nephrology and updated with family decision of comfort care.

## 2017-02-05 NOTE — ASSESSMENT & PLAN NOTE
HbA1c 5.9, glucose 199  discontinue tube feeds  -discontinue home glargine, ISS, Hypoglycemia protocol  -Family has chosen comfort care

## 2017-02-05 NOTE — DIETARY
"  Nutrition Support TF Assessment - Female    Yina Stephens is a 71 y.o. female with admitting DX of Glioblastoma, Altered mental status  Pertinent History: HTN, dyslipidemia, hiatus hernia syndrome, DM, ESRD on HD  Allergies: Codeine; Dilaudid; Percocet; Percodan; Ultram; and Vicodin  Height: 157.5 cm (5' 2.01\")  Weight: 72.576 kg (160 lb)  Weight to Use in Calculations: 72.576 kg (160 lb)  Ideal Body Weight: 49.896 kg (110 lb)  Percent Ideal Body Weight: 145.5  Body mass index is 29.26 kg/(m^2).    Pertinent Labs: Glucose 210, BUN 40, Creatinine 1.98, GFR 25, Ca 8.1, Albumin 2.2  Last BM: 17  Pertinent Medications: unasyn, ascorbic acid, decadron, colace, senokot, lantus, SSI.  Pertinent Fluids: none listed  Surgery / Procedures: tunneled catheter, right temporal craniotomy for resection of brain tumor  Skin: surgical incision head     Estimated Needs: 1422.5 MSJ x 1.2  Total Calories / day: 1450 - 1600 (Calories / k - 22)  Total Grams Protein / day: 79 - 73 (Grams Protein / k.1 - 1.4)  Total Fluids ml / day: 1817 - 2178 ml (25-30 ml/kg)        Assessment / Evaluation: Patient admitted with altered mental status, not safe for a PO diet, TF protocol started. Patient with increased nutritional needs due to recent diagnosis of GBM s/p debulking, ESRD on HD, advanced age. The patient has elevated glucose levels likely due to history of DM, carbohydrate controlled formula appropriate. RD to continue to monitor renal labs.    Plan / Recommendation: Start Diabetisource at 25 ml/hr and advance per protocol to goal rate 55 ml/hr to provide 1584 kcals, 79 gm protein and 1082 ml free water. Fluids per MD; will need 735 - 1100 ml of free water to meet estimated needs. Obtain nutrition related labs. Obtain weights to monitor fluid and nutrition status. RD monitoring.      "

## 2017-02-05 NOTE — H&P
[facility] Admitting History and Physical    Name Yina Stephens       1945   Age/Sex 71 y.o. female   MRN [id number]   Code Status DNR/DNI     After 5PM or if no immediate response to page, please call for cross-coverage  Attending/Team: Dr. Alford/Manju Call [phone] to page   1st Call - Day Intern (R1):   Dr. Canchola 2nd Call - Day Sr. Resident (R2/R3):   Dr. Simental     Chief Complaint:  Altered mentation as noticed by family    HPI:  72 yo F w PMH recent diagnosis of GBM sp debulking, and ESRD on HD MWF, DM, HTN here for evaluation of altered mental status. History per family at bedside as patient was somnolent. Pt was diagnosed with GBM on  with partial bebulking by neurosurgery on . Pt was last seen yesterday by family after her HD treatment at approx 3:30 pm. At that time she was at her baseline, conversing, moving arms. She was visited today by her family who had noted that she was more somnolent and difficult to arouse. [facility], nurses were notifedShe was then transferred to the ED. Per ERP, she was able to follow some commands and remembered who he was (previous neighbors).    In the ED, WBC 9.25, Hb 8.0, K 3.6, BUN 36, Cr 1.8, trops neg, BNP 68, UA shows protein 600, mod LE, WBC 10-20, RBC 5-10, few bacteria. CT shows increase in edema suspicious for worsening tumor, 9.3mm R->L midline shift  With partial subfalcine herniation and mild dilation of L lateral ventricle. 2017 CXR shows possible pneumonia or aspiration of RLL.  CXR shows minimal change.    Review of Systems   Unable to perform ROS: mental status change   Per family: Headache, cough, sore throat    Past Medical History:   Past Medical History   Diagnosis Date   • Hypertension    • Dyslipidemia    • Hiatus hernia syndrome    • Pain    • Diabetes      oral meds   • Renal disorder      one kidney at 35%   • CATARACT      zuly iols     Past Surgical History:  Past Surgical History   Procedure Laterality  Date   • Nephrectomy laparoscopic  1980   • Pr appendectomy     • Simran by laparoscopy     • Hemorrhoidectomy  7/31/2013     Performed by Fransisco Ibarra M.D. at SURGERY [facility]   • Craniotomy stealth  1/15/2017     Procedure: CRANIOTOMY STEALTH FOR RESECTION OF RIGHT TEMPORAL BRAIN MASS;  Surgeon: Bry Ventura M.D.;  Location: SURGERY [facility];  Service:      Current Outpatient Medications:  Home Medications     Reviewed by aTsha Hendrix, Pharmacy Int (Pharmacy Intern) on 02/04/17 at 1529  Med List Status: Complete    Medication Last Dose Status    acetaminophen (TYLENOL) 325 MG Tab 2/1/2017 Active    alendronate (FOSAMAX) 35 MG tablet 1/31/2017 Active    ascorbic acid (VITAMIN C) 500 MG tablet 2/4/2017 Active    bethanechol (URECHOLINE) 25 MG Tab 2/4/2017 Active    dexamethasone (DECADRON) 1 MG Tab 2/4/2017 Active    doxazosin (CARDURA) 1 MG Tab 2/3/2017 Active    ferrous sulfate 325 (65 FE) MG tablet 2/4/2017 Active    insulin glargine (LANTUS) 100 UNIT/ML Solution 2/3/2017 Active    insulin lispro (HUMALOG) 100 UNIT/ML Solution 2/4/2017 Active    Lactobacillus Rhamnosus, GG, (CULTURELLE PO) unk Active    levetiracetam (KEPPRA) 100 MG/ML Solution 2/4/2017 Active    metoprolol (LOPRESSOR) 25 MG Tab 2/4/2017 Active    moxifloxacin (AVELOX) 400 MG Tab 2/3/2017 Active    simvastatin (ZOCOR) 10 MG Tab 2/3/2017 Active              Medication Allergy/Sensitivities:  Allergies   Allergen Reactions   • Codeine Vomiting   • Dilaudid [Hydromorphone Hcl] Vomiting   • Percocet [Oxycodone-Acetaminophen] Vomiting   • Percodan [Oxycodone-Aspirin] Vomiting   • Ultram [Tramadol] Vomiting   • Vicodin [Hydrocodone-Acetaminophen] Vomiting     Family History:  No family history on file.    Social History:  Social History     Social History   • Marital Status: Single     Spouse Name: N/A   • Number of Children: N/A   • Years of Education: N/A     Occupational History   • Not on file.     Social History Main Topics  "  • Smoking status: Never Smoker    • Smokeless tobacco: Not on file   • Alcohol Use: No   • Drug Use: No   • Sexual Activity: Not on file      Comment: single, no kids, retired     Other Topics Concern   • Not on file     Social History Narrative     Living situation: [facility]  PCP : Omer Park M.D.    Physical Exam     Filed Vitals:    02/04/17 1500 02/04/17 1530 02/04/17 1600 02/04/17 1616   BP:       Pulse: 93 80 93    Temp:       Resp: 13 12 12    Height:       Weight:    72.576 kg (160 lb)   SpO2: 94% 96% 98%      Body mass index is 29.26 kg/(m^2).  /79 mmHg  Pulse 93  Temp(Src) 37.1 °C (98.8 °F)  Resp 12  Ht 1.575 m (5' 2\")  Wt 72.576 kg (160 lb)  BMI 29.26 kg/m2  SpO2 98%  O2 therapy: Pulse Oximetry: 98 %, O2 Delivery: None (Room Air)    Physical Exam   Constitutional: No distress.   Frail, somnolent   HENT:   Head: Normocephalic.   NG tube, Purpura over right neck, cheek, temple   Eyes: Conjunctivae are normal. Pupils are equal, round, and reactive to light. Right eye exhibits no discharge. Left eye exhibits no discharge. No scleral icterus.   Cardiovascular: Normal rate, regular rhythm and normal heart sounds.    Pulmonary/Chest: No respiratory distress.   Difficult to assess   Abdominal: Soft. Bowel sounds are normal.   Musculoskeletal: She exhibits edema (1+).   Neurological:   Somnolent during interview, difficult to arouse with sternal rub, only oriented to self, difficult to assess cranial nerves. Able to grasp RUE but no response LUE. Does not move b/l LE. Reflexes b/l UE 2+ brachioradialis and biceps. Difficult to assess b/l LE.   Skin: Skin is warm and dry. She is not diaphoretic.   Nursing note and vitals reviewed.    Data Review       Old Records Request:   Completed  Current Records review and summary: Completed    Lab Data Review:  Recent Results (from the past 24 hour(s))   EKG (NOW)    Collection Time: 02/04/17 12:42 PM   Result Value Ref Range    Report       [facility] Emergency Dept.    Test Date:  2017  Pt Name:    SHELIA GOEL                Department: ER  MRN:        [id number]                      Room:       BL 18  Gender:     F                            Technician: [id number]  :        1945                   Requested By:ER TRIAGE PROTOCOL  Order #:    [id number]                    Reading MD:    Measurements  Intervals                                Axis  Rate:       94                           P:          53  MS:         148                          QRS:        -32  QRSD:       74                           T:          44  QT:         356  QTc:        446    Interpretive Statements  SINUS RHYTHM  LEFT AXIS DEVIATION  PROBABLE LEFT VENTRICULAR HYPERTROPHY  CONSIDER ANTERIOR INFARCT  Compared to ECG 2017 23:15:31  Left-axis deviation now present  Myocardial infarct finding now present  ST (T wave) deviation no longer present     CBC WITH DIFFERENTIAL    Collection Time: 17  1:12 PM   Result Value Ref Range    WBC 9.5 4.8 - 10.8 K/uL    RBC 2.62 (L) 4.20 - 5.40 M/uL    Hemoglobin 8.0 (L) 12.0 - 16.0 g/dL    Hematocrit 25.3 (L) 37.0 - 47.0 %    MCV 96.6 81.4 - 97.8 fL    MCH 30.5 27.0 - 33.0 pg    MCHC 31.6 (L) 33.6 - 35.0 g/dL    RDW 49.3 35.9 - 50.0 fL    Platelet Count 351 164 - 446 K/uL    MPV 9.8 9.0 - 12.9 fL    Nucleated RBC 0.20 /100 WBC    NRBC (Absolute) 0.02 K/uL    Neutrophils-Polys 83.50 (H) 44.00 - 72.00 %    Lymphocytes 6.90 (L) 22.00 - 41.00 %    Monocytes 2.60 0.00 - 13.40 %    Eosinophils 0.90 0.00 - 6.90 %    Basophils 0.90 0.00 - 1.80 %    Neutrophils (Absolute) 7.93 (H) 2.00 - 7.15 K/uL    Lymphs (Absolute) 0.66 (L) 1.00 - 4.80 K/uL    Monos (Absolute) 0.25 0.00 - 0.85 K/uL    Eos (Absolute) 0.09 0.00 - 0.51 K/uL    Baso (Absolute) 0.09 0.00 - 0.12 K/uL    Hypochromia 1+    COMP METABOLIC PANEL    Collection Time: 17  1:12 PM   Result Value Ref Range    Co2 31 20 - 33 mmol/L    Sodium 137 135 - 145 mmol/L     Potassium 3.6 3.6 - 5.5 mmol/L    Chloride 99 96 - 112 mmol/L    Anion Gap 7.0 0.0 - 11.9    Glucose 199 (H) 65 - 99 mg/dL    Bun 36 (H) 8 - 22 mg/dL    Creatinine 1.80 (H) 0.50 - 1.40 mg/dL    Calcium 8.2 (L) 8.5 - 10.5 mg/dL    AST(SGOT) 16 12 - 45 U/L    ALT(SGPT) 18 2 - 50 U/L    Alkaline Phosphatase 128 (H) 30 - 99 U/L    Total Bilirubin 0.3 0.1 - 1.5 mg/dL    Albumin 2.5 (L) 3.2 - 4.9 g/dL    Total Protein 4.6 (L) 6.0 - 8.2 g/dL    Globulin 2.1 1.9 - 3.5 g/dL    A-G Ratio 1.2 g/dL   TROPONIN    Collection Time: 02/04/17  1:12 PM   Result Value Ref Range    Troponin I 0.03 0.00 - 0.04 ng/mL   APTT    Collection Time: 02/04/17  1:12 PM   Result Value Ref Range    APTT 21.6 (L) 24.7 - 36.0 sec   PROTHROMBIN TIME    Collection Time: 02/04/17  1:12 PM   Result Value Ref Range    PT 14.1 12.0 - 14.6 sec    INR 1.06 0.87 - 1.13   DIFFERENTIAL MANUAL    Collection Time: 02/04/17  1:12 PM   Result Value Ref Range    Metamyelocytes 1.70 %    Myelocytes 3.50 %    Manual Diff Status PERFORMED    PERIPHERAL SMEAR REVIEW    Collection Time: 02/04/17  1:12 PM   Result Value Ref Range    Peripheral Smear Review see below    PLATELET ESTIMATE    Collection Time: 02/04/17  1:12 PM   Result Value Ref Range    Plt Estimation Normal    MORPHOLOGY    Collection Time: 02/04/17  1:12 PM   Result Value Ref Range    RBC Morphology Present     Polychromia 1+     Poikilocytosis 1+     Ovalocytes 1+     Basophilic Stippling Few    ESTIMATED GFR    Collection Time: 02/04/17  1:12 PM   Result Value Ref Range    GFR If  33 (A) >60 mL/min/1.73 m 2    GFR If Non  28 (A) >60 mL/min/1.73 m 2   BLOOD CULTURE,HOLD    Collection Time: 02/04/17  1:12 PM   Result Value Ref Range    Blood Culture Hold Collected    URINALYSIS CULTURE, IF INDICATED    Collection Time: 02/04/17  1:28 PM   Result Value Ref Range    Micro Urine Req Microscopic     Color Lt. Yellow     Character Sl Cloudy (A)     Specific Gravity 1.007  <1.035    Ph 8.5 (A) 5.0-8.0    Glucose Trace (A) Negative mg/dL    Ketones Negative Negative mg/dL    Protein 600 (A) Negative mg/dL    Bilirubin Negative Negative    Nitrite Negative Negative    Leukocyte Esterase Moderate (A) Negative    Occult Blood Negative Negative    Culture Indicated Yes UA Culture   URINE MICROSCOPIC (W/UA)    Collection Time: 02/04/17  1:28 PM   Result Value Ref Range    WBC 10-20 (A) /hpf    RBC 5-10 (A) /hpf    Bacteria Few (A) None /hpf    Trans Epithelial Cells Few /hpf    Mucous Threads Few /hpf     Imaging/Procedures Review:    ndependant Imaging Review: Completed  CT-HEAD W/O   Final Result      1.  Significant increase in white matter edema and gray matter thickening in the right frontal and temporal lobes highly suspicious for worsening tumor      2.  9.3 mm of right to left shift producing partial subfalcine herniation with mild dilation of left lateral ventricle      Findings were discussed with VINEET MCDONALD on 2/4/2017 2:29 PM.      DX-CHEST-PORTABLE (1 VIEW)   Final Result      No significant change         EKG:   EKG Independant Review: Completed  QTc:446, HR: 94, Normal Sinus Rhythm, no ST/T changes    Records reviewed and summarized in current documentation         Assessment/Plan     Altered mental status  Likely secondary to increase in size/swelling of GBM with midline shift vs aspiration pneumonia, electrolyte abnormalities  GBM diagnosed 1/6 with debulking by neurosurgery on 1/14  Last seen well after HD on 2/3, conversing, moving arms  Seen today 2/4 pt was more somnolent, difficult to arouse  VS: afebrile, HR 99, RR 12, -160/50s-70s  PE as above  CT shows increase in edema suspicious for worsening tumor, 9.3mm R->L midline shift  With partial subfalcine herniation and mild dilation of L lateral ventricle  2/2/2017 CXR shows possible pneumonia or aspiration of RLL  -consult radiation oncology, awaiting call back  -NPO, aspiration/seizure/fall precautions  -TF per  protocol, nutrition consult for any modications  -Family will discuss for hospice/palliative due to patient's poor prognosis  -possible comfort care  -PT/OT/SLT  -continue home keppra 500mg q12 hr  -start dexamethasone 10mg once with 4mg [id number]    Aspiration pneumonia  2/2/2017 CXR shows possible pneumonia or aspiration of RLL  Looks nontoxic, WBC 9.5, possible poor immune response due to age and medical condition  PE difficult to assess breath sounds, secretions from L side of mouth  -start unasyn, aspiration precautions    ?Neurogenic bladder  Has hawkins  -continue home bethanechol, doxazosin    ESRD  Still makes urine, no urgent need  HD MWF  K 3.6, BUN 36, Cr 1.8, Hb 8.0  -Nephrology Consult to schedule HD    DM  HbA1c 5.9, glucose 199  Currently on tube feeds  -nutrition consult  -continue home glargine, ISS, Hypoglycemia protocol    HTN  BP slightly high at 140-160/50s-70s  Continue home metoprolol, doxazosin    DLD  Continue home simvastatin    Anticipated Hospital stay:  >2 midnights    Quality Measures  Medications reviewed, Labs reviewed and Radiology images reviewed  Hawkins catheter: Unconscious / Sedated Patient on a Ventilator      DVT Prophylaxis: Heparin

## 2017-02-05 NOTE — PROGRESS NOTES
Assumed care of pt at 0645. Assessment completed. Pt lethargic and oriented x1. Oriented to self only.  Pt is a two person assist to turn. q2h turns in place.  Cortrak in place- will d/c at this time.  C/o pain in head, will medicate per MAR. Family is anxious. Understands plan of care. All needs met, bed in lowest locked position, call light within reach. Bed alarm on. Hourly rounding in place.

## 2017-02-05 NOTE — PROGRESS NOTES
Paged and updated purple team regarding transport tomorrow and need for DNR script for [facility] transport home.

## 2017-02-05 NOTE — DISCHARGE PLANNING
TC with Corazon from [facility]. Confirmed that they will be ready to deliver hospice supports to the pt after 1400  Tomorrow, 2/6/2017. Completed External Transport and [facility] E-Fill form and faxed to KAYLA, Imtiaz updated her by phone. Updated bedside RN.

## 2017-02-05 NOTE — DISCHARGE PLANNING
Received choice form from  [facility] Agustin Team Member at 1028   Referral sent to [facility] at 0943 on 02-05-17,

## 2017-02-05 NOTE — PROGRESS NOTES
[facility] Interval Note    Name Yina Stephens 1945   Age/Sex 71 y.o. female   MRN [id number]   Code Status COMFORT CARE     After 5PM or if no immediate response to page, please call for cross-coverage  Attending/Team: Dr. Prashanth Nunes Call [phone] to page   1st Call - Day Intern (R1):   Dr. Canchola 2nd Call - Day Sr. Resident (R2/R3):   Dr. Simental     Chief complaint/ reason for interval visit (Primary Diagnosis)   Altered mentation (increased somnolence, difficult to arouse) as noticed by family    Interval Problem Daily Status Update    Active Hospital Problems    Diagnosis   • Glioblastoma (CMS-HCC) [C71.9]   • Altered mental status [R41.82]     Patient appears to be comfortable today. Still somnolent, does not follow commands. Oriented to self but not place or year. Family at bedside, discussed prognosis and plan of care moving forward. They would like patient to be on comfort care.    Altered mental status with diagnosis of Glioblastoma (CMS-HCC)  GBM diagnosed  with debulking by neurosurgery on   CT shows increase in edema suspicious for worsening tumor, 9.3mm R->L midline shift  With partial subfalcine herniation and mild dilation of L lateral ventricle  2017 CXR shows possible pneumonia or aspiration of RLL,  CXR shows minimal change  -Family has chosen comfort care but would like to continue dexamethasone 4mg [id number]  -discontinue keppra 500mg q12 hr for seizure prophylaxis    Aspiration pneumonia (HCC)  2017 CXR shows possible pneumonia or aspiration of RLL,  CXR remains unchanged  Looks nontoxic, WBC 9.5, possible poor immune response due to age and medical condition  PE difficult to assess breath sounds, secretions from L side of mouth  -discontinue unasyn, aspiration precautions    ESRD (end stage renal disease) on dialysis (CMS-HCC)  Still makes urine, no urgent need  Will no longer have HD    Review of Systems   Unable to  perform ROS: mental acuity     Consultants/Specialty  Nephrology, Hospice/palliative, radiation oncology    Disposition  Inpatient for monitoring of AMS, possibly home hospice/CC    Quality Measures  Medications reviewed, Labs reviewed and Radiology images reviewed  Hillman catheter: Unconscious / Sedated Patient on a Ventilator      DVT Prophylaxis: Heparin            Physical Exam       Filed Vitals:    02/04/17 1927 02/04/17 2356 02/05/17 0358 02/05/17 0800   BP: 144/55 147/57 168/72 174/61   Pulse: 96 91 96 100   Temp: 36.6 °C (97.9 °F) 36.4 °C (97.6 °F) 36.7 °C (98 °F) 36.4 °C (97.5 °F)   Resp: 16 18 18 16   Height:       Weight:       SpO2: 96% 94% 96% 99%     Body mass index is 29.26 kg/(m^2). Weight: 72.576 kg (160 lb)  Oxygen Therapy:  Pulse Oximetry: 99 %, O2 (LPM): 0, O2 Delivery: None (Room Air)    Physical Exam   Constitutional: She is well-developed, well-nourished, and in no distress.   HENT:   Head: Normocephalic.   Purpura over L neck and cheek   Eyes: Right eye exhibits no discharge. Left eye exhibits no discharge.   Pulmonary/Chest: Breath sounds normal.   Abdominal: Soft. Bowel sounds are normal.   Musculoskeletal: She exhibits no edema.   Neurological:   Somnolent during interview, difficult to arouse with sternal rub, only oriented to self, difficult to assess cranial nerves. Able to grasp RUE but no response LUE. Does not move b/l LE.   Skin: She is not diaphoretic.   Nursing note and vitals reviewed.    Lab Data Review:      2/5/2017  7:32 AM    Recent Labs      02/03/17 0208 02/04/17   1312  02/04/17   2354   SODIUM  134*  137  137   POTASSIUM  3.9  3.6  3.7   CHLORIDE  97  99  101   CO2  31  31  31   BUN  53*  36*  40*   CREATININE  2.13*  1.80*  1.98*   MAGNESIUM   --   2.0   --    CALCIUM  7.9*  8.2*  8.1*       Recent Labs      02/03/17 0208 02/04/17   1312  02/04/17   2354   ALTSGPT  11  18  15   ASTSGOT  13  16  16   ALKPHOSPHAT  118*  128*  92   TBILIRUBIN  0.3  0.3  0.3   GLUCOSE   175*  199*  210*       Recent Labs      02/03/17   0208  02/04/17   1312  02/04/17   2354   RBC  2.62*  2.62*  2.45*   HEMOGLOBIN  8.1*  8.0*  7.4*   HEMATOCRIT  24.7*  25.3*  24.2*   PLATELETCT  272  351  308   PROTHROMBTM   --   14.1   --    APTT   --   21.6*   --    INR   --   1.06   --        Recent Labs      02/03/17 0208 02/04/17   1312 02/04/17   2354   WBC  7.6  9.5  8.8   NEUTSPOLYS  71.70  83.50*  93.70*   LYMPHOCYTES  13.90*  6.90*  0.90*   MONOCYTES  9.70  2.60  0.90   EOSINOPHILS  1.10  0.90  0.00   BASOPHILS  0.40  0.90  0.90   ASTSGOT  13  16  16   ALTSGPT  11  18  15   ALKPHOSPHAT  118*  128*  92   TBILIRUBIN  0.3  0.3  0.3     Assessment/Plan     Altered mental status with diagnosis of Glioblastoma (CMS-HCC)  Assessment & Plan  GBM diagnosed 1/6 with debulking by neurosurgery on 1/14  Last seen well after HD on 2/3, conversing, moving arms  Seen by family 2/4 pt was more somnolent, difficult to arouse  CT shows increase in edema suspicious for worsening tumor, 9.3mm R->L midline shift  With partial subfalcine herniation and mild dilation of L lateral ventricle  2/2/2017 CXR shows possible pneumonia or aspiration of RLL, 2/4 CXR shows minimal change  -NPO, aspiration/seizure/fall precautions  -Family decided comfort care but wants to continue dexamethasone 4mg [id number]    Aspiration pneumonia (HCC)  Assessment & Plan  2/2/2017 CXR shows possible pneumonia or aspiration of RLL, 2/4 CXR remains unchanged  Looks nontoxic, WBC 9.5, possible poor immune response due to age and medical condition  PE difficult to assess breath sounds, secretions from L side of mouth  -DC unasyn  -Family has chosen comfort care    Dyslipidemia  Assessment & Plan  DC simvastatin    Iron deficiency anemia  Assessment & Plan  Likely due to ESRD  Hb stable    ESRD (end stage renal disease) on dialysis (CMS-HCC)  Assessment & Plan  Likely due to DM and HTN  Still makes urine, no urgent need  HD MWF  K 3.6, BUN 36, Cr 1.8, Hb  8.0  -Discussed case with nephrology and updated with family decision of comfort care.    Neurogenic bladder  Assessment & Plan  Has hawkins  -discontinue home bethanechol, doxazosin    Diabetes mellitus without complication (HCC)  Assessment & Plan  HbA1c 5.9, glucose 199  discontinue tube feeds  -discontinue home glargine, ISS, Hypoglycemia protocol  -Family has chosen comfort care    HTN (hypertension)  Assessment & Plan  BP running high  -Family has chosen comfort care  -discontinue home metoprolol, doxazosin

## 2017-02-05 NOTE — DISCHARGE PLANNING
Received choice form from NEHEMIAH Velez at 1510. Transport forms sent to YAMINI spoke with Connie for transport home on hospice ([id number] [facility] [id number]) via [facility] on 2/6/17 at 1400. Notified NEHEMIAH Velez via phone.

## 2017-02-05 NOTE — PROGRESS NOTES
"       [facility] Medicine Admitting History and Physical    Name Yina Stephens       1945   Age/Sex 71 y.o. female   MRN [id number]   Code Status DNR/DNI     After 5PM or if no immediate response to page, please call for cross-coverage  Attending/Team: Prashanth/Manju Call [phone] to page   1st Call - Day Intern (R1):   True 2nd Call - Day Sr. Resident (R2/R3):   Kamille       Chief Complaint:  Patient's brother states, \"I couldn't wake her up.\"    HPI:  Ms. Yina Stephens is a 72yo female, with a PMHx significant for GBM, ESRD, dm, and htn, admitted via ED from a [facility] skilled nursing facility for alter LOC and witness coughing episode with possible aspiration.    Ms. Stephens was in her usual state of health, talking and conversing with family, yesterday until returning from dialysis around 3:30pm at which time she went to sleep.  This morning around 11am, the patient had a witnessed coughing episode and when family attempted to arouse the patient, they had difficulty doing so and patient was only oriented to person.  The family alerted the nurse at [facility].  A CXR was obtained at the facility suspicious for aspiration and the patient was sent to the ED.    The patient's normal state of health recently includes \"sleeping a lot,\" minimal bilateral leg movement, difficulty with swallowing, and cough for the last few days.  The patient is usually oriented to person, oriented to place periodically, and has difficulty with orientation to time. Family has noticed a gradual decline in overall mental status and concentration recently.    The patient was diagnosed with a UTI at the [facility] nursing facility 3 days ago and was treated with moxifloxacin.  According to the patient's brother, prior to the present episode of reduced LOC, the patient reportedly denied dysuria or increased urgency.    The patient was diagnosed with GBM on 2017 with a debulking procedure conducted on 2017.  " The patient is scheduled to begin radiation on Feb 14, 2017.    The patient's PMHx is also significant for ESRD with MWF dialysis for which she received dialysis yesterday.  The patient sees Dr. Rodriguez for nephrology.    According to the family, family is traveling to the hospital from [facility] to hold discussion about possibly transitioning patient to hospice care.  The patient's brother is legal power of .     Review of Systems   Constitutional: Positive for malaise/fatigue. Negative for fever, chills and diaphoresis.        Lack of movement in bilateral LE.   HENT: Negative for sore throat.         Reports dysphagia.   Respiratory: Positive for cough. Negative for shortness of breath.    Cardiovascular: Positive for leg swelling. Negative for chest pain and palpitations.   Gastrointestinal: Negative for abdominal pain.        Denies change in bowel habits.   Genitourinary: Negative for dysuria and urgency.   Skin: Negative for rash.   Neurological: Positive for focal weakness, loss of consciousness and weakness. Negative for dizziness and seizures.   Endo/Heme/Allergies: Bruises/bleeds easily.             Past Medical History:   Past Medical History   Diagnosis Date   • Hypertension    • Dyslipidemia    • Hiatus hernia syndrome    • Pain    • Diabetes      oral meds   • Renal disorder      one kidney at 35%   • CATARACT      zuly iols       Past Surgical History:  Past Surgical History   Procedure Laterality Date   • Nephrectomy laparoscopic  1980   • Pr appendectomy     • Simran by laparoscopy     • Hemorrhoidectomy  7/31/2013     Performed by Fransisco Ibarra M.D. at SURGERY [facility]   • Craniotomy stealth  1/15/2017     Procedure: CRANIOTOMY STEALTH FOR RESECTION OF RIGHT TEMPORAL BRAIN MASS;  Surgeon: Bry Ventura M.D.;  Location: SURGERY [facility];  Service:        Current Outpatient Medications:  Home Medications     Reviewed by Tasha Hendrix, Pharmacy Int (Pharmacy  Intern) on 02/04/17 at 1529  Med List Status: Complete    Medication Last Dose Status    acetaminophen (TYLENOL) 325 MG Tab 2/1/2017 Active    alendronate (FOSAMAX) 35 MG tablet 1/31/2017 Active    ascorbic acid (VITAMIN C) 500 MG tablet 2/4/2017 Active    bethanechol (URECHOLINE) 25 MG Tab 2/4/2017 Active    dexamethasone (DECADRON) 1 MG Tab 2/4/2017 Active    doxazosin (CARDURA) 1 MG Tab 2/3/2017 Active    ferrous sulfate 325 (65 FE) MG tablet 2/4/2017 Active    insulin glargine (LANTUS) 100 UNIT/ML Solution 2/3/2017 Active    insulin lispro (HUMALOG) 100 UNIT/ML Solution 2/4/2017 Active    Lactobacillus Rhamnosus, GG, (CULTURELLE PO) unk Active    levetiracetam (KEPPRA) 100 MG/ML Solution 2/4/2017 Active    metoprolol (LOPRESSOR) 25 MG Tab 2/4/2017 Active    moxifloxacin (AVELOX) 400 MG Tab 2/3/2017 Active    simvastatin (ZOCOR) 10 MG Tab 2/3/2017 Active                Medication Allergy/Sensitivities:  Allergies   Allergen Reactions   • Codeine Vomiting   • Dilaudid [Hydromorphone Hcl] Vomiting   • Percocet [Oxycodone-Acetaminophen] Vomiting   • Percodan [Oxycodone-Aspirin] Vomiting   • Ultram [Tramadol] Vomiting   • Vicodin [Hydrocodone-Acetaminophen] Vomiting         Family History:  Patient's brother reports history of bone cancer in patient's brother.  He also reports dm II in two brothers.    Social History:  Social History     Social History   • Marital Status: Single     Spouse Name: N/A   • Number of Children: N/A   • Years of Education: N/A     Occupational History   • Not on file.     Social History Main Topics   • Smoking status: Never Smoker    • Smokeless tobacco: Not on file   • Alcohol Use: No   • Drug Use: No   • Sexual Activity: Not on file      Comment: single, no kids, retired     Other Topics Concern   • Not on file     Social History Narrative     Living situation: Skilled Nursing Facility  PCP : Omer Park M.D.      Physical Exam     Filed Vitals:    02/04/17 1500 02/04/17 1530  "02/04/17 1600 02/04/17 1616   BP:       Pulse: 93 80 93    Temp:       Resp: 13 12 12    Height:       Weight:    72.576 kg (160 lb)   SpO2: 94% 96% 98%      Body mass index is 29.26 kg/(m^2).  /79 mmHg  Pulse 93  Temp(Src) 37.1 °C (98.8 °F)  Resp 12  Ht 1.575 m (5' 2\")  Wt 72.576 kg (160 lb)  BMI 29.26 kg/m2  SpO2 98%  O2 therapy: Pulse Oximetry: 98 %, O2 Delivery: None (Room Air)    Physical Exam   Constitutional:   Patient is well-developed female, lying in bed, lethargic, not easy to arouse.   HENT:   Head: Normocephalic.   Patient has haired shaved on right side. Patient has preauricular bruising on right side extending inferiorly onto lateral neck.   Eyes: Conjunctivae are normal. Pupils are equal, round, and reactive to light. Right eye exhibits no discharge. Left eye exhibits no discharge. No scleral icterus.   Neck: Neck supple. No JVD present. No tracheal deviation present.   Cardiovascular: Normal rate, regular rhythm, normal heart sounds and intact distal pulses.  Exam reveals no gallop and no friction rub.    No murmur heard.  Pulmonary/Chest: Effort normal.   CTAB in upper lobes during anterior auscultation.  Unable to assess additional breath sounds due to decreased LOC with patient supine.   Abdominal: Soft. Bowel sounds are normal. She exhibits no distension and no mass. There is no tenderness.   Genitourinary:   Patient has hawkins placed.   Musculoskeletal: She exhibits edema.   Patient right hand  weak.   Neurological:   Lethargic, not easy to arouse.  Oriented to person and place, not time or situation.  Unable to assess CN due to decreased LOC.  Patient opened eyes to repeated command, converses but shows some disorientation, and obeys some commands.  Biceps and brachioradialis reflexes 2+ bilaterally.  Patient did not move bilat LEs, had weak right hand , and no left hand  in respone to command.   Skin: Skin is warm and dry.             Data Review       Old Records " Request:   Deferred  Current Records review and summary: Completed    Lab Data Review:  Recent Results (from the past 24 hour(s))   EKG (NOW)    Collection Time: 17 12:42 PM   Result Value Ref Range    Report       [facility] Emergency Dept.    Test Date:  2017  Pt Name:    SHELIA GOEL                Department: ER  MRN:        [id number]                      Room:       [facility]  Gender:     F                            Technician: [id number]  :        1945                   Requested By:ER TRIAGE PROTOCOL  Order #:    [id number]                    Reading MD:    Measurements  Intervals                                Axis  Rate:       94                           P:          53  IL:         148                          QRS:        -32  QRSD:       74                           T:          44  QT:         356  QTc:        446    Interpretive Statements  SINUS RHYTHM  LEFT AXIS DEVIATION  PROBABLE LEFT VENTRICULAR HYPERTROPHY  CONSIDER ANTERIOR INFARCT  Compared to ECG 2017 23:15:31  Left-axis deviation now present  Myocardial infarct finding now present  ST (T wave) deviation no longer present     CBC WITH DIFFERENTIAL    Collection Time: 17  1:12 PM   Result Value Ref Range    WBC 9.5 4.8 - 10.8 K/uL    RBC 2.62 (L) 4.20 - 5.40 M/uL    Hemoglobin 8.0 (L) 12.0 - 16.0 g/dL    Hematocrit 25.3 (L) 37.0 - 47.0 %    MCV 96.6 81.4 - 97.8 fL    MCH 30.5 27.0 - 33.0 pg    MCHC 31.6 (L) 33.6 - 35.0 g/dL    RDW 49.3 35.9 - 50.0 fL    Platelet Count 351 164 - 446 K/uL    MPV 9.8 9.0 - 12.9 fL    Nucleated RBC 0.20 /100 WBC    NRBC (Absolute) 0.02 K/uL    Neutrophils-Polys 83.50 (H) 44.00 - 72.00 %    Lymphocytes 6.90 (L) 22.00 - 41.00 %    Monocytes 2.60 0.00 - 13.40 %    Eosinophils 0.90 0.00 - 6.90 %    Basophils 0.90 0.00 - 1.80 %    Neutrophils (Absolute) 7.93 (H) 2.00 - 7.15 K/uL    Lymphs (Absolute) 0.66 (L) 1.00 - 4.80 K/uL    Monos (Absolute) 0.25 0.00 - 0.85 K/uL    Eos  (Absolute) 0.09 0.00 - 0.51 K/uL    Baso (Absolute) 0.09 0.00 - 0.12 K/uL    Hypochromia 1+    COMP METABOLIC PANEL    Collection Time: 02/04/17  1:12 PM   Result Value Ref Range    Co2 31 20 - 33 mmol/L    Sodium 137 135 - 145 mmol/L    Potassium 3.6 3.6 - 5.5 mmol/L    Chloride 99 96 - 112 mmol/L    Anion Gap 7.0 0.0 - 11.9    Glucose 199 (H) 65 - 99 mg/dL    Bun 36 (H) 8 - 22 mg/dL    Creatinine 1.80 (H) 0.50 - 1.40 mg/dL    Calcium 8.2 (L) 8.5 - 10.5 mg/dL    AST(SGOT) 16 12 - 45 U/L    ALT(SGPT) 18 2 - 50 U/L    Alkaline Phosphatase 128 (H) 30 - 99 U/L    Total Bilirubin 0.3 0.1 - 1.5 mg/dL    Albumin 2.5 (L) 3.2 - 4.9 g/dL    Total Protein 4.6 (L) 6.0 - 8.2 g/dL    Globulin 2.1 1.9 - 3.5 g/dL    A-G Ratio 1.2 g/dL   TROPONIN    Collection Time: 02/04/17  1:12 PM   Result Value Ref Range    Troponin I 0.03 0.00 - 0.04 ng/mL   APTT    Collection Time: 02/04/17  1:12 PM   Result Value Ref Range    APTT 21.6 (L) 24.7 - 36.0 sec   PROTHROMBIN TIME    Collection Time: 02/04/17  1:12 PM   Result Value Ref Range    PT 14.1 12.0 - 14.6 sec    INR 1.06 0.87 - 1.13   DIFFERENTIAL MANUAL    Collection Time: 02/04/17  1:12 PM   Result Value Ref Range    Metamyelocytes 1.70 %    Myelocytes 3.50 %    Manual Diff Status PERFORMED    PERIPHERAL SMEAR REVIEW    Collection Time: 02/04/17  1:12 PM   Result Value Ref Range    Peripheral Smear Review see below    PLATELET ESTIMATE    Collection Time: 02/04/17  1:12 PM   Result Value Ref Range    Plt Estimation Normal    MORPHOLOGY    Collection Time: 02/04/17  1:12 PM   Result Value Ref Range    RBC Morphology Present     Polychromia 1+     Poikilocytosis 1+     Ovalocytes 1+     Basophilic Stippling Few    ESTIMATED GFR    Collection Time: 02/04/17  1:12 PM   Result Value Ref Range    GFR If  33 (A) >60 mL/min/1.73 m 2    GFR If Non  28 (A) >60 mL/min/1.73 m 2   BLOOD CULTURE,HOLD    Collection Time: 02/04/17  1:12 PM   Result Value Ref Range     Blood Culture Hold Collected    URINALYSIS CULTURE, IF INDICATED    Collection Time: 02/04/17  1:28 PM   Result Value Ref Range    Micro Urine Req Microscopic     Color Lt. Yellow     Character Sl Cloudy (A)     Specific Gravity 1.007 <1.035    Ph 8.5 (A) 5.0-8.0    Glucose Trace (A) Negative mg/dL    Ketones Negative Negative mg/dL    Protein 600 (A) Negative mg/dL    Bilirubin Negative Negative    Nitrite Negative Negative    Leukocyte Esterase Moderate (A) Negative    Occult Blood Negative Negative    Culture Indicated Yes UA Culture   URINE MICROSCOPIC (W/UA)    Collection Time: 02/04/17  1:28 PM   Result Value Ref Range    WBC 10-20 (A) /hpf    RBC 5-10 (A) /hpf    Bacteria Few (A) None /hpf    Trans Epithelial Cells Few /hpf    Mucous Threads Few /hpf       Imaging/Procedures Review:    ndependant Imaging Review: Completed  CT-HEAD W/O   Final Result      1.  Significant increase in white matter edema and gray matter thickening in the right frontal and temporal lobes highly suspicious for worsening tumor      2.  9.3 mm of right to left shift producing partial subfalcine herniation with mild dilation of left lateral ventricle      Findings were discussed with VINEET MCDONALD on 2/4/2017 2:29 PM.      DX-CHEST-PORTABLE (1 VIEW)   Final Result      No significant change               EKG:   EKG Independant Review: Completed  QTc:446, HR: 94, Normal Sinus Rhythm, no ST/T changes    EKG evidence of left axis deviation.    Records reviewed and summarized in current documentation             Assessment/Plan     1) *Altered LOC secondary to GBM mass effect vs. Aspiration pneumonia vs. Medications vs. ACS vs. Electrolyte inbalance.  Assessment and Plan   -Patient demonstrates altered LOC from baseline based upon history obtained from patient's family.  Physical exam reveals difficulty arousing patient with limited verbal interaction.  Altered LOC is likely due to mass effect of GBM increasing in size.  Current head CT  today shows 9.3mm mid-line deviation compared to 3mm shift on 1/16/2017. Aspiration pneumonia may be present but etiology may be due to, rather than causing, decreased LOC. Patient is currently on keppra for seizure prophylaxis.  While keppra may result in decreased LOC, the level has been previously titrated.  ACS is unlikely due to normal EKG without associated findings on history, physical exam, and vitals. Electrolyte abnormality is unlikely based upon lab findings.  -Start dexamethasone 10mg IV one time.  -Start dexamethasone push 4mg q6h.  -Start dexamethasone 1mg tablet via NG q12h.  -Consult radiation oncology, awaiting call back.    2) Aspiration Pneumonia  Assessment and Plan   -Patient has Coretrack due to history of dysphagia.  Along with decreased LOC, patient is high risk for aspiration.  CXR results show infiltrate in right middle lobe consistent with supine position aspiration.  CXR compared to previous from one month ago.  WBC increased from yesterday (7.6->9.5) with left shift.  -Start ampicillin/sulbactam 1.5g IV push q6h (day 1 of abx).    3) GBM  Assessment and Plan   -Patient GBM shows progression from last MRI with increasing midline shift. Location of GBM on right hemisphere consistent with left sided weakness seen on physical exam.  -Start Keppra 500mg per NG q12h for seizure prophylaxis.  -Given patient's current condition and progression of GBM, family to discuss transitioning patient to hospice care.  Consult family and discuss prognosis and treatment options after family has had time to discuss.  As family is traveling in from out of town, possible family consultation on Monday.  -Patient is DNR/DNI.  -Consult PT/OT/SLP if resuming care after family consultation.  -Start tylenol for pain as needed.  Morphine, if needed, due to prognosis of patient and allergies to other pain medications.    4) Acute Kidney Injury  Assessment and Plan   -Patient dehydration likely due to dehydration.   Patient bicarb and Cl levels suggest volume contraction, BUN:Cr ratio consistent with prerenal azotemia likely from hypovolemia, and dehydration may be secondary to possible over treatment on dialysis.  -Start gentle IV fluids, 75mL/hr NS.  -Continue to monitor    5) ESRD with MWF dialysis  Assessment and Plan   -Patient scheduled with MWF dialysis. Patient received dialysis yesteday.  -Patient K at 3.5, Order Mg level and replete to 4 if Mg level wnl.  -Patient albumin 2.5 consistent with ESRD.  -Patient corrected Ca 9.4.  -Consult nephrology, non-urgently, for Monday dialysis.  -Continue to monitor.    6) Diabetes mellitus  Assessment and Plan   -Patient to follow hospital protocol regarding diabetes management.  -Monitor for hypoglycemia.    7) Hypertension  Assessment and Plan   -Patient to continue doxazosin and metoprolol.  -Continue to monitor.      Anticipated Hospital stay:  >2 midnights        Quality Measures  EKG reviewed, Labs reviewed, Medications reviewed and Radiology images reviewed  Hillman catheter: Neurogenic Bladder

## 2017-02-05 NOTE — PROGRESS NOTES
Received from ER.  Patient lethargic,  Barely arousable.  VSS.  No s/s of pain or distress noted.  Cortrak in place from SNF.  Hillman in place from SNF.  HD catheter in place.  Generalized bruising.  Patient incontinent of stool on arrival.  Multiple family members at bedside, tearful and anxious.  Bed alarm in place.    Called Dr. Canchola/INDIO kong team.  Xray ordered to confirm cortrak placement prior to using.  Tube feed to start per protocol, RD consult in AM.  IVF d/c as patient gets dialysis.

## 2017-02-05 NOTE — ASSESSMENT & PLAN NOTE
2/2/2017 CXR shows possible pneumonia or aspiration of RLL, 2/4 CXR remains unchanged  Looks nontoxic, WBC 9.5, possible poor immune response due to age and medical condition  PE difficult to assess breath sounds, secretions from L side of mouth  -DC unasyn  -Family has chosen comfort care

## 2017-02-05 NOTE — CONSULTS
"Reason for PC Consult: Advance Care Planning    Consulted by: Dr. Canchola ([facility] Team)    Assessment:  General: 71 year old female transferred from [facility] for AMS and admitted 2/4/17. Diagnosed with glioblastoma 1/6/17 s/p debulking by neurosurgery on 1/14/17. Also with aspiration pneumonia and neurogenic bladder - hawkins catheter present. Past medical history includes ESRD on HD MWF, DM2, HTN, DLD, and iron deficiency anemia. Patient sleeping and not rising per family. DPOA Lucy, alternate DPOA Juan and two additional family members bedside.     Dyspnea: Calm unlabored breathing; 99% on room air.   Last BM: 02/04/17.    Pain: Slightly furrowed brow.  Depression: Unable to determine.       Spiritual:  Is Yarsani or spirituality important for coping with this illness? Unable to determine due to altered mental status - Jehovah's witness; family hopeful to get patient home on hospice ASAP - discussed spiritual care offered by hospice.   Has a  or spiritual provider visit been requested? No    Palliative Performance Scale: 10%    Advance Directive: On file. DPOA & Directives in place.  DPOA: Lucy [place] [phone]  POLST: None on file      Code Status: DNR; family want comfort care.    Outcome:  Received call from bedside RN notifying me family is eager to talk; Dr. Canchola also stopped by office and provided background on situation. Introduced myself and role of palliative care to patient's family. Discussed goals of care. Family reports patient, \"Want's to die at home in her own bed.\" Education about hospice care provided; family would like to take patient home on hospice. Discussed referral process as family is hopeful to get patient home sooner rather than later. Reviewed choice form. Family would like [facility]. Choice form obtained and faxed to [facility] at #[phone]. Provided family with typical timeframe for referral process/discharge. All questions answered including about \"oral insulin.\" Discussed that " maintenance medications that are not related to symptom control are generally discontinued but encouraged them to write down specific questions for [facility]. Family would like to continue steroid use as they feel it helps with patient's pain. Explained comfort care and offered as an option while patient is still in the hospital (no tests, pokes, removal of NG tube, etc.). They would like to proceed with comfort care. All questions answered. Provided business cards and encouraged family to call with any questions, needs, or concerns.     Updated: Bedside RN, Dr. Canchola, and NEHEMIAH Velez via .    Plan: Transition to comfort care; home with [facility].    Thank you for allowing Palliative Care to participate in this patient's care. Please feel free to call [phone] with any questions or concerns.

## 2017-02-05 NOTE — ASSESSMENT & PLAN NOTE
GBM diagnosed 1/6 with debulking by neurosurgery on 1/14  Last seen well after HD on 2/3, conversing, moving arms  Seen by family 2/4 pt was more somnolent, difficult to arouse  CT shows increase in edema suspicious for worsening tumor, 9.3mm R->L midline shift  With partial subfalcine herniation and mild dilation of L lateral ventricle  2/2/2017 CXR shows possible pneumonia or aspiration of RLL, 2/4 CXR shows minimal change  -NPO, aspiration/seizure/fall precautions  -Family decided comfort care but wants to continue dexamethasone 4mg [id number]

## 2017-02-05 NOTE — PROGRESS NOTES
Assumed care of pt at 1900. Bedside report received from kuldeep RN. Pt lethargic and barely arousable. VSS. No s/s of pain. Cortrak in place, awaiting xray placement verification before use. Hillman in place from SNF. HD catheter in place. Generalized bruising present. Incontinent of stool. Family at bedside, bed alarm in place and sounding, Q2 turns. POC discussed, call light in reach, pt family calls for assistance, all needs met at this time.     2300  Cortrak verified by xray, tube feeding initiated at this time per order. Running Fibersource HN at 25mL/hr until RD consult.

## 2017-02-06 PROCEDURE — 700111 HCHG RX REV CODE 636 W/ 250 OVERRIDE (IP): Performed by: INTERNAL MEDICINE

## 2017-02-06 PROCEDURE — 700111 HCHG RX REV CODE 636 W/ 250 OVERRIDE (IP): Performed by: STUDENT IN AN ORGANIZED HEALTH CARE EDUCATION/TRAINING PROGRAM

## 2017-02-06 RX ORDER — POLYVINYL ALCOHOL 14 MG/ML
2 SOLUTION/ DROPS OPHTHALMIC EVERY 6 HOURS PRN
Qty: 1 BOTTLE | Refills: 3 | Status: SHIPPED | DISCHARGE
Start: 2017-02-06

## 2017-02-06 RX ORDER — ONDANSETRON 2 MG/ML
8 INJECTION INTRAMUSCULAR; INTRAVENOUS EVERY 8 HOURS PRN
Qty: 15 ML | Status: SHIPPED | DISCHARGE
Start: 2017-02-06

## 2017-02-06 RX ORDER — BISACODYL 10 MG
10 SUPPOSITORY, RECTAL RECTAL
Refills: 0 | Status: SHIPPED | DISCHARGE
Start: 2017-02-06

## 2017-02-06 RX ORDER — LORAZEPAM 2 MG/ML
5 INJECTION INTRAMUSCULAR EVERY 4 HOURS PRN
Refills: 0 | Status: SHIPPED | DISCHARGE
Start: 2017-02-06

## 2017-02-06 RX ORDER — MORPHINE SULFATE 10 MG/ML
5-10 INJECTION, SOLUTION INTRAMUSCULAR; INTRAVENOUS
Status: SHIPPED | DISCHARGE
Start: 2017-02-06

## 2017-02-06 RX ORDER — LORAZEPAM 2 MG/ML
1-2 INJECTION INTRAMUSCULAR EVERY 4 HOURS PRN
Refills: 0 | Status: SHIPPED | DISCHARGE
Start: 2017-02-06

## 2017-02-06 RX ORDER — SCOLOPAMINE TRANSDERMAL SYSTEM 1 MG/1
1 PATCH, EXTENDED RELEASE TRANSDERMAL
Qty: 4 PATCH | Refills: 3 | Status: SHIPPED | DISCHARGE
Start: 2017-02-06

## 2017-02-06 RX ORDER — ONDANSETRON 4 MG/1
4 TABLET, ORALLY DISINTEGRATING ORAL EVERY 8 HOURS PRN
Qty: 10 TAB | Refills: 0 | Status: SHIPPED | DISCHARGE
Start: 2017-02-06

## 2017-02-06 RX ORDER — LORAZEPAM 2 MG/ML
3-4 INJECTION INTRAMUSCULAR EVERY 4 HOURS PRN
Refills: 0 | Status: SHIPPED | DISCHARGE
Start: 2017-02-06

## 2017-02-06 RX ORDER — ONDANSETRON 2 MG/ML
4 INJECTION INTRAMUSCULAR; INTRAVENOUS EVERY 8 HOURS PRN
Qty: 15 ML | Status: SHIPPED | DISCHARGE
Start: 2017-02-06

## 2017-02-06 RX ORDER — OXYCODONE HCL 20 MG/ML
5 CONCENTRATE, ORAL ORAL
Qty: 30 ML | Refills: 0 | Status: SHIPPED | DISCHARGE
Start: 2017-02-06

## 2017-02-06 RX ADMIN — DEXAMETHASONE SODIUM PHOSPHATE 4 MG: 4 INJECTION, SOLUTION INTRAMUSCULAR; INTRAVENOUS at 00:09

## 2017-02-06 RX ADMIN — DEXAMETHASONE SODIUM PHOSPHATE 4 MG: 4 INJECTION, SOLUTION INTRAMUSCULAR; INTRAVENOUS at 05:31

## 2017-02-06 RX ADMIN — DEXAMETHASONE SODIUM PHOSPHATE 4 MG: 4 INJECTION, SOLUTION INTRAMUSCULAR; INTRAVENOUS at 11:57

## 2017-02-06 RX ADMIN — MORPHINE SULFATE 5 MG: 10 INJECTION INTRAVENOUS at 11:52

## 2017-02-06 ASSESSMENT — PAIN SCALES - GENERAL: PAINLEVEL_OUTOF10: 0

## 2017-02-06 ASSESSMENT — LIFESTYLE VARIABLES: EVER_SMOKED: NEVER

## 2017-02-06 NOTE — PROGRESS NOTES
Assumed care of pt at 0645. Assessment completed. Pt lethargic. Oriented to self only. Pt arouses to verbal stimuli, answers some questions regarding pain and positioning. Pt is a two person assist to turn. Denies pain at this time. Family at bedside. MD updated on need for POLST, will return with signed POLST for family prior to d/c. Understands plan of care. All needs met, bed in lowest locked position, call light within reach. Bed alarm on. Hourly rounding in place.

## 2017-02-06 NOTE — CARE PLAN
Problem: Safety  Goal: Will remain free from falls  Outcome: PROGRESSING AS EXPECTED  Pt has bed alarm in place and is sounding appropriately. Family is at bedside. Call light within reach.     Problem: Pain Management  Goal: Pain level will decrease to patient’s comfort goal  Outcome: PROGRESSING AS EXPECTED  Pt is receiving IV morphine as needed and when available for pain control.

## 2017-02-06 NOTE — PROGRESS NOTES
[facility]. PIV removed. Discharge instructions discussed, pt verbalized understanding. Copy given to pt and placed in chart. All belongings and prescriptions with pt upon discharge. No s/s of distress noted. No c/o pain upon discharge.

## 2017-02-06 NOTE — DISCHARGE PLANNING
Received voicemail form Nayana with [facility] with transport auth. [facility] transport auth [id number]. Notified YAMINI spoke with Arnav.

## 2017-02-06 NOTE — DISCHARGE SUMMARY
[facility] Internal Medicine Discharge Summary      Admit Date:  2/4/2017       Discharge Date:   2/6/17    Service:   [facility] Internal Medicine Purple Team  Attending Physician(s):   Dr. Alford       Senior Resident(s):   Dr. Simental  Justin Resident(s):   , Dr. Wright      Primary Diagnosis:   • Altered mental status with diagnosis of Glioblastoma (CMS-HCC) [C71.9]     Secondary Diagnoses:                Active Hospital Problems    Diagnosis   • Altered mental status with diagnosis of Glioblastoma (CMS-HCC) [C71.9]   • Aspiration pneumonia (HCC) [J69.0]   • HTN (hypertension) [I10]   • Neurogenic bladder [N31.9]   • Diabetes mellitus without complication (HCC) [E11.9]   • ESRD (end stage renal disease) on dialysis (CMS-HCC) [N18.6, Z99.2]   • Iron deficiency anemia [D50.9]   • Dyslipidemia [E78.5]       Hospital Summary (Brief Narrative):       Patient is a 71-year-old female with a past medical history of Diabetes Mellitus type 2, Hypertension, End Stage Renal Disease and Glioblastoma multiforme ( diagnosis 1/6/17) status post right craniotomy by Dr. Ventura(1/15/17) transferred from [facility] to ED with altered mental status. Patient was found to have progression of her disease(GBM) based on findings from her CT scan which showed worsening edema and worsening tumor size with a 9.3mm with  Right to Left shift. Last MRI brain shows a 5 mm of LEFT-to-RIGHT midline shift. Patient was started on decadron for cerebral edema. Since she is also at high risk for aspiration and her CXR showed infiltrates, she was empirically started on Unasyn. Based on her poor prognosis, a discussion with family and palliative team was made. Decision was made to make patient comfort care. Plan to discharge patient with home hospice care.     Patient /Hospital Summary (Details -- Problem Oriented) :          # Altered mental status with diagnosis of Glioblastoma (CMS-HCC)  Diagnosis GBM diagnosed 1/6/17 s/p debulking by neurosurgery on  1/14. Gradual decline in overall status including mentation. Readmission 2/4/17, CT shows increase in edema suspicious for worsening tumor, 9.3mm R->L midline shift. Last MRI shows a 5 mm of LEFT-to-RIGHT midline shift. Poor prognosis. Family discussion held on 2/5/16 to make patient comfort care but we will continue dexamethasone.  - discharge to home hospice care.     # Aspiration pneumonia ([facility])-   d/c antibiotics, comfort care     # Dyslipidemia  - DC simvastatin, comfort care     # Iron deficiency anemia    # ESRD (end stage renal disease) on dialysis (CMS-[facility])  -DC HD. Comfort care     # Neurogenic bladder- hawkins ok,   - discontinue home bethanechol, doxazosin.  Comfort care     # Diabetes mellitus without complication ([facility])  - discontinue home glargine.  Comfort care     # HTN (hypertension)  -discontinue home metoprolol, doxazosin.  Comfort care       Consultants:     palliative team    Procedures:        none    Imaging/ Testing:      CT head    1.  Significant increase in white matter edema and gray matter thickening in the right frontal and temporal lobes highly suspicious for worsening tumor    2.  9.3 mm of right to left shift producing partial subfalcine herniation with mild dilation of left lateral ventricle    Discharge Medications:         Medication Reconciliation: Completed      Medication List      START taking these medications       Instructions    artificial tears 1.4 % Soln    Place 2 Drops in both eyes every 6 hours as needed (Dry eyes   ).   Dose:  2 Drop       bisacodyl 10 MG Supp   Commonly known as:  DULCOLAX    Insert 1 Suppository in rectum 1 time daily as needed.   Dose:  10 mg       lidocaine viscous 2% 2 % Soln   Commonly known as:  XYLOCAINE    Take 5 mL by mouth every four hours as needed for Throat/Mouth Pain (Comfort).   Dose:  5 mL       * lorazepam 2 MG/ML Soln   Commonly known as:  ATIVAN    0.5-1 mL by Intravenous route every four hours as needed (anxiety/restlessness).    Dose:  1-2 mg       * lorazepam 2 MG/ML Soln   Commonly known as:  ATIVAN    1.5-2 mL by Intravenous route every four hours as needed (anxiety/restlessness).   Dose:  3-4 mg       * lorazepam 2 MG/ML Soln   Commonly known as:  ATIVAN    2.5 mL by Intravenous route every four hours as needed (anxiety/restlessness).   Dose:  5 mg       morphine (pf) 10 mg/ml 10 MG/ML Soln   Last time this was given:  5 mg on 2/6/2017 11:52 AM    0.5-1 mL by Intravenous route every 1 hour as needed.   Dose:  5-10 mg       ondansetron 4 MG Tbdp   Commonly known as:  ZOFRAN ODT    Take 1 Tab by mouth every 8 hours as needed for Nausea/Vomiting ( ).   Dose:  4 mg       * ondansetron 4 MG/2ML Soln injection   Commonly known as:  ZOFRAN    2 mL by Intravenous route every 8 hours as needed for Nausea/Vomiting (if unable to tolerate PO.).   Dose:  4 mg       * ondansetron 4 MG/2ML Soln injection   Commonly known as:  ZOFRAN    4 mL by Intravenous route every 8 hours as needed for Nausea/Vomiting (if unable to tolerate PO).   Dose:  8 mg       oxycodone 20 MG/ML Conc   Last time this was given:  5 mg on 2/5/2017 10:16 AM    Take 0.25 mL by mouth every 1 hour as needed (pain).   Dose:  5 mg       scopolamine 1.5 MG/3DAYS Pt72   Last time this was given:  1 Patch on 2/5/2017 10:48 AM   Commonly known as:  TRANSDERM-SCOP    Apply 1 Patch to skin as directed every 72 hours.   Dose:  1 Patch       * Notice:  This list has 5 medication(s) that are the same as other medications prescribed for you. Read the directions carefully, and ask your doctor or other care provider to review them with you.      CONTINUE taking these medications       Instructions    dexamethasone 1 MG Tabs   Commonly known as:  DECADRON    1 Tab by Nasogastric route 2 Times a Day.   Dose:  1 mg         ASK your doctor about these medications       Instructions    acetaminophen 325 MG Tabs   Commonly known as:  TYLENOL    2 Tabs by Per NG Tube route every four hours as needed  (Mild Pain; (Pain scale 1-3); Temp greater than 100.5 F).   Dose:  650 mg       alendronate 35 MG tablet   Commonly known as:  FOSAMAX    1 Tab by Per NG Tube route every 7 days.   Dose:  35 mg       ascorbic acid 500 MG tablet   Commonly known as:  VITAMIN C    Take 1 Tab by mouth every day.   Dose:  500 mg       bethanechol 25 MG Tabs   Commonly known as:  URECHOLINE    25 mg by Per NG Tube route every day.   Dose:  25 mg       CULTURELLE PO    1 Cap by Per NG Tube route every day. X 37 days   Dose:  1 Cap       doxazosin 1 MG Tabs   Last time this was given:  1 mg on 2/4/2017 11:00 PM   Commonly known as:  CARDURA    1 Tab by Per NG Tube route every bedtime.   Dose:  1 mg       ferrous sulfate 325 (65 FE) MG tablet    1 Tab by Per NG Tube route every morning with breakfast.   Dose:  325 mg       insulin glargine 100 UNIT/ML Soln   Last time this was given:  20 Units on 2/4/2017 10:35 PM   Commonly known as:  LANTUS    Inject 20 Units as instructed every evening.   Dose:  20 Units       insulin lispro 100 UNIT/ML Soln   Last time this was given:  3 Units on 2/5/2017  5:32 AM   Commonly known as:  HUMALOG    Inject 3-14 Units as instructed 4 Times a Day,Before Meals and at Bedtime. Per sliding scale  0-150 = 0 units 151-200 = 3 units 201-250 = 5 units 251-300 = 7 units 301-350 = 10 units 351-400 = 12 units > 400 = 14 units  Before meals and at bedtime   Dose:  3-14 Units       levetiracetam 100 MG/ML Soln   Last time this was given:  500 mg on 2/4/2017 11:00 PM   Commonly known as:  KEPPRA    5 mL by Nasogastric route every 12 hours.   Dose:  500 mg       metoprolol 25 MG Tabs   Last time this was given:  25 mg on 2/4/2017 11:00 PM   Commonly known as:  LOPRESSOR    1 Tab by Per NG Tube route 2 Times a Day.   Dose:  25 mg       moxifloxacin 400 MG Tabs   Commonly known as:  AVELOX    Take 400 mg by mouth every day.   Dose:  400 mg       simvastatin 10 MG Tabs   Last time this was given:  10 mg on 2/4/2017 11:00 PM    Commonly known as:  ZOCOR    1 Tab by Per NG Tube route every evening.   Dose:  10 mg             Disposition:   Home hospice     Diet:   npo    Activity:   As tolerated    36 mins spent on discharge day patient visit, preparing discharge paperwork and arranging for patient follow up.      Discharge Time (Minutes) :    40 Mins.      Condition on Discharge    ______________________________________________________________________    Interval history/exam for day of discharge:    Patient appears more alert and comfortable today. Oriented to self but not place or year. Family at bedside. She will be discharged today with home hospice. POLST form was signed.     Filed Vitals:    02/04/17 2356 02/05/17 0358 02/05/17 0800 02/05/17 1900   BP: 147/57 168/72 174/61 145/72   Pulse: 91 96 100 84   Temp: 36.4 °C (97.6 °F) 36.7 °C (98 °F) 36.4 °C (97.5 °F) 36.6 °C (97.9 °F)   Resp: 18 18 16 16   Height:       Weight:       SpO2: 94% 96% 99% 90%     Weight/BMI: Body mass index is 29.26 kg/(m^2).  Pulse Oximetry: 90 %, O2 (LPM): 0, O2 Delivery: None (Room Air)    General: Appears more comfortable and alert.   PULM: Normal breath sounds b/l.      Most Recent Labs:    Lab Results   Component Value Date/Time    WBC 8.8 02/04/2017 11:54 PM    RBC 2.45* 02/04/2017 11:54 PM    HEMOGLOBIN 7.4* 02/04/2017 11:54 PM    HEMATOCRIT 24.2* 02/04/2017 11:54 PM    MCV 98.8* 02/04/2017 11:54 PM    MCH 30.2 02/04/2017 11:54 PM    MCHC 30.6* 02/04/2017 11:54 PM    MPV 9.8 02/04/2017 11:54 PM    NEUTROPHILS-POLYS 93.70* 02/04/2017 11:54 PM    LYMPHOCYTES 0.90* 02/04/2017 11:54 PM    MONOCYTES 0.90 02/04/2017 11:54 PM    EOSINOPHILS 0.00 02/04/2017 11:54 PM    BASOPHILS 0.90 02/04/2017 11:54 PM    HYPOCHROMIA 1+ 02/04/2017 01:12 PM    ANISOCYTOSIS 1+ 02/04/2017 11:54 PM      Lab Results   Component Value Date/Time    SODIUM 137 02/04/2017 11:54 PM    POTASSIUM 3.7 02/04/2017 11:54 PM    CHLORIDE 101 02/04/2017 11:54 PM    CO2 31 02/04/2017 11:54 PM     GLUCOSE 210* 02/04/2017 11:54 PM    BUN 40* 02/04/2017 11:54 PM    CREATININE 1.98* 02/04/2017 11:54 PM    BUN-CREATININE RATIO 14 04/21/2011 10:40 AM      Lab Results   Component Value Date/Time    ALT(SGPT) 15 02/04/2017 11:54 PM    AST(SGOT) 16 02/04/2017 11:54 PM    ALKALINE PHOSPHATASE 92 02/04/2017 11:54 PM    TOTAL BILIRUBIN 0.3 02/04/2017 11:54 PM    LIPASE 23 01/06/2017 09:58 PM    ALBUMIN 2.2* 02/04/2017 11:54 PM    GLOBULIN 1.9 02/04/2017 11:54 PM    PRE-ALBUMIN 18.0 01/23/2017 03:22 AM    INR 1.06 02/04/2017 01:12 PM    MACROCYTOSIS 2+ 04/23/2016 11:35 AM     Lab Results   Component Value Date/Time    PT 14.1 02/04/2017 01:12 PM    INR 1.06 02/04/2017 01:12 PM

## 2017-02-06 NOTE — DISCHARGE INSTRUCTIONS
Discharge Instructions    Discharged to home by ambulance with escort. Discharged via ambulance, hospital escort: Yes.  Special equipment needed: Not Applicable    Be sure to schedule a follow-up appointment with your primary care doctor or any specialists as instructed.     Discharge Plan:   Diet Plan: Discussed  Activity Level: Discussed  Confirmed Follow up Appointment: No (Comments) (Home with hospice)  Confirmed Symptoms Management: Discussed  Medication Reconciliation Updated: Yes  Influenza Vaccine Indication: Not indicated: Previously immunized this influenza season and > 8 years of age    I understand that a diet low in cholesterol, fat, and sodium is recommended for good health. Unless I have been given specific instructions below for another diet, I accept this instruction as my diet prescription.   Other diet: Eat as tolerated    Special Instructions: None    · Is patient discharged on Warfarin / Coumadin?   No     · Is patient Post Blood Transfusion?  No    Depression / Suicide Risk    As you are discharged from this [facility] facility, it is important to learn how to keep safe from harming yourself.    Recognize the warning signs:  · Abrupt changes in personality, positive or negative- including increase in energy   · Giving away possessions  · Change in eating patterns- significant weight changes-  positive or negative  · Change in sleeping patterns- unable to sleep or sleeping all the time   · Unwillingness or inability to communicate  · Depression  · Unusual sadness, discouragement and loneliness  · Talk of wanting to die  · Neglect of personal appearance   · Rebelliousness- reckless behavior  · Withdrawal from people/activities they love  · Confusion- inability to concentrate     If you or a loved one observes any of these behaviors or has concerns about self-harm, here's what you can do:  · Talk about it- your feelings and reasons for harming yourself  · Remove any means that you might use to  hurt yourself (examples: pills, rope, extension cords, firearm)  · Get professional help from the community (Mental Health, Substance Abuse, psychological counseling)  · Do not be alone:Call your Safe Contact- someone whom you trust who will be there for you.  · Call your local CRISIS HOTLINE 405-8440 or 393-399-6640  · Call your local Children's Mobile Crisis Response Team [place] (132) 887-2010 or www.Markkit  · Call the toll free National Suicide Prevention Hotlines   · National Suicide Prevention Lifeline 874-911-CCWI (7999)  · National Hope Line Network 800-SUICIDE (361-5735)

## 2017-02-06 NOTE — CARE PLAN
Problem: Nutritional:  Goal: Nutrition support tolerated and meeting greater than 85% of estimated needs  Outcome: NOT MET  Pt transitioned to comfort care, TF d/c'd and is now NPO.  RD no longer following but is available prn.

## 2017-02-06 NOTE — PROGRESS NOTES
Rec'd report from day shift RN. Assumed pt care. Assessment completed. AAOx1 (disoriented to event, time, and place). Patient showing s/s of pain. Medicated accordingly (see MAR). No other s/s of discomfort or distress. Hillman draining to gravity. Pt unable to ambulate. Pt incontinent of stool. Bed in lowest position, bed locked, bed alarm is on. Patient does not call appropriately. Treaded socks in place, RN and CNA numbers provided, call light within reach. Pt needs met at this time.

## 2017-02-06 NOTE — DISCHARGE PLANNING
Medical Social Work    SW spoke with AJ with [facility] who informed this SW that all DME has been delivered to pt's home and asked if it is possible to have transportation rescheduled for 12:00pm.  NEHEMIAH spoke with pt and family who are agreeable to an earlier transport, if available with [facility].  NEHEMIAH called and spoke with Arnav at [facility] who informed this SW that they can do 12:30pm.  NEHEMIAH provided, pt and her family, bedside RN and AJ with [facility] with an update.    Plan:  Pt to d/c home with family support and Hospice via [facility] at 12:30pm.

## 2021-01-13 DIAGNOSIS — Z23 NEED FOR VACCINATION: ICD-10-CM

## 2021-11-16 NOTE — IP AVS SNAPSHOT
" After Visit Summary                                                                                                                  Name:Yina Stephens  Medical Record Number:[id number]  CSN: [id number]    YOB: 1945   Age: 71 y.o.  Sex: female  HT:1.575 m (5' 2\") WT: 71.1 kg (156 lb 12 oz)          Admit Date: 1/7/2017     Discharge Date:   Today's Date: 1/27/2017  Attending Doctor:  Kathleen Young M.D.                  Allergies:  Codeine; Dilaudid; Percocet; Percodan; Ultram; and Vicodin            Discharge Instructions       Discharge Instructions    Discharged to other by ambulance with escort. Discharged via ambulance, hospital escort: Yes.  Special equipment needed: Not Applicable    Be sure to schedule a follow-up appointment with your primary care doctor or any specialists as instructed.     Discharge Plan:   Diet Plan: Discussed  Activity Level: Discussed  Confirmed Follow up Appointment: Patient to Call and Schedule Appointment  Confirmed Symptoms Management: Discussed  Medication Reconciliation Updated: Yes  Influenza Vaccine Indication: Not indicated: Previously immunized this influenza season and > 8 years of age    I understand that a diet low in cholesterol, fat, and sodium is recommended for good health. Unless I have been given specific instructions below for another diet, I accept this instruction as my diet prescription.   Other diet: diabetisource @ 50        Special Instructions: None    · Is patient discharged on Warfarin / Coumadin?   No     · Is patient Post Blood Transfusion?  Yes  POST BLOOD TRANSFUSION INFORMATION (ADULT)    The purpose of blood transfusion is to correct anemia, low levels of blood clotting factors or to correct acute blood loss.   Blood transfusion is very safe but occasionally unexpected adverse reactions do occur. Most adverse reactions occur during transfusion, within one to two days following transfusion or one to two weeks following transfusion. Some " Routine referral received from Dr Whitlock for Interstitial Pulmonary Fibrosis.    Referral notes state:  \"Interstitial fibrosis with smoking history\"        CT Chest Lung Screen done 11/9/2021  IMPRESSION:  Nodules: Small lung nodules are present, none concerning.  Category: 2 - Benign.  Follow-up: Continued annual screening LOW-DOSE chest CT.  Imaging Options: CT Lung Cancer Screening Low Dose WO Contrast [[id number]]  Incidental Findings/Recommendations  There is diffuse reticular interstitial markings throughout the lungs  bilaterally with superimposed groundglass airspace opacities. Findings  could indicate early manifestations of pulmonary interstitial fibrosis.  Consider pulmonary consultation.    Per epic, pt a current every day smoker.    OK to work referral following ILD protocol?   adverse reactions can occur one to six months after transfusion and some even years later.             If any of the symptoms listed below happen to you during your transfusion,                                 please notify your nurse immediately.   · Fever or Chills  · Flushing of the Face  · Hives, rash or itching  · Difficulty in breathing or shortness of breath  · Pain or oozing of blood from the IV needle site  · Low back pain  · Nausea or vomiting  · Weakness or fainting  · Chest pain  · Blood in the urine  · Decreased frequency of urination    The above symptoms may also occur within 24 to 48 after transfusion; if so, notify your physician.     · Yellowing of the skin    This can happen one to six months after transfusion; if so, notify your physician    Depression / Suicide Risk    As you are discharged from this [facility] facility, it is important to learn how to keep safe from harming yourself.    Recognize the warning signs:  · Abrupt changes in personality, positive or negative- including increase in energy   · Giving away possessions  · Change in eating patterns- significant weight changes-  positive or negative  · Change in sleeping patterns- unable to sleep or sleeping all the time   · Unwillingness or inability to communicate  · Depression  · Unusual sadness, discouragement and loneliness  · Talk of wanting to die  · Neglect of personal appearance   · Rebelliousness- reckless behavior  · Withdrawal from people/activities they love  · Confusion- inability to concentrate     If you or a loved one observes any of these behaviors or has concerns about self-harm, here's what you can do:  · Talk about it- your feelings and reasons for harming yourself  · Remove any means that you might use to hurt yourself (examples: pills, rope, extension cords, firearm)  · Get professional help from the community (Mental Health, Substance Abuse, psychological counseling)  · Do not be alone:Call your Safe Contact-  someone whom you trust who will be there for you.  · Call your local CRISIS HOTLINE 298-7808 or 098-622-9855  · Call your local Children's Mobile Crisis Response Team [place] (465) 142-4572 or www.[employer]  · Call the toll free National Suicide Prevention Hotlines   · National Suicide Prevention Lifeline 121-049-EOQX (3500)  · National Hope Line Network 800-SUICIDE (742-8838)        Your appointments     Jan 28, 2017  8:10 AM   Adult Draw/Collection with LAB SKILLED NURSING   LAB - SKILLED NURSING (--)    [address] [facility]  [place] [address] [phone]            Feb 06, 2017  1:00 PM   Radiation New Patient with Devin Martinez M.D.   [facility] Radiation Therapy (--)    [address] [facility]  [place] [address] [phone]              Follow-up Information     1. Follow up with Omer Park M.D.. Schedule an appointment as soon as possible for a visit in 2 weeks.    Specialty:  Family Medicine    Why:  Hospital follow-up appointment with PCP    Contact information    [address] [facility]  [place] [address] [phone]          2. Follow up with [facility]UR[facility] .    Specialties:  Skilled Nursing Facility, Hospice, Rehabilitation    Contact information    [address] [facility] [phone]           Discharge Medication Instructions:    Below are the medications your physician expects you to take upon discharge:    Review all your home medications and newly ordered medications with your doctor and/or pharmacist. Follow medication instructions as directed by your doctor and/or pharmacist.    Please keep your medication list with you and share with your physician.               Medication List      START taking these medications        Instructions    acetaminophen 325 MG Tabs   Last time this was given:  650 mg on 1/25/2017  5:04 PM   Commonly known as:  TYLENOL    2 Tabs by Per NG Tube route every four hours as needed (Mild Pain; (Pain scale 1-3); Temp greater than 100.5 F).   Dose:  650  mg       alprazolam 0.25 MG Tabs   Last time this was given:  0.25 mg on 1/23/2017 10:03 PM   Commonly known as:  XANAX    1 Tab by Per NG Tube route at bedtime as needed for Anxiety.   Dose:  0.25 mg       ascorbic acid 500 MG tablet   Last time this was given:  500 mg on 1/27/2017  1:01 PM   Commonly known as:  VITAMIN C    Take 1 Tab by mouth every day.   Dose:  500 mg       dexamethasone 1 MG Tabs   Last time this was given:  1 mg on 1/26/2017  9:45 PM   Commonly known as:  DECADRON    1 Tab by Nasogastric route 2 Times a Day.   Dose:  1 mg       doxazosin 1 MG Tabs   Last time this was given:  1 mg on 1/26/2017  9:45 PM   Commonly known as:  CARDURA    1 Tab by Per NG Tube route every bedtime.   Dose:  1 mg       ferrous sulfate 325 (65 FE) MG tablet   Last time this was given:  325 mg on 1/27/2017  1:01 PM    1 Tab by Per NG Tube route every morning with breakfast.   Dose:  325 mg       heparin 1000 units/mL 1000 units/mL Soln   Last time this was given:  1/27/2017 11:44 AM    3.7 mL by Intracatheter route as needed (CVC LOCK POST HD 1.8ML-RED PORT; 1.9ML-BLUE PORT).   Dose:  3700 Units       insulin glargine 100 UNIT/ML Soln   Last time this was given:  20 Units on 1/25/2017 10:04 PM   Commonly known as:  LANTUS    Inject 20 Units as instructed every evening.   Dose:  20 Units       insulin lispro 100 UNIT/ML Soln   Last time this was given:  3 Units on 1/27/2017  5:14 AM   Commonly known as:  HUMALOG    Inject 3-14 Units as instructed every 6 hours.   Dose:  3-14 Units       levetiracetam 100 MG/ML Soln   Last time this was given:  500 mg on 1/27/2017  1:03 PM   Commonly known as:  KEPPRA    5 mL by Nasogastric route every 12 hours.   Dose:  500 mg       metoprolol 25 MG Tabs   Last time this was given:  25 mg on 1/27/2017  1:01 PM   Commonly known as:  LOPRESSOR    1 Tab by Per NG Tube route 2 Times a Day.   Dose:  25 mg       miconazole 2%-zinc oxide 2 % Crea topical cream   Last time this was given:   1/27/2017  1:01 PM    Apply 1 Application to affected area(s) 2 Times a Day.   Dose:  1 Application       morphine 15 MG tablet   Last time this was given:  15 mg on 1/25/2017  8:45 PM   Commonly known as:  MS IR    1 Tab by Per NG Tube route every 6 hours as needed.   Dose:  15 mg       ondansetron 4 MG Tbdp   Last time this was given:  4 mg on 1/25/2017  8:44 PM   Commonly known as:  ZOFRAN ODT    1 Tab by Per NG Tube route every four hours as needed for Nausea/Vomiting (give PO if IV route is unavailable. May give per feeding tube.).   Dose:  4 mg       promethazine 25 MG Tabs   Last time this was given:  25 mg on 1/18/2017  3:42 PM   Commonly known as:  PHENERGAN    1 Tab by Per NG Tube route every 6 hours as needed for Nausea/Vomiting.   Dose:  25 mg       scopolamine 1.5 MG/3DAYS Pt72   Last time this was given:  1 Patch on 1/25/2017  1:47 PM   Commonly known as:  TRANSDERM-SCOP    Apply 1 Patch to skin as directed every 72 hours.   Dose:  1 Patch         CHANGE how you take these medications        Instructions    alendronate 35 MG tablet   What changed:  how to take this   Commonly known as:  FOSAMAX    1 Tab by Per NG Tube route every 7 days.   Dose:  35 mg       simvastatin 10 MG Tabs   What changed:    - medication strength  - how much to take  - how to take this   Last time this was given:  10 mg on 1/26/2017  9:45 PM   Commonly known as:  ZOCOR    1 Tab by Per NG Tube route every evening.   Dose:  10 mg               Instructions           Diet / Nutrition:    Follow any diet instructions given to you by your doctor or the dietician, including how much salt (sodium) you are allowed each day.    If you are overweight, talk to your doctor about a weight reduction plan.    Activity:    Remain physically active following your doctor's instructions about exercise and activity.    Rest often.     Any time you become even a little tired or short of breath, SIT DOWN and rest.    Worsening Symptoms:    Report  any of the following signs and symptoms to the doctor's office immediately:    *Pain of jaw, arm, or neck  *Chest pain not relieved by medication                               *Dizziness or loss of consciousness  *Difficulty breathing even when at rest   *More tired than usual                                       *Bleeding drainage or swelling of surgical site  *Swelling of feet, ankles, legs or stomach                 *Fever (>100ºF)  *Pink or blood tinged sputum  *Weight gain (3lbs/day or 5lbs /week)           *Shock from internal defibrillator (if applicable)  *Palpitations or irregular heartbeats                *Cool and/or numb extremities    Stroke Awareness    Common Risk Factors for Stroke include:    Age  Atrial Fibrillation  Carotid Artery Stenosis  Diabetes Mellitus  Excessive alcohol consumption  High blood pressure  Overweight   Physical inactivity  Smoking    Warning signs and symptoms of a stroke include:    *Sudden numbness or weakness of the face, arm or leg (especially on one side of the body).  *Sudden confusion, trouble speaking or understanding.  *Sudden trouble seeing in one or both eyes.  *Sudden trouble walking, dizziness, loss of balance or coordination.Sudden severe headache with no known cause.    It is very important to get treatment quickly when a stroke occurs. If you experience any of the above warning signs, call 911 immediately.                   Disclaimer         Quit Smoking / Tobacco Use:    I understand the use of any tobacco products increases my chance of suffering from future heart disease or stroke and could cause other illnesses which may shorten my life. Quitting the use of tobacco products is the single most important thing I can do to improve my health. For further information on smoking / tobacco cessation call a Toll Free Quit Line at 1-615.520.4618 (*National Cancer [place]) or 1-440.192.8977 (American Lung Association) or you can access the web based program at  www.lungusa.org.    [place] Tobacco Users Help Line:  (297) 321-5015       Toll Free: 1-102.742.2454  Quit Tobacco Program [facility] (928)107-6154    Crisis Hotline:    [place] Crisis Hotline:  1-935-TBIVLRH or 1-874.850.3156    [place] Crisis Hotline:    1-611.305.8593 or 981-956-1470    Discharge Survey:   Thank you for choosing [facility]. We hope we did everything we could to make your hospital stay a pleasant one. You may be receiving a phone survey and we would appreciate your time and participation in answering the questions. Your input is very valuable to us in our efforts to improve our service to our patients and their families.        My signature on this form indicates that:    1. I have reviewed and understand the above information.  2. My questions regarding this information have been answered to my satisfaction.  3. I have formulated a plan with my discharge nurse to obtain my prescribed medications for home.                  Disclaimer         __________________________________                     __________       ________                       Patient Signature                                                 Date                    Time

## 2022-06-22 NOTE — CARE PLAN
Problem: Neuro Status  Goal: Monitor neuro status and rapid identification of neuro changes  Intervention: If decrease in Neuro status, notify MD for testing/treatment orders  Stat head CT scan was completed early this afternoon following neuro changes. Dr Pelayo discussed CT results with family on phone.       Problem: Nutrition Deficit  Goal: Adequate Food and Fluid Intake  Intervention: Collaborate with Clinical Dietician  TF started per protocol and per dietary recommendation. Pt continues to have intermittent nausea and dry heaving. Pt medicated with PRN medications with some effect.            no

## 2022-12-29 NOTE — PROGRESS NOTES
Oncology/Hematology Progress Note               Author: Miki Silverman Date & Time created: 1/24/2017  11:15 AM     Interval History:  CC: Glioblastoma          Thrombocytopenia          Renal failure  More alert and awake  Can move left side better.Not ambulating  Dialysis Mon-Wed- Friday  Plts 56k; stopped heparin; tapering Decadron    Review of Systems:  Review of Systems   Constitutional: Negative for fever and chills.   Respiratory: Negative.    Cardiovascular: Negative.    Gastrointestinal: Negative for abdominal pain.   Genitourinary: Negative.    Musculoskeletal: Negative.    Neurological: Positive for focal weakness. Negative for headaches.   Psychiatric/Behavioral: Positive for memory loss.       Physical Exam:  Physical Exam   Constitutional: No distress.   HENT:   Head: Normocephalic.   Mouth/Throat: No oropharyngeal exudate.   Tube feeding   Neck: Normal range of motion.   Dialysis cath   Cardiovascular: Normal rate.    Pulmonary/Chest: Effort normal and breath sounds normal.   Abdominal: Soft.   Lymphadenopathy:     She has no cervical adenopathy.   Neurological:   Confused  Can move left side better   Skin: Skin is warm.       Labs:        Invalid input(s): [id number]      Recent Labs      01/22/17   1700  01/23/17   1306  01/24/17   0303   SODIUM  135  135  134*   POTASSIUM  4.2  4.0  4.5   CHLORIDE  102  103  101   CO2  28  29  29   BUN  75*  41*  58*   CREATININE  3.54*  1.96*  2.49*   CALCIUM  7.3*  7.1*  7.3*     Recent Labs      01/22/17   1700  01/23/17   0322  01/23/17   1306  01/24/17   0303   PREALBUMIN   --   18.0   --    --    GLUCOSE  167*   --   160*  152*     Recent Labs      01/22/17   0011  01/22/17   0549  01/23/17   0322  01/24/17   0303   RBC  3.37*   --   2.92*  2.90*   HEMOGLOBIN  10.2*   --   8.8*  8.7*   HEMATOCRIT  30.9*   --   26.7*  26.8*   PLATELETCT  71*   --   57*  58*   IRON   --   27*  26*  32*   TOTIRONBC   --   153*  146*  161*     Recent Labs      01/22/17    0011  17   0322  17   0303   WBC  22.9*  18.1*  14.1*     Recent Labs      17   1700  17   1306  17   0303   SODIUM  135  135  134*   POTASSIUM  4.2  4.0  4.5   CHLORIDE  102  103  101   CO2  28  29  29   GLUCOSE  167*  160*  152*   BUN  75*  41*  58*   CREATININE  3.54*  1.96*  2.49*   CALCIUM  7.3*  7.1*  7.3*     Hemodynamics:  Temp (24hrs), Av.6 °C (97.8 °F), Min:36.2 °C (97.1 °F), Max:36.9 °C (98.5 °F)  Temperature: 36.9 °C (98.5 °F)  Pulse  Av.1  Min: 52  Max: 120   Blood Pressure : 137/64 mmHg     Respiratory:    Respiration: 18, Pulse Oximetry: 94 %     Work Of Breathing / Effort: Shallow  RUL Breath Sounds: Diminished, RML Breath Sounds: Diminished, RLL Breath Sounds: Diminished, SOURAV Breath Sounds: Diminished, LLL Breath Sounds: Diminished  Fluids:    Intake/Output Summary (Last 24 hours) at 17 0827  Last data filed at 17 0500   Gross per 24 hour   Intake   2880 ml   Output    300 ml   Net   2580 ml        GI/Nutrition:  Orders Placed This Encounter   Procedures   • DIET ORDER     Standing Status: Standing      Number of Occurrences: 1      Standing Expiration Date:      Order Specific Question:  Diet:     Answer:  Full Liquid [11]      Comments:  NO MEAL TRAYS, ONLY SNACKS     Order Specific Question:  Consistency/Fluid modifications:     Answer:  Nectar Thick [2]     Medical Decision Making, by Problem:  Active Hospital Problems    Diagnosis   • Glioblastoma (CMS-HCC) [C71.9]   • Type 2 diabetes mellitus with stage 3 chronic kidney disease (CMS-HCC) [E11.22, N18.3]   • HTN (hypertension) [I10]   • Hyponatremia [E87.1]   • Protein-calorie malnutrition, severe (CMS-HCC) [E43]   • Thrombocytopenia (CMS-HCC) [D69.6]   • Renal failure, acute on chronic (CMS-HCC) [N17.9, N18.9]   • Dyslipidemia [E78.5]       Plan:  Thrombocytopenia- HIT negative. Check fibrinogen ,possibly related to consumption/stress .Monitor. Currently off heparin.  Anemia- from CKD, recent  surgery , check retic, LDH.Transfuse if symptomatic. May need Procrit with dialysis.  GBM- To start chemo XRT in early Feb .She may be in ECF/Rehab; make sure [facility] knows where patient is for early Feb XRT. Pt should see  in 5-7  days to arrange outpt [facility].       Labs reviewed                       Metronidazole Counseling:  I discussed with the patient the risks of metronidazole including but not limited to seizures, nausea/vomiting, a metallic taste in the mouth, nausea/vomiting and severe allergy.
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
